# Patient Record
Sex: FEMALE | Race: WHITE | Employment: OTHER | URBAN - METROPOLITAN AREA
[De-identification: names, ages, dates, MRNs, and addresses within clinical notes are randomized per-mention and may not be internally consistent; named-entity substitution may affect disease eponyms.]

---

## 2017-01-03 ENCOUNTER — TELEPHONE (OUTPATIENT)
Dept: INTERNAL MEDICINE CLINIC | Age: 82
End: 2017-01-03

## 2017-01-03 NOTE — TELEPHONE ENCOUNTER
Contacted patient and informed her blood counts have dropped since previous labs and repeat labs will be done with next office visit per Adam Celeste NP. Patient expressed understanding.

## 2017-01-05 ENCOUNTER — TELEPHONE (OUTPATIENT)
Dept: INTERNAL MEDICINE CLINIC | Age: 82
End: 2017-01-05

## 2017-01-05 NOTE — TELEPHONE ENCOUNTER
I have attempted to contact this patient by phone with the following results: left message to return my call on answering machine.

## 2017-01-05 NOTE — TELEPHONE ENCOUNTER
----- Message from Ial Keyes NP sent at 1/5/2017 12:04 PM EST -----  Please notify patient that lab results were reviewed and the results are normal. She has plenty of B12 on board

## 2017-01-10 NOTE — TELEPHONE ENCOUNTER
Contacted Esperanza Locke and informed Her of lab results per Luis Alfredo Pringle NP's request. Maia Davis expressed understanding.  She stated she is continuing with her visit to see Dr Delta Maldonado

## 2017-02-06 ENCOUNTER — HOSPITAL ENCOUNTER (OUTPATIENT)
Dept: LAB | Age: 82
Discharge: HOME | End: 2017-02-06
Payer: MEDICARE

## 2017-02-06 DIAGNOSIS — R19.5 ABNORMAL FECES: ICD-10-CM

## 2017-02-06 LAB
BASOPHILS # BLD AUTO: 0.1 K/UL (ref 0–0.1)
BASOPHILS # BLD: 1 % (ref 0–2)
DIFFERENTIAL METHOD BLD: ABNORMAL
EOSINOPHIL # BLD: 0.3 K/UL (ref 0–0.4)
EOSINOPHIL NFR BLD: 5 % (ref 0–5)
ERYTHROCYTE [DISTWIDTH] IN BLOOD BY AUTOMATED COUNT: 13.9 % (ref 11.6–14.5)
FERRITIN SERPL-MCNC: 30 NG/ML (ref 8–388)
HCT VFR BLD AUTO: 33.2 % (ref 35–45)
HGB BLD-MCNC: 10.4 G/DL (ref 12–16)
IRON SATN MFR SERPL: 18 %
IRON SERPL-MCNC: 61 UG/DL (ref 50–175)
LYMPHOCYTES # BLD AUTO: 35 % (ref 21–52)
LYMPHOCYTES # BLD: 2.3 K/UL (ref 0.9–3.6)
MCH RBC QN AUTO: 30.1 PG (ref 24–34)
MCHC RBC AUTO-ENTMCNC: 31.3 G/DL (ref 31–37)
MCV RBC AUTO: 96.2 FL (ref 74–97)
MONOCYTES # BLD: 0.6 K/UL (ref 0.05–1.2)
MONOCYTES NFR BLD AUTO: 9 % (ref 3–10)
NEUTS SEG # BLD: 3.4 K/UL (ref 1.8–8)
NEUTS SEG NFR BLD AUTO: 50 % (ref 40–73)
PLATELET # BLD AUTO: 176 K/UL (ref 135–420)
PMV BLD AUTO: 11.6 FL (ref 9.2–11.8)
RBC # BLD AUTO: 3.45 M/UL (ref 4.2–5.3)
TIBC SERPL-MCNC: 348 UG/DL (ref 250–450)
WBC # BLD AUTO: 6.6 K/UL (ref 4.6–13.2)

## 2017-02-06 PROCEDURE — 85025 COMPLETE CBC W/AUTO DIFF WBC: CPT | Performed by: INTERNAL MEDICINE

## 2017-02-06 PROCEDURE — 36415 COLL VENOUS BLD VENIPUNCTURE: CPT | Performed by: INTERNAL MEDICINE

## 2017-02-06 PROCEDURE — 82728 ASSAY OF FERRITIN: CPT | Performed by: INTERNAL MEDICINE

## 2017-02-06 PROCEDURE — 83540 ASSAY OF IRON: CPT | Performed by: INTERNAL MEDICINE

## 2017-02-07 ENCOUNTER — HOSPITAL ENCOUNTER (OUTPATIENT)
Dept: LAB | Age: 82
Discharge: HOME | End: 2017-02-07
Payer: MEDICARE

## 2017-02-07 DIAGNOSIS — R19.5 ABNORMAL FECES: ICD-10-CM

## 2017-02-07 LAB — HEMOCCULT STL QL: NEGATIVE

## 2017-02-07 PROCEDURE — 82272 OCCULT BLD FECES 1-3 TESTS: CPT | Performed by: INTERNAL MEDICINE

## 2017-02-07 RX ORDER — LOSARTAN POTASSIUM 50 MG/1
TABLET ORAL
Qty: 180 TAB | Refills: 3 | Status: SHIPPED | OUTPATIENT
Start: 2017-02-07 | End: 2018-03-28 | Stop reason: SDUPTHER

## 2017-03-01 DIAGNOSIS — N32.81 OVERACTIVE BLADDER: ICD-10-CM

## 2017-03-01 RX ORDER — TOLTERODINE 4 MG/1
CAPSULE, EXTENDED RELEASE ORAL
Qty: 30 CAP | Refills: 1 | Status: SHIPPED | OUTPATIENT
Start: 2017-03-01 | End: 2017-03-29 | Stop reason: SDUPTHER

## 2017-03-01 NOTE — TELEPHONE ENCOUNTER
Requested Prescriptions     Pending Prescriptions Disp Refills    tolterodine ER (DETROL LA) 4 mg ER capsule 30 Cap 1     Si tablet daily

## 2017-03-29 DIAGNOSIS — N32.81 OVERACTIVE BLADDER: ICD-10-CM

## 2017-03-29 RX ORDER — TOLTERODINE 4 MG/1
CAPSULE, EXTENDED RELEASE ORAL
Qty: 30 CAP | Refills: 1 | Status: SHIPPED | OUTPATIENT
Start: 2017-03-29 | End: 2017-11-14 | Stop reason: SDUPTHER

## 2017-04-05 RX ORDER — METOPROLOL SUCCINATE 50 MG/1
TABLET, EXTENDED RELEASE ORAL
Qty: 90 TAB | Refills: 2 | Status: SHIPPED | OUTPATIENT
Start: 2017-04-05 | End: 2018-01-15 | Stop reason: SDUPTHER

## 2017-05-11 RX ORDER — ATORVASTATIN CALCIUM 40 MG/1
TABLET, FILM COATED ORAL
Qty: 90 TAB | Refills: 2 | Status: SHIPPED | OUTPATIENT
Start: 2017-05-11 | End: 2018-02-28 | Stop reason: SDUPTHER

## 2017-05-15 ENCOUNTER — OFFICE VISIT (OUTPATIENT)
Dept: CARDIOLOGY CLINIC | Age: 82
End: 2017-05-15

## 2017-05-15 VITALS
OXYGEN SATURATION: 94 % | HEART RATE: 55 BPM | HEIGHT: 60 IN | SYSTOLIC BLOOD PRESSURE: 170 MMHG | DIASTOLIC BLOOD PRESSURE: 82 MMHG | WEIGHT: 119 LBS | BODY MASS INDEX: 23.36 KG/M2

## 2017-05-15 DIAGNOSIS — I10 ESSENTIAL HYPERTENSION, BENIGN: ICD-10-CM

## 2017-05-15 DIAGNOSIS — R09.89 LEFT CAROTID BRUIT: ICD-10-CM

## 2017-05-15 DIAGNOSIS — R42 DIZZINESS: ICD-10-CM

## 2017-05-15 DIAGNOSIS — I25.10 ATHEROSCLEROSIS OF NATIVE CORONARY ARTERY OF NATIVE HEART WITHOUT ANGINA PECTORIS: Primary | ICD-10-CM

## 2017-05-15 DIAGNOSIS — H34.9 RETINAL EMBOLI, RIGHT: ICD-10-CM

## 2017-05-15 DIAGNOSIS — R53.83 FATIGUE, UNSPECIFIED TYPE: ICD-10-CM

## 2017-05-15 NOTE — MR AVS SNAPSHOT
Visit Information Date & Time Provider Department Dept. Phone Encounter #  
 5/15/2017  2:00 PM Sergio Ortiz MD [facility] [id number] Upcoming Health Maintenance Date Due DTaP/Tdap/Td series (1 - Tdap) 12/22/1944 ZOSTER VACCINE AGE 60> 12/22/1983 GLAUCOMA SCREENING Q2Y 12/22/1988 OSTEOPOROSIS SCREENING (DEXA) 12/22/1988 MEDICARE YEARLY EXAM 3/19/2017 INFLUENZA AGE 9 TO ADULT 8/1/2017 Allergies as of 5/15/2017  Review Complete On: 5/15/2017 By: Sergio Ortiz MD  
  
 Severity Noted Reaction Type Reactions Viibryd [Vilazodone] High   Anaphylaxis, Vertigo Coreg [Carvedilol]  10/12/2011    Other (comments) Pt states coreg makes her feel like a \"zombie\" Erythromycin  02/17/2011    Nausea Only Severe. Penicillins  02/17/2011    Hives Current Immunizations  Reviewed on 10/18/2016 Name Date Influenza High Dose Vaccine PF 10/18/2016 Influenza Vaccine (Quad) PF 10/22/2015 Pneumococcal Polysaccharide (PPSV-23) 10/5/2010 Pneumococcal Vaccine (Unspecified Type) 10/17/1996 Not reviewed this visit You Were Diagnosed With   
  
 Codes Comments Atherosclerosis of native coronary artery of native heart without angina pectoris    -  Primary ICD-10-CM: I25.10 ICD-9-CM: 414.01 Essential hypertension, benign     ICD-10-CM: I10 
ICD-9-CM: 401.1 Dizziness     ICD-10-CM: V91 ICD-9-CM: 780.4 Left carotid bruit     ICD-10-CM: R09.89 ICD-9-CM: 041. 9 Retinal emboli, right     ICD-10-CM: H34.9 ICD-9-CM: 362.30 Vitals BP Pulse Height(growth percentile) Weight(growth percentile) SpO2 BMI  
 170/82 (!) 55 5' (1.524 m) 119 lb (54 kg) 94% 23.24 kg/m2 OB Status Smoking Status Postmenopausal Never Smoker Vitals History BMI and BSA Data Body Mass Index Body Surface Area  
 23.24 kg/m 2 1.51 m 2 Preferred Pharmacy Pharmacy Name Phone [facility] [address], [address] [address] [phone] Your Updated Medication List  
  
   
This list is accurate as of: 5/15/17  3:08 PM.  Always use your most recent med list.  
  
  
  
  
 aspirin 81 mg tablet Take 81 mg by mouth daily. atorvastatin 40 mg tablet Commonly known as:  LIPITOR  
TAKE ONE TABLET BY MOUTH DAILY  
  
 bromfenac 0.09 % ophthalmic solution Commonly known as:  BROMDAY  
  
 calcium carbonate-vitamin d2 1,200-400 mg-unit Cap Take 1 Cap by mouth daily. clopidogrel 75 mg Tab Commonly known as:  PLAVIX TAKE ONE TABLET BY MOUTH DAILY  
  
 COQ10  100-100 mg-unit Cap Generic drug:  coenzyme q10-vitamin e Take  by mouth.  
  
 escitalopram oxalate 5 mg tablet Commonly known as:  Laci Maria Eugenia TAKE ONE-HALF TO ONE TABLET BY MOUTH DAILY losartan 50 mg tablet Commonly known as:  COZAAR  
TAKE ONE TABLET BY MOUTH TWICE A DAY  
  
 meclizine 12.5 mg tablet Commonly known as:  ANTIVERT Take 1 Tab by mouth two (2) times daily as needed. metoprolol succinate 50 mg XL tablet Commonly known as:  TOPROL-XL  
TAKE ONE TABLET BY MOUTH DAILY  
  
 omeprazole 40 mg capsule Commonly known as:  PRILOSEC  
1 capsule each AM  
  
 * tolterodine ER 4 mg ER capsule Commonly known as:  DETROL LA  
TAKE ONE CAPSULE BY MOUTH DAILY * tolterodine ER 4 mg ER capsule Commonly known as:  DETROL LA  
1 tablet daily VITAMIN B-12 1,000 mcg tablet Generic drug:  cyanocobalamin Take 1,000 mcg by mouth daily. VITAMIN D3 1,000 unit tablet Generic drug:  cholecalciferol Take 1,000 Units by mouth daily. * Notice: This list has 2 medication(s) that are the same as other medications prescribed for you. Read the directions carefully, and ask your doctor or other care provider to review them with you. We Performed the Following AMB POC EKG ROUTINE W/ 12 LEADS, INTER & REP [76133 CPT(R)] Introducing [facility] [place] [facility]! Jamilah Meyer introduces [employer] patient portal. Now you can access parts of your medical record, email your doctor's office, and request medication refills online. 1. In your internet browser, go to [email or web address] 2. Click on the First Time User? Click Here link in the Sign In box. You will see the New Member Sign Up page. 3. Enter your [employer] Access Code exactly as it appears below. You will not need to use this code after youve completed the sign-up process. If you do not sign up before the expiration date, you must request a new code. · [employer] Access Code: [email or web address] Expires: 8/13/2017  3:08 PM 
 
4. Enter the last four digits of your Social Security Number (xxxx) and Date of Birth (mm/dd/yyyy) as indicated and click Submit. You will be taken to the next sign-up page. 5. Create a [employer] ID. This will be your [employer] login ID and cannot be changed, so think of one that is secure and easy to remember. 6. Create a [employer] password. You can change your password at any time. 7. Enter your Password Reset Question and Answer. This can be used at a later time if you forget your password. 8. Enter your e-mail address. You will receive e-mail notification when new information is available in [address] 9. Click Sign Up. You can now view and download portions of your medical record. 10. Click the Download Summary menu link to download a portable copy of your medical information. If you have questions, please visit the Frequently Asked Questions section of the [employer] website. Remember, [employer] is NOT to be used for urgent needs. For medical emergencies, dial 911. Now available from your iPhone and Android! Please provide this summary of care documentation to your next provider. Your primary care clinician is listed as Gissell Alexander. If you have any questions after today's visit, please call [phone].

## 2017-05-15 NOTE — PROGRESS NOTES
1. Have you been to the ER, urgent care clinic since your last visit? Hospitalized since your last visit? no  2. Have you seen or consulted any other health care providers outside of the [facility] since your last visit? Include any pap smears or colon screening.   no

## 2017-05-15 NOTE — PROGRESS NOTES
HISTORY OF PRESENT ILLNESS  Darrall Sandhoff is a 80 y.o. female. HPI  She is complaining of fatigue and weakness. She states that she feels as if she has no energy. She denies chest pain, dyspnea, orthopnea or PND. She has had no palpitations, dizziness or syncope. She does have issues with poor balance. She denies any symptoms to indicate TIA or amaurosis fugax. She has moderately severe anemia with H & H at 10.4 and 33.2 on 02/06/2017. She has a history of mitral valve prolapse and a single isolated episode of loss of central vision of her right eye which was thought to be due to peripheral embolization from the mitral valve prolapse. She was placed on Inderal LA, which was not tolerated well. This was for migraines. Unexpectedly she had acute anterior ST-elevation myocardial infarction on February 22, 2010, and underwent primary intubation. Her cardiac catheterization demonstrated:    1. A 30% to 40% long stenosis of the dominant RCA in the mid segment. 2. Patent left main trunk. 3. A 20% to 30% stenosis of the circumflex coronary artery diffusely. 4. Total occlusion of the LAD in the proximal segment, with faint left-to-left collaterals. 5. Moderate anteroapical hypokinesis of the left ventricle, with EF in the 45% range. The LAD was subsequently intervened upon and stented with a 2.75-mm Cypher stent successfully. She had repeat echocardiogram on May 11, 2010 which demonstrated improved LV function with EF now up to 60%.    She had ambulatory blood pressure recording on 10/25/2011, which demonstrated mild hypertension with mean ambulatory blood pressure recordings of 148/64 on the current medical regimen. Review of Systems   Constitutional: Positive for malaise/fatigue. Negative for weight loss. HENT: Negative for hearing loss. Eyes: Negative for blurred vision and double vision. Respiratory: Negative for shortness of breath.     Cardiovascular: Negative for chest pain, palpitations, orthopnea, claudication, leg swelling and PND. Gastrointestinal: Negative for blood in stool, heartburn and melena. Genitourinary: Negative for dysuria, frequency, hematuria and urgency. Musculoskeletal: Positive for falls. Negative for back pain and joint pain. Skin: Negative for itching and rash. Neurological: Negative for dizziness, loss of consciousness, weakness and headaches. Psychiatric/Behavioral: Negative for depression and memory loss. Physical Exam   Constitutional: She is oriented to person, place, and time. She appears well-developed and well-nourished. HENT:   Head: Normocephalic and atraumatic. Eyes: Conjunctivae are normal. Pupils are equal, round, and reactive to light. Neck: Normal range of motion. Neck supple. No JVD present. Cardiovascular: Normal rate, regular rhythm, S1 normal and S2 normal.   No extrasystoles are present. PMI is not displaced. Exam reveals no gallop and no friction rub. No murmur heard. Pulses:       Carotid pulses are 3+ on the right side with bruit, and 3+ on the left side with bruit. Pulmonary/Chest: Effort normal. She has no rales. Abdominal: Soft. There is no tenderness. Musculoskeletal: She exhibits no edema. Neurological: She is alert and oriented to person, place, and time. No cranial nerve deficit. Skin: Skin is warm and dry. Psychiatric: She has a normal mood and affect. Her behavior is normal.     Visit Vitals    /82    Pulse (!) 55    Ht 5' (1.524 m)    Wt 54 kg (119 lb)    SpO2 94%    BMI 23.24 kg/m2       Past Medical History:   Diagnosis Date    Cardiac ambulatory blood pressure monitoring 10/25/2011    JNC-7 classification:  Stage 1 hypertension.  Cardiac cath 02/22/2010    mRCA 35%. LM patent. Cx 25%. pLAD 100% (2.75 x 23 mm Cypher stent) LVEDP  20.  EF 45%. Anteroapical mod HK    Cardiac echocardiogram 05/11/2010    EF 60%. Gr 2 DDfx. LAE. Mild MR.  Mild-mod PI; PASP 30 mmHg.     Carotid duplex 08/26/2013    Mild 1-49% bilateral ICA stenosis. Biphasic signals in both subclavian arteries.  Coronary artery disease     acute anterior ST-elevation myocardial infarction/primary stenting of the LAD on 02/22/10/EF 45%.  Coronary atherosclerosis of unspecified type of bypass graft     Depressive disorder     Fatigue     Headache     Heart disease, unspecified     Iron deficiency anemia, unspecified     Migraine     Migraine headache     Myocardial infarction ([address] 02/22/2010    Acute anterior wall w/primary intubation, stenting of prox LAD w/ 2.75-mm Cypher stent. 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  Senile cataract, unspecified     Valvular heart disease     Mitral valve prolapse. Social History     Social History    Marital status:      Spouse name: N/A    Number of children: N/A    Years of education: N/A     Occupational History    Not on file. Social History Main Topics    Smoking status: Never Smoker    Smokeless tobacco: Never Used    Alcohol use No    Drug use: No    Sexual activity: Not on file     Other Topics Concern    Not on file     Social History Narrative       Family History   Problem Relation Age of Onset    Heart Disease Mother     Heart Disease Father        Past Surgical History:   Procedure Laterality Date    HX APPENDECTOMY      HX BREAST LUMPECTOMY      Left.  HX CATARACT REMOVAL  5/1/2013, 5/8/2013    left and right    HX CORONARY STENT PLACEMENT  2/22/10    prox LAD w/ 2.75-mm Cypher stent.     HX HEART CATHETERIZATION      HX TONSILLECTOMY         Current Outpatient Prescriptions   Medication Sig Dispense Refill    atorvastatin (LIPITOR) 40 mg tablet TAKE ONE TABLET BY MOUTH DAILY 90 Tab 2    metoprolol succinate (TOPROL-XL) 50 mg XL tablet TAKE ONE TABLET BY MOUTH DAILY 90 Tab 2    losartan (COZAAR) 50 mg tablet TAKE ONE TABLET BY MOUTH TWICE A  Tab 3    tolterodine ER (DETROL LA) 4 mg ER capsule TAKE ONE CAPSULE BY MOUTH DAILY 30 Cap 0    escitalopram oxalate (LEXAPRO) 5 mg tablet TAKE ONE-HALF TO ONE TABLET BY MOUTH DAILY 30 Tab 4    clopidogrel (PLAVIX) 75 mg tablet TAKE ONE TABLET BY MOUTH DAILY 90 Tab 2    bromfenac (BROMDAY) 0.09 % ophthalmic solution       meclizine (ANTIVERT) 12.5 mg tablet Take 1 Tab by mouth two (2) times daily as needed. 15 Tab 0    omeprazole (PRILOSEC) 40 mg capsule 1 capsule each AM 30 Cap 2    coenzyme q10-vitamin e (COQ10 ) 100-100 mg-unit cap Take  by mouth.  cyanocobalamin (VITAMIN B-12) 1,000 mcg tablet Take 1,000 mcg by mouth daily.  cholecalciferol, vitamin d3, (VITAMIN D) 1,000 unit tablet Take 1,000 Units by mouth daily.  calcium carbonate-vitamin d2 1,200-400 mg-unit Cap Take 1 Cap by mouth daily.  aspirin 81 mg tablet Take 81 mg by mouth daily.  tolterodine ER (DETROL LA) 4 mg ER capsule 1 tablet daily 30 Cap 1       EKG: unchanged from previous tracings, nonspecific ST and T waves changes, sinus bradycardia. ASSESSMENT and PLAN  Encounter Diagnoses   Name Primary?  CAD/acute anterior ST-elevation myocardial infarction/primary stenting of the LAD on 02/22/10/EF 45%. Yes    Essential hypertension, benign     Dizziness     Left carotid bruit     Retinal emboli, right     Fatigue, unspecified type    She has been doing well from a cardiac standpoint. She shows no signs of cardiac decompensation. Her fatigue cannot be explained by her cardiac status and it could be possible that her fatigue is related to anemia. She was advised to discuss with Dr. Jerrell Saint for further evaluation of her anemia. It could be an age factor as well.

## 2017-06-23 ENCOUNTER — HOSPITAL ENCOUNTER (OUTPATIENT)
Dept: LAB | Age: 82
Discharge: HOME | End: 2017-06-23
Payer: MEDICARE

## 2017-06-23 ENCOUNTER — LAB ONLY (OUTPATIENT)
Dept: INTERNAL MEDICINE CLINIC | Age: 82
End: 2017-06-23

## 2017-06-23 ENCOUNTER — CLINICAL SUPPORT (OUTPATIENT)
Dept: INTERNAL MEDICINE CLINIC | Age: 82
End: 2017-06-23

## 2017-06-23 VITALS — SYSTOLIC BLOOD PRESSURE: 128 MMHG | DIASTOLIC BLOOD PRESSURE: 58 MMHG

## 2017-06-23 DIAGNOSIS — D50.9 IRON DEFICIENCY ANEMIA, UNSPECIFIED: Primary | ICD-10-CM

## 2017-06-23 DIAGNOSIS — I10 ESSENTIAL HYPERTENSION, BENIGN: Primary | ICD-10-CM

## 2017-06-23 DIAGNOSIS — I10 ESSENTIAL HYPERTENSION, BENIGN: ICD-10-CM

## 2017-06-23 DIAGNOSIS — D50.9 IRON DEFICIENCY ANEMIA, UNSPECIFIED: ICD-10-CM

## 2017-06-23 LAB
BASOPHILS # BLD AUTO: 0 K/UL (ref 0–0.06)
BASOPHILS # BLD: 0 % (ref 0–2)
DIFFERENTIAL METHOD BLD: ABNORMAL
EOSINOPHIL # BLD: 0.3 K/UL (ref 0–0.4)
EOSINOPHIL NFR BLD: 4 % (ref 0–5)
ERYTHROCYTE [DISTWIDTH] IN BLOOD BY AUTOMATED COUNT: 14.8 % (ref 11.6–14.5)
HCT VFR BLD AUTO: 36.5 % (ref 35–45)
HGB BLD-MCNC: 11.7 G/DL (ref 12–16)
LYMPHOCYTES # BLD AUTO: 25 % (ref 21–52)
LYMPHOCYTES # BLD: 1.8 K/UL (ref 0.9–3.6)
MCH RBC QN AUTO: 30.3 PG (ref 24–34)
MCHC RBC AUTO-ENTMCNC: 32.1 G/DL (ref 31–37)
MCV RBC AUTO: 94.6 FL (ref 74–97)
MONOCYTES # BLD: 0.7 K/UL (ref 0.05–1.2)
MONOCYTES NFR BLD AUTO: 9 % (ref 3–10)
NEUTS SEG # BLD: 4.5 K/UL (ref 1.8–8)
NEUTS SEG NFR BLD AUTO: 62 % (ref 40–73)
PLATELET # BLD AUTO: 176 K/UL (ref 135–420)
PMV BLD AUTO: 11.2 FL (ref 9.2–11.8)
RBC # BLD AUTO: 3.86 M/UL (ref 4.2–5.3)
WBC # BLD AUTO: 7.3 K/UL (ref 4.6–13.2)

## 2017-06-23 PROCEDURE — 85025 COMPLETE CBC W/AUTO DIFF WBC: CPT | Performed by: INTERNAL MEDICINE

## 2017-06-26 ENCOUNTER — TELEPHONE (OUTPATIENT)
Dept: INTERNAL MEDICINE CLINIC | Age: 82
End: 2017-06-26

## 2017-06-27 ENCOUNTER — OFFICE VISIT (OUTPATIENT)
Dept: INTERNAL MEDICINE CLINIC | Age: 82
End: 2017-06-27

## 2017-06-27 VITALS
DIASTOLIC BLOOD PRESSURE: 66 MMHG | TEMPERATURE: 98.2 F | HEART RATE: 54 BPM | RESPIRATION RATE: 16 BRPM | HEIGHT: 60 IN | WEIGHT: 119 LBS | OXYGEN SATURATION: 99 % | BODY MASS INDEX: 23.36 KG/M2 | SYSTOLIC BLOOD PRESSURE: 142 MMHG

## 2017-06-27 DIAGNOSIS — R53.83 FATIGUE, UNSPECIFIED TYPE: Primary | ICD-10-CM

## 2017-06-27 DIAGNOSIS — R79.0 LOW FERRITIN LEVEL: ICD-10-CM

## 2017-06-27 NOTE — PROGRESS NOTES
Carlos Harris is a 80 y.o. female presenting today for Fatigue  . HPI:  Carlos Harris presents to the office today for complaints of having very little energy. Patient noted she gets up out of the bed around 830 a.m and goes to bed around 1130 p.m. She notes she take briefs naps through out the day. Patient was seen by Cardiology recently and was evaluated for her \"lack of energy\". Patient Ferritin level I was 30 ng/ml in February. She noted she started taking iron tabs x 4 weeks ago. Review of Systems   Constitutional: Positive for malaise/fatigue. Negative for chills and fever. Respiratory: Negative for cough. Cardiovascular: Negative for chest pain and palpitations. Allergies   Allergen Reactions    Viibryd [Vilazodone] Anaphylaxis and Vertigo    Coreg [Carvedilol] Other (comments)     Pt states coreg makes her feel like a \"zombie\"    Erythromycin Nausea Only     Severe.  Penicillins Hives       Current Outpatient Prescriptions   Medication Sig Dispense Refill    atorvastatin (LIPITOR) 40 mg tablet TAKE ONE TABLET BY MOUTH DAILY 90 Tab 2    metoprolol succinate (TOPROL-XL) 50 mg XL tablet TAKE ONE TABLET BY MOUTH DAILY 90 Tab 2    tolterodine ER (DETROL LA) 4 mg ER capsule 1 tablet daily 30 Cap 1    losartan (COZAAR) 50 mg tablet TAKE ONE TABLET BY MOUTH TWICE A  Tab 3    escitalopram oxalate (LEXAPRO) 5 mg tablet TAKE ONE-HALF TO ONE TABLET BY MOUTH DAILY 30 Tab 4    clopidogrel (PLAVIX) 75 mg tablet TAKE ONE TABLET BY MOUTH DAILY 90 Tab 2    meclizine (ANTIVERT) 12.5 mg tablet Take 1 Tab by mouth two (2) times daily as needed. 15 Tab 0    omeprazole (PRILOSEC) 40 mg capsule 1 capsule each AM 30 Cap 2    coenzyme q10-vitamin e (COQ10 ) 100-100 mg-unit cap Take  by mouth.  cyanocobalamin (VITAMIN B-12) 1,000 mcg tablet Take 1,000 mcg by mouth daily.  cholecalciferol, vitamin d3, (VITAMIN D) 1,000 unit tablet Take 1,000 Units by mouth daily.  calcium carbonate-vitamin d2 1,200-400 mg-unit Cap Take 1 Cap by mouth daily.  aspirin 81 mg tablet Take 81 mg by mouth daily.  tolterodine ER (DETROL LA) 4 mg ER capsule TAKE ONE CAPSULE BY MOUTH DAILY 30 Cap 0    bromfenac (BROMDAY) 0.09 % ophthalmic solution          Past Medical History:   Diagnosis Date    Cardiac ambulatory blood pressure monitoring 10/25/2011    JNC-7 classification:  Stage 1 hypertension.  Cardiac cath 02/22/2010    mRCA 35%. LM patent. Cx 25%. pLAD 100% (2.75 x 23 mm Cypher stent) LVEDP  20.  EF 45%. Anteroapical mod HK    Cardiac echocardiogram 05/11/2010    EF 60%. Gr 2 DDfx. LAE. Mild MR.  Mild-mod PI; PASP 30 mmHg.  Carotid duplex 08/26/2013    Mild 1-49% bilateral ICA stenosis. Biphasic signals in both subclavian arteries.  Coronary artery disease     acute anterior ST-elevation myocardial infarction/primary stenting of the LAD on 02/22/10/EF 45%.  Coronary atherosclerosis of unspecified type of bypass graft     Depressive disorder     Fatigue     Headache     Heart disease, unspecified     Iron deficiency anemia, unspecified     Migraine     Migraine headache     Myocardial infarction [address] [address] 02/22/2010    Acute anterior wall w/primary intubation, stenting of prox LAD w/ 2.75-mm Cypher stent. 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  Senile cataract, unspecified     Valvular heart disease     Mitral valve prolapse. Past Surgical History:   Procedure Laterality Date    HX APPENDECTOMY      HX BREAST LUMPECTOMY      Left.  HX CATARACT REMOVAL  5/1/2013, 5/8/2013    left and right    HX CORONARY STENT PLACEMENT  2/22/10    prox LAD w/ 2.75-mm Cypher stent.     HX HEART CATHETERIZATION      HX TONSILLECTOMY         Social History     Social History    Marital status:      Spouse name: N/A    Number of children: N/A    Years of education: N/A     Occupational History    Not on file. Social History Main Topics    Smoking status: Never Smoker    Smokeless tobacco: Never Used    Alcohol use No    Drug use: No    Sexual activity: Not on file     Other Topics Concern    Not on file     Social History Narrative       Patient does not have an advanced directive on file    Vitals:    06/27/17 1621   BP: 142/66   Pulse: (!) 54   Resp: 16   Temp: 98.2 °F (36.8 °C)   TempSrc: Tympanic   SpO2: 99%   Weight: 119 lb (54 kg)   Height: 5' (1.524 m)   PainSc:   0 - No pain       Physical Exam   Constitutional: No distress. Cardiovascular: Normal rate and normal heart sounds. No murmur heard. Pulmonary/Chest: Effort normal and breath sounds normal.   Neurological: She is alert. Gait normal.   Psychiatric: Affect normal.   Nursing note and vitals reviewed. Hospital Outpatient Visit on 06/23/2017   Component Date Value Ref Range Status    WBC 06/23/2017 7.3  4.6 - 13.2 K/uL Final    RBC 06/23/2017 3.86* 4.20 - 5.30 M/uL Final    HGB 06/23/2017 11.7* 12.0 - 16.0 g/dL Final    HCT 06/23/2017 36.5  35.0 - 45.0 % Final    MCV 06/23/2017 94.6  74.0 - 97.0 FL Final    MCH 06/23/2017 30.3  24.0 - 34.0 PG Final    MCHC 06/23/2017 32.1  31.0 - 37.0 g/dL Final    RDW 06/23/2017 14.8* 11.6 - 14.5 % Final    PLATELET 44/44/8632 402  135 - 420 K/uL Final    MPV 06/23/2017 11.2  9.2 - 11.8 FL Final    NEUTROPHILS 06/23/2017 62  40 - 73 % Final    LYMPHOCYTES 06/23/2017 25  21 - 52 % Final    MONOCYTES 06/23/2017 9  3 - 10 % Final    EOSINOPHILS 06/23/2017 4  0 - 5 % Final    BASOPHILS 06/23/2017 0  0 - 2 % Final    ABS. NEUTROPHILS 06/23/2017 4.5  1.8 - 8.0 K/UL Final    ABS. LYMPHOCYTES 06/23/2017 1.8  0.9 - 3.6 K/UL Final    ABS. MONOCYTES 06/23/2017 0.7  0.05 - 1.2 K/UL Final    ABS. EOSINOPHILS 06/23/2017 0.3  0.0 - 0.4 K/UL Final    ABS.  BASOPHILS 06/23/2017 0.0  0.0 - 0.06 K/UL Final    DF 06/23/2017 AUTOMATED    Final       .No results found for any visits on 06/27/17. Assessment / Plan:      ICD-10-CM ICD-9-CM    1. Fatigue, unspecified type R53.83 780.79    2. Low ferritin level R79.0 790.6        H& H -11.7/36.5  Will repeat the Ferritin level in 2 months  F/u prn    Follow-up Disposition:  Return in about 2 months (around 8/27/2017). I asked the patient if she  had any questions and answered her  questions.   The patient stated that she understands the treatment plan and agrees with the treatment plan

## 2017-07-05 DIAGNOSIS — N32.81 OVERACTIVE BLADDER: ICD-10-CM

## 2017-07-05 RX ORDER — TOLTERODINE 4 MG/1
CAPSULE, EXTENDED RELEASE ORAL
Qty: 30 CAP | Refills: 2 | Status: SHIPPED | OUTPATIENT
Start: 2017-07-05 | End: 2017-11-13 | Stop reason: SDUPTHER

## 2017-07-05 NOTE — TELEPHONE ENCOUNTER
Requested Prescriptions     Pending Prescriptions Disp Refills    tolterodine ER (DETROL LA) 4 mg ER capsule 30 Cap 0

## 2017-08-15 ENCOUNTER — TELEPHONE (OUTPATIENT)
Dept: INTERNAL MEDICINE CLINIC | Age: 82
End: 2017-08-15

## 2017-08-16 ENCOUNTER — HOSPITAL ENCOUNTER (OUTPATIENT)
Dept: LAB | Age: 82
Discharge: HOME | End: 2017-08-16
Payer: MEDICARE

## 2017-08-16 ENCOUNTER — OFFICE VISIT (OUTPATIENT)
Dept: INTERNAL MEDICINE CLINIC | Age: 82
End: 2017-08-16

## 2017-08-16 VITALS
BODY MASS INDEX: 23.16 KG/M2 | HEIGHT: 60 IN | WEIGHT: 118 LBS | RESPIRATION RATE: 16 BRPM | OXYGEN SATURATION: 98 % | SYSTOLIC BLOOD PRESSURE: 132 MMHG | HEART RATE: 86 BPM | TEMPERATURE: 97.3 F | DIASTOLIC BLOOD PRESSURE: 58 MMHG

## 2017-08-16 DIAGNOSIS — D50.9 IRON DEFICIENCY ANEMIA, UNSPECIFIED: ICD-10-CM

## 2017-08-16 DIAGNOSIS — R53.83 FATIGUE, UNSPECIFIED TYPE: ICD-10-CM

## 2017-08-16 DIAGNOSIS — I10 ESSENTIAL HYPERTENSION, BENIGN: ICD-10-CM

## 2017-08-16 DIAGNOSIS — R53.83 FATIGUE, UNSPECIFIED TYPE: Primary | ICD-10-CM

## 2017-08-16 LAB
BASOPHILS # BLD: 0.1 K/UL (ref 0–0.06)
BASOPHILS NFR BLD: 1 % (ref 0–2)
DIFFERENTIAL METHOD BLD: ABNORMAL
EOSINOPHIL # BLD: 0.3 K/UL (ref 0–0.4)
EOSINOPHIL NFR BLD: 4 % (ref 0–5)
ERYTHROCYTE [DISTWIDTH] IN BLOOD BY AUTOMATED COUNT: 14.8 % (ref 11.6–14.5)
FERRITIN SERPL-MCNC: 68 NG/ML (ref 8–388)
HCT VFR BLD AUTO: 36.3 % (ref 35–45)
HGB BLD-MCNC: 11.6 G/DL (ref 12–16)
IRON SATN MFR SERPL: 28 %
IRON SERPL-MCNC: 87 UG/DL (ref 50–175)
LYMPHOCYTES # BLD: 1.9 K/UL (ref 0.9–3.6)
LYMPHOCYTES NFR BLD: 28 % (ref 21–52)
MCH RBC QN AUTO: 30.5 PG (ref 24–34)
MCHC RBC AUTO-ENTMCNC: 32 G/DL (ref 31–37)
MCV RBC AUTO: 95.5 FL (ref 74–97)
MONOCYTES # BLD: 0.7 K/UL (ref 0.05–1.2)
MONOCYTES NFR BLD: 10 % (ref 3–10)
NEUTS SEG # BLD: 4 K/UL (ref 1.8–8)
NEUTS SEG NFR BLD: 57 % (ref 40–73)
PLATELET # BLD AUTO: 158 K/UL (ref 135–420)
PMV BLD AUTO: 11.1 FL (ref 9.2–11.8)
RBC # BLD AUTO: 3.8 M/UL (ref 4.2–5.3)
TIBC SERPL-MCNC: 308 UG/DL (ref 250–450)
WBC # BLD AUTO: 7 K/UL (ref 4.6–13.2)

## 2017-08-16 PROCEDURE — 85025 COMPLETE CBC W/AUTO DIFF WBC: CPT | Performed by: NURSE PRACTITIONER

## 2017-08-16 PROCEDURE — 83540 ASSAY OF IRON: CPT | Performed by: NURSE PRACTITIONER

## 2017-08-16 PROCEDURE — 82728 ASSAY OF FERRITIN: CPT | Performed by: NURSE PRACTITIONER

## 2017-08-16 RX ORDER — LANOLIN ALCOHOL/MO/W.PET/CERES
325 CREAM (GRAM) TOPICAL
Qty: 30 TAB | Refills: 2 | Status: SHIPPED | OUTPATIENT
Start: 2017-08-16

## 2017-08-17 ENCOUNTER — TELEPHONE (OUTPATIENT)
Dept: INTERNAL MEDICINE CLINIC | Age: 82
End: 2017-08-17

## 2017-08-17 NOTE — TELEPHONE ENCOUNTER
----- Message from Miguel Santana NP sent at 8/17/2017  3:41 PM EDT -----  Please notify patient that lab results were reviewed and the results are normal but her Ferritin is low. It has improved some over the last month but remains low.   Take iron daily

## 2017-08-17 NOTE — PROGRESS NOTES
Please notify patient that lab results were reviewed and the results are normal but her Ferritin is low. It has improved some over the last month but remains low.   Take iron daily

## 2017-08-17 NOTE — PROGRESS NOTES
Tangela Harrison is a 80 y.o. female presenting today for Fatigue  . HPI:  Tangela Harrison presents to the office today for fatigue and cold sensation. Patient denies any chest pain or palpitations today. Patient has a history of iron deficiency anemia. Review of Systems   Constitutional: Positive for malaise/fatigue. Negative for chills and fever. Respiratory: Negative for cough. Cardiovascular: Negative for chest pain and palpitations. Allergies   Allergen Reactions    Viibryd [Vilazodone] Anaphylaxis and Vertigo    Coreg [Carvedilol] Other (comments)     Pt states coreg makes her feel like a \"zombie\"    Erythromycin Nausea Only     Severe.  Penicillins Hives       Current Outpatient Prescriptions   Medication Sig Dispense Refill    ferrous sulfate (IRON) 325 mg (65 mg iron) tablet Take 1 Tab by mouth Daily (before breakfast). 30 Tab 2    tolterodine ER (DETROL LA) 4 mg ER capsule TAKE ONE CAPSULE BY MOUTH DAILY 30 Cap 2    atorvastatin (LIPITOR) 40 mg tablet TAKE ONE TABLET BY MOUTH DAILY 90 Tab 2    metoprolol succinate (TOPROL-XL) 50 mg XL tablet TAKE ONE TABLET BY MOUTH DAILY 90 Tab 2    tolterodine ER (DETROL LA) 4 mg ER capsule 1 tablet daily 30 Cap 1    losartan (COZAAR) 50 mg tablet TAKE ONE TABLET BY MOUTH TWICE A  Tab 3    escitalopram oxalate (LEXAPRO) 5 mg tablet TAKE ONE-HALF TO ONE TABLET BY MOUTH DAILY 30 Tab 4    clopidogrel (PLAVIX) 75 mg tablet TAKE ONE TABLET BY MOUTH DAILY 90 Tab 2    bromfenac (BROMDAY) 0.09 % ophthalmic solution       meclizine (ANTIVERT) 12.5 mg tablet Take 1 Tab by mouth two (2) times daily as needed. 15 Tab 0    omeprazole (PRILOSEC) 40 mg capsule 1 capsule each AM 30 Cap 2    coenzyme q10-vitamin e (COQ10 ) 100-100 mg-unit cap Take  by mouth.  cyanocobalamin (VITAMIN B-12) 1,000 mcg tablet Take 1,000 mcg by mouth daily.       cholecalciferol, vitamin d3, (VITAMIN D) 1,000 unit tablet Take 1,000 Units by mouth daily.      calcium carbonate-vitamin d2 1,200-400 mg-unit Cap Take 1 Cap by mouth daily.  aspirin 81 mg tablet Take 81 mg by mouth daily. Past Medical History:   Diagnosis Date    Cardiac ambulatory blood pressure monitoring 10/25/2011    JNC-7 classification:  Stage 1 hypertension.  Cardiac cath 02/22/2010    mRCA 35%. LM patent. Cx 25%. pLAD 100% (2.75 x 23 mm Cypher stent) LVEDP  20.  EF 45%. Anteroapical mod HK    Cardiac echocardiogram 05/11/2010    EF 60%. Gr 2 DDfx. LAE. Mild MR.  Mild-mod PI; PASP 30 mmHg.  Carotid duplex 08/26/2013    Mild 1-49% bilateral ICA stenosis. Biphasic signals in both subclavian arteries.  Coronary artery disease     acute anterior ST-elevation myocardial infarction/primary stenting of the LAD on 02/22/10/EF 45%.  Coronary atherosclerosis of unspecified type of bypass graft     Depressive disorder     Fatigue     Headache     Heart disease, unspecified     Iron deficiency anemia, unspecified     Migraine     Migraine headache     Myocardial infarction ([address] 02/22/2010    Acute anterior wall w/primary intubation, stenting of prox LAD w/ 2.75-mm Cypher stent. 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  Senile cataract, unspecified     Valvular heart disease     Mitral valve prolapse. Past Surgical History:   Procedure Laterality Date    HX APPENDECTOMY      HX BREAST LUMPECTOMY      Left.  HX CATARACT REMOVAL  5/1/2013, 5/8/2013    left and right    HX CORONARY STENT PLACEMENT  2/22/10    prox LAD w/ 2.75-mm Cypher stent.  HX HEART CATHETERIZATION      HX TONSILLECTOMY         Social History     Social History    Marital status:      Spouse name: N/A    Number of children: N/A    Years of education: N/A     Occupational History    Not on file.      Social History Main Topics  Smoking status: Never Smoker    Smokeless tobacco: Never Used    Alcohol use No    Drug use: No    Sexual activity: No     Other Topics Concern    Not on file     Social History Narrative       Patient does not have an advanced directive on file    Vitals:    08/16/17 1152   BP: 132/58   Pulse: 86   Resp: 16   Temp: 97.3 °F (36.3 °C)   TempSrc: Tympanic   SpO2: 98%   Weight: 118 lb (53.5 kg)   Height: 5' (1.524 m)   PainSc:   0 - No pain       Physical Exam   Constitutional: No distress. Cardiovascular: Normal rate, regular rhythm and normal heart sounds. Pulmonary/Chest: Effort normal and breath sounds normal.   Neurological: She is alert. Skin: Skin is warm. Nursing note and vitals reviewed. Hospital Outpatient Visit on 08/16/2017   Component Date Value Ref Range Status    Iron 08/16/2017 87  50 - 175 ug/dL Final    Patients receiving metal-binding drugs (e.g. deferoxamine) may show spuriously depressed iron values, as chelated iron may not properly react in the iron assay.  TIBC 08/16/2017 308  250 - 450 ug/dL Final    Iron % saturation 08/16/2017 28  % Final    Ferritin 08/16/2017 68  8 - 388 NG/ML Final    WBC 08/16/2017 7.0  4.6 - 13.2 K/uL Final    RBC 08/16/2017 3.80* 4.20 - 5.30 M/uL Final    HGB 08/16/2017 11.6* 12.0 - 16.0 g/dL Final    HCT 08/16/2017 36.3  35.0 - 45.0 % Final    MCV 08/16/2017 95.5  74.0 - 97.0 FL Final    MCH 08/16/2017 30.5  24.0 - 34.0 PG Final    MCHC 08/16/2017 32.0  31.0 - 37.0 g/dL Final    RDW 08/16/2017 14.8* 11.6 - 14.5 % Final    PLATELET 95/81/2449 582  135 - 420 K/uL Final    MPV 08/16/2017 11.1  9.2 - 11.8 FL Final    NEUTROPHILS 08/16/2017 57  40 - 73 % Final    LYMPHOCYTES 08/16/2017 28  21 - 52 % Final    MONOCYTES 08/16/2017 10  3 - 10 % Final    EOSINOPHILS 08/16/2017 4  0 - 5 % Final    BASOPHILS 08/16/2017 1  0 - 2 % Final    ABS. NEUTROPHILS 08/16/2017 4.0  1.8 - 8.0 K/UL Final    ABS.  LYMPHOCYTES 08/16/2017 1.9  0.9 - 3.6 K/UL Final    ABS. MONOCYTES 08/16/2017 0.7  0.05 - 1.2 K/UL Final    ABS. EOSINOPHILS 08/16/2017 0.3  0.0 - 0.4 K/UL Final    ABS. BASOPHILS 08/16/2017 0.1* 0.0 - 0.06 K/UL Final    DF 08/16/2017 AUTOMATED    Final   Hospital Outpatient Visit on 06/23/2017   Component Date Value Ref Range Status    WBC 06/23/2017 7.3  4.6 - 13.2 K/uL Final    RBC 06/23/2017 3.86* 4.20 - 5.30 M/uL Final    HGB 06/23/2017 11.7* 12.0 - 16.0 g/dL Final    HCT 06/23/2017 36.5  35.0 - 45.0 % Final    MCV 06/23/2017 94.6  74.0 - 97.0 FL Final    MCH 06/23/2017 30.3  24.0 - 34.0 PG Final    MCHC 06/23/2017 32.1  31.0 - 37.0 g/dL Final    RDW 06/23/2017 14.8* 11.6 - 14.5 % Final    PLATELET 54/92/3092 720  135 - 420 K/uL Final    MPV 06/23/2017 11.2  9.2 - 11.8 FL Final    NEUTROPHILS 06/23/2017 62  40 - 73 % Final    LYMPHOCYTES 06/23/2017 25  21 - 52 % Final    MONOCYTES 06/23/2017 9  3 - 10 % Final    EOSINOPHILS 06/23/2017 4  0 - 5 % Final    BASOPHILS 06/23/2017 0  0 - 2 % Final    ABS. NEUTROPHILS 06/23/2017 4.5  1.8 - 8.0 K/UL Final    ABS. LYMPHOCYTES 06/23/2017 1.8  0.9 - 3.6 K/UL Final    ABS. MONOCYTES 06/23/2017 0.7  0.05 - 1.2 K/UL Final    ABS. EOSINOPHILS 06/23/2017 0.3  0.0 - 0.4 K/UL Final    ABS.  BASOPHILS 06/23/2017 0.0  0.0 - 0.06 K/UL Final    DF 06/23/2017 AUTOMATED    Final       .  Results for orders placed or performed during the hospital encounter of 08/16/17   IRON PROFILE   Result Value Ref Range    Iron 87 50 - 175 ug/dL    TIBC 308 250 - 450 ug/dL    Iron % saturation 28 %   FERRITIN   Result Value Ref Range    Ferritin 68 8 - 388 NG/ML   CBC WITH AUTOMATED DIFF   Result Value Ref Range    WBC 7.0 4.6 - 13.2 K/uL    RBC 3.80 (L) 4.20 - 5.30 M/uL    HGB 11.6 (L) 12.0 - 16.0 g/dL    HCT 36.3 35.0 - 45.0 %    MCV 95.5 74.0 - 97.0 FL    MCH 30.5 24.0 - 34.0 PG    MCHC 32.0 31.0 - 37.0 g/dL    RDW 14.8 (H) 11.6 - 14.5 %    PLATELET 142 661 - 937 K/uL    MPV 11.1 9.2 - 11.8 FL NEUTROPHILS 57 40 - 73 %    LYMPHOCYTES 28 21 - 52 %    MONOCYTES 10 3 - 10 %    EOSINOPHILS 4 0 - 5 %    BASOPHILS 1 0 - 2 %    ABS. NEUTROPHILS 4.0 1.8 - 8.0 K/UL    ABS. LYMPHOCYTES 1.9 0.9 - 3.6 K/UL    ABS. MONOCYTES 0.7 0.05 - 1.2 K/UL    ABS. EOSINOPHILS 0.3 0.0 - 0.4 K/UL    ABS. BASOPHILS 0.1 (H) 0.0 - 0.06 K/UL    DF AUTOMATED         Assessment / Plan:      ICD-10-CM ICD-9-CM    1. Fatigue, unspecified type R53.83 780.79 IRON PROFILE      FERRITIN      ferrous sulfate (IRON) 325 mg (65 mg iron) tablet      CBC WITH AUTOMATED DIFF      DC COLLECTION VENOUS BLOOD,VENIPUNCTURE   2. Iron deficiency anemia, unspecified D50.9 280.9 IRON PROFILE      FERRITIN      ferrous sulfate (IRON) 325 mg (65 mg iron) tablet      CBC WITH AUTOMATED DIFF      DC COLLECTION VENOUS BLOOD,VENIPUNCTURE   3. Essential hypertension, benign I10 401.1      Iron Profile and Ferritin Levl today  CBC today  Iron rx given  HTN- controlled  F/u in 1 month      Follow-up Disposition:  Return in about 1 month (around 9/16/2017), or if symptoms worsen or fail to improve. I asked the patient if she  had any questions and answered her  questions.   The patient stated that she understands the treatment plan and agrees with the treatment plan

## 2017-08-17 NOTE — TELEPHONE ENCOUNTER
Contacted Esperanza Reyes and informed Her of lab results and recommendation to take iron supplements daily per Carolyn Graham NP's request. Daisy Monroy expressed understanding.

## 2017-10-04 RX ORDER — ESCITALOPRAM OXALATE 5 MG/1
TABLET ORAL
Qty: 30 TAB | Refills: 3 | Status: SHIPPED | OUTPATIENT
Start: 2017-10-04 | End: 2018-02-12 | Stop reason: SDUPTHER

## 2017-10-17 ENCOUNTER — CLINICAL SUPPORT (OUTPATIENT)
Dept: INTERNAL MEDICINE CLINIC | Age: 82
End: 2017-10-17

## 2017-10-17 DIAGNOSIS — Z23 ENCOUNTER FOR IMMUNIZATION: ICD-10-CM

## 2017-11-13 DIAGNOSIS — N32.81 OVERACTIVE BLADDER: ICD-10-CM

## 2017-11-13 RX ORDER — TOLTERODINE 4 MG/1
CAPSULE, EXTENDED RELEASE ORAL
Qty: 30 CAP | Refills: 1 | Status: SHIPPED | OUTPATIENT
Start: 2017-11-13 | End: 2017-11-30

## 2017-11-14 DIAGNOSIS — N32.81 OVERACTIVE BLADDER: ICD-10-CM

## 2017-11-14 RX ORDER — TOLTERODINE 4 MG/1
CAPSULE, EXTENDED RELEASE ORAL
Qty: 30 CAP | Refills: 5 | Status: SHIPPED | OUTPATIENT
Start: 2017-11-14 | End: 2018-07-20 | Stop reason: SDUPTHER

## 2017-11-25 RX ORDER — CLOPIDOGREL BISULFATE 75 MG/1
TABLET ORAL
Qty: 90 TAB | Refills: 1 | Status: SHIPPED | OUTPATIENT
Start: 2017-11-25 | End: 2018-06-20 | Stop reason: SDUPTHER

## 2017-11-30 ENCOUNTER — OFFICE VISIT (OUTPATIENT)
Dept: INTERNAL MEDICINE CLINIC | Age: 82
End: 2017-11-30

## 2017-11-30 VITALS
DIASTOLIC BLOOD PRESSURE: 60 MMHG | SYSTOLIC BLOOD PRESSURE: 108 MMHG | OXYGEN SATURATION: 99 % | HEIGHT: 60 IN | RESPIRATION RATE: 16 BRPM | TEMPERATURE: 98.3 F | BODY MASS INDEX: 23.36 KG/M2 | WEIGHT: 119 LBS | HEART RATE: 59 BPM

## 2017-11-30 DIAGNOSIS — I10 ESSENTIAL HYPERTENSION, BENIGN: ICD-10-CM

## 2017-11-30 DIAGNOSIS — D50.9 IRON DEFICIENCY ANEMIA, UNSPECIFIED IRON DEFICIENCY ANEMIA TYPE: ICD-10-CM

## 2017-11-30 DIAGNOSIS — R26.89 BALANCE DISORDER: ICD-10-CM

## 2017-11-30 DIAGNOSIS — N64.4 BREAST PAIN, LEFT: ICD-10-CM

## 2017-11-30 DIAGNOSIS — R53.83 FATIGUE, UNSPECIFIED TYPE: Primary | ICD-10-CM

## 2017-11-30 NOTE — PROGRESS NOTES
Jose Angel Haas is a 80 y.o. female presenting today for Fatigue  . HPI:  Jose Angel Haas presents to the office today for fatigue and left breast pain. Patient noted she has very little energy over the last month. She noted she is continuing to take her iron daily. She is also complaining of intermittent left breast pain x 2 weeks. Review of Systems   Constitutional: Positive for malaise/fatigue. Respiratory: Negative for cough. Cardiovascular: Negative for chest pain and palpitations. Gastrointestinal: Negative for nausea and vomiting. Neurological:        Feels off balance- but denies any dizziness         Allergies   Allergen Reactions    Viibryd [Vilazodone] Anaphylaxis and Vertigo    Coreg [Carvedilol] Other (comments)     Pt states coreg makes her feel like a \"zombie\"    Erythromycin Nausea Only     Severe.  Penicillins Hives       Current Outpatient Prescriptions   Medication Sig Dispense Refill    clopidogrel (PLAVIX) 75 mg tab TAKE ONE TABLET BY MOUTH DAILY 90 Tab 1    tolterodine ER (DETROL LA) 4 mg ER capsule 1 tablet daily 30 Cap 5    escitalopram oxalate (LEXAPRO) 5 mg tablet TAKE 1/2 TO 1 TABLET DAILY 30 Tab 3    ferrous sulfate (IRON) 325 mg (65 mg iron) tablet Take 1 Tab by mouth Daily (before breakfast). 30 Tab 2    atorvastatin (LIPITOR) 40 mg tablet TAKE ONE TABLET BY MOUTH DAILY 90 Tab 2    metoprolol succinate (TOPROL-XL) 50 mg XL tablet TAKE ONE TABLET BY MOUTH DAILY 90 Tab 2    losartan (COZAAR) 50 mg tablet TAKE ONE TABLET BY MOUTH TWICE A  Tab 3    bromfenac (BROMDAY) 0.09 % ophthalmic solution       omeprazole (PRILOSEC) 40 mg capsule 1 capsule each AM 30 Cap 2    coenzyme q10-vitamin e (COQ10 ) 100-100 mg-unit cap Take  by mouth.  cyanocobalamin (VITAMIN B-12) 1,000 mcg tablet Take 1,000 mcg by mouth daily.  cholecalciferol, vitamin d3, (VITAMIN D) 1,000 unit tablet Take 1,000 Units by mouth daily.       calcium carbonate-vitamin d2 1,200-400 mg-unit Cap Take 1 Cap by mouth daily.  aspirin 81 mg tablet Take 81 mg by mouth daily.  meclizine (ANTIVERT) 12.5 mg tablet Take 1 Tab by mouth two (2) times daily as needed. 15 Tab 0       Past Medical History:   Diagnosis Date    Cardiac ambulatory blood pressure monitoring 10/25/2011    JNC-7 classification:  Stage 1 hypertension.  Cardiac cath 02/22/2010    mRCA 35%. LM patent. Cx 25%. pLAD 100% (2.75 x 23 mm Cypher stent) LVEDP  20.  EF 45%. Anteroapical mod HK    Cardiac echocardiogram 05/11/2010    EF 60%. Gr 2 DDfx. LAE. Mild MR.  Mild-mod PI; PASP 30 mmHg.  Carotid duplex 08/26/2013    Mild 1-49% bilateral ICA stenosis. Biphasic signals in both subclavian arteries.  Coronary artery disease     acute anterior ST-elevation myocardial infarction/primary stenting of the LAD on 02/22/10/EF 45%.  Coronary atherosclerosis of unspecified type of bypass graft(414.05)     Depressive disorder     Fatigue     Headache(784.0)     Heart disease, unspecified     Iron deficiency anemia, unspecified     Migraine     Migraine headache     Myocardial infarction 02/22/2010    Acute anterior wall w/primary intubation, stenting of prox LAD w/ 2.75-mm Cypher stent. 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  Senile cataract, unspecified     Valvular heart disease     Mitral valve prolapse. Past Surgical History:   Procedure Laterality Date    HX APPENDECTOMY      HX BREAST LUMPECTOMY      Left.  HX CATARACT REMOVAL  5/1/2013, 5/8/2013    left and right    HX CORONARY STENT PLACEMENT  2/22/10    prox LAD w/ 2.75-mm Cypher stent.     HX HEART CATHETERIZATION      HX TONSILLECTOMY         Social History     Social History    Marital status:      Spouse name: N/A    Number of children: N/A    Years of education: N/A     Occupational History    Not on file. Social History Main Topics    Smoking status: Never Smoker    Smokeless tobacco: Never Used    Alcohol use No    Drug use: No    Sexual activity: No     Other Topics Concern    Not on file     Social History Narrative       Patient does not have an advanced directive on file    Vitals:    11/30/17 1416   BP: 108/60   Pulse: (!) 59   Resp: 16   Temp: 98.3 °F (36.8 °C)   TempSrc: Tympanic   SpO2: 99%   Weight: 119 lb (54 kg)   Height: 5' (1.524 m)   PainSc:   0 - No pain       Physical Exam   Constitutional: No distress. HENT:   Right Ear: External ear normal.   Left Ear: External ear normal.   Cardiovascular: Normal rate and regular rhythm. Pulmonary/Chest: Effort normal and breath sounds normal. Left breast exhibits tenderness. Left breast exhibits no inverted nipple. Lymphadenopathy:     She has no cervical adenopathy. Neurological: She is alert. No cranial nerve deficit. Ambulating with a stick     Nursing note and vitals reviewed. No visits with results within 3 Month(s) from this visit. Latest known visit with results is:    Hospital Outpatient Visit on 08/16/2017   Component Date Value Ref Range Status    Iron 08/16/2017 87  50 - 175 ug/dL Final    Patients receiving metal-binding drugs (e.g. deferoxamine) may show spuriously depressed iron values, as chelated iron may not properly react in the iron assay.     TIBC 08/16/2017 308  250 - 450 ug/dL Final    Iron % saturation 08/16/2017 28  % Final    Ferritin 08/16/2017 68  8 - 388 NG/ML Final    WBC 08/16/2017 7.0  4.6 - 13.2 K/uL Final    RBC 08/16/2017 3.80* 4.20 - 5.30 M/uL Final    HGB 08/16/2017 11.6* 12.0 - 16.0 g/dL Final    HCT 08/16/2017 36.3  35.0 - 45.0 % Final    MCV 08/16/2017 95.5  74.0 - 97.0 FL Final    MCH 08/16/2017 30.5  24.0 - 34.0 PG Final    MCHC 08/16/2017 32.0  31.0 - 37.0 g/dL Final    RDW 08/16/2017 14.8* 11.6 - 14.5 % Final    PLATELET 08/16/2017 158  135 - 420 K/uL Final    MPV 08/16/2017 11.1  9.2 - 11.8 FL Final    NEUTROPHILS 08/16/2017 57  40 - 73 % Final    LYMPHOCYTES 08/16/2017 28  21 - 52 % Final    MONOCYTES 08/16/2017 10  3 - 10 % Final    EOSINOPHILS 08/16/2017 4  0 - 5 % Final    BASOPHILS 08/16/2017 1  0 - 2 % Final    ABS. NEUTROPHILS 08/16/2017 4.0  1.8 - 8.0 K/UL Final    ABS. LYMPHOCYTES 08/16/2017 1.9  0.9 - 3.6 K/UL Final    ABS. MONOCYTES 08/16/2017 0.7  0.05 - 1.2 K/UL Final    ABS. EOSINOPHILS 08/16/2017 0.3  0.0 - 0.4 K/UL Final    ABS. BASOPHILS 08/16/2017 0.1* 0.0 - 0.06 K/UL Final    DF 08/16/2017 AUTOMATED    Final       .No results found for any visits on 11/30/17. Assessment / Plan:      ICD-10-CM ICD-9-CM    1. Fatigue, unspecified type R53.83 780.79    2. Essential hypertension, benign I10 401.1    3. Iron deficiency anemia, unspecified iron deficiency anemia type D50.9 280.9 CBC WITH AUTOMATED DIFF      FERRITIN   4. Breast pain, left N64.4 611.71 MENDY MAMMO LT DX INCL CAD   5. Balance disorder R26.89 781.99 MRI BRAIN W WO CONT     CBC and Ferritin ordered  Dx Mammo  Will do MRI  Suggest patient with a walker to steady her gait  F/u in 1 week    Follow-up Disposition:  Return in about 1 week (around 12/7/2017). I asked the patient if she  had any questions and answered her  questions.   The patient stated that she understands the treatment plan and agrees with the treatment plan

## 2017-12-04 ENCOUNTER — HOSPITAL ENCOUNTER (OUTPATIENT)
Dept: LAB | Age: 82
Discharge: HOME | End: 2017-12-04
Payer: MEDICARE

## 2017-12-04 DIAGNOSIS — D50.9 IRON DEFICIENCY ANEMIA, UNSPECIFIED IRON DEFICIENCY ANEMIA TYPE: ICD-10-CM

## 2017-12-04 LAB
BASOPHILS # BLD: 0.1 K/UL (ref 0–0.1)
BASOPHILS NFR BLD: 1 % (ref 0–2)
DIFFERENTIAL METHOD BLD: ABNORMAL
EOSINOPHIL # BLD: 0.3 K/UL (ref 0–0.4)
EOSINOPHIL NFR BLD: 4 % (ref 0–5)
ERYTHROCYTE [DISTWIDTH] IN BLOOD BY AUTOMATED COUNT: 13.9 % (ref 11.6–14.5)
FERRITIN SERPL-MCNC: 115 NG/ML (ref 8–388)
HCT VFR BLD AUTO: 35.6 % (ref 35–45)
HGB BLD-MCNC: 11.2 G/DL (ref 12–16)
LYMPHOCYTES # BLD: 2.3 K/UL (ref 0.9–3.6)
LYMPHOCYTES NFR BLD: 32 % (ref 21–52)
MCH RBC QN AUTO: 30.3 PG (ref 24–34)
MCHC RBC AUTO-ENTMCNC: 31.5 G/DL (ref 31–37)
MCV RBC AUTO: 96.2 FL (ref 74–97)
MONOCYTES # BLD: 0.9 K/UL (ref 0.05–1.2)
MONOCYTES NFR BLD: 13 % (ref 3–10)
NEUTS SEG # BLD: 3.5 K/UL (ref 1.8–8)
NEUTS SEG NFR BLD: 50 % (ref 40–73)
PLATELET # BLD AUTO: 169 K/UL (ref 135–420)
PMV BLD AUTO: 10.9 FL (ref 9.2–11.8)
RBC # BLD AUTO: 3.7 M/UL (ref 4.2–5.3)
WBC # BLD AUTO: 7 K/UL (ref 4.6–13.2)

## 2017-12-04 PROCEDURE — 85025 COMPLETE CBC W/AUTO DIFF WBC: CPT | Performed by: NURSE PRACTITIONER

## 2017-12-04 PROCEDURE — 36415 COLL VENOUS BLD VENIPUNCTURE: CPT | Performed by: NURSE PRACTITIONER

## 2017-12-04 PROCEDURE — 82728 ASSAY OF FERRITIN: CPT | Performed by: NURSE PRACTITIONER

## 2017-12-08 NOTE — PROGRESS NOTES
Please notify patient that lab results were reviewed and the results are ok. Ferritin level is much better.

## 2017-12-11 ENCOUNTER — HOSPITAL ENCOUNTER (OUTPATIENT)
Age: 82
Discharge: HOME | End: 2017-12-11
Attending: NURSE PRACTITIONER
Payer: MEDICARE

## 2017-12-11 ENCOUNTER — TELEPHONE (OUTPATIENT)
Dept: INTERNAL MEDICINE CLINIC | Age: 82
End: 2017-12-11

## 2017-12-11 ENCOUNTER — HOSPITAL ENCOUNTER (OUTPATIENT)
Dept: ULTRASOUND IMAGING | Age: 82
Discharge: HOME | End: 2017-12-11
Attending: NURSE PRACTITIONER
Payer: MEDICARE

## 2017-12-11 ENCOUNTER — HOSPITAL ENCOUNTER (OUTPATIENT)
Dept: MAMMOGRAPHY | Age: 82
Discharge: HOME | End: 2017-12-11
Attending: NURSE PRACTITIONER
Payer: MEDICARE

## 2017-12-11 DIAGNOSIS — R26.89 BALANCE DISORDER: ICD-10-CM

## 2017-12-11 DIAGNOSIS — N64.4 BREAST PAIN: Primary | ICD-10-CM

## 2017-12-11 DIAGNOSIS — N64.4 BREAST PAIN, LEFT: ICD-10-CM

## 2017-12-11 DIAGNOSIS — N64.4 BREAST PAIN: ICD-10-CM

## 2017-12-11 LAB — CREAT UR-MCNC: 1.1 MG/DL (ref 0.6–1.3)

## 2017-12-11 PROCEDURE — 82565 ASSAY OF CREATININE: CPT

## 2017-12-11 PROCEDURE — 74011250636 HC RX REV CODE- 250/636: Performed by: NURSE PRACTITIONER

## 2017-12-11 PROCEDURE — 77062 BREAST TOMOSYNTHESIS BI: CPT

## 2017-12-11 PROCEDURE — 76642 ULTRASOUND BREAST LIMITED: CPT

## 2017-12-11 PROCEDURE — A9585 GADOBUTROL INJECTION: HCPCS | Performed by: NURSE PRACTITIONER

## 2017-12-11 PROCEDURE — 70553 MRI BRAIN STEM W/O & W/DYE: CPT

## 2017-12-11 RX ADMIN — GADOBUTROL 5 ML: 604.72 INJECTION INTRAVENOUS at 17:00

## 2017-12-11 NOTE — PROGRESS NOTES
Virgil Castanon contacted office and requested Mammo order change and US added per hima gonzalez np verbal order.  Order read back and confirmed

## 2017-12-14 ENCOUNTER — TELEPHONE (OUTPATIENT)
Dept: INTERNAL MEDICINE CLINIC | Age: 82
End: 2017-12-14

## 2017-12-14 NOTE — TELEPHONE ENCOUNTER
I have attempted to contact this patient by phone with the following results: left message to return my call on answering machine.  In reference to mammogram performed on 12/ 13/2017

## 2017-12-14 NOTE — TELEPHONE ENCOUNTER
----- Message from Torsten Hatfield NP sent at 12/12/2017  4:14 PM EST -----  Please let the patient know the Mammogram results were normal.  Thanks!

## 2018-01-02 ENCOUNTER — TELEPHONE (OUTPATIENT)
Dept: INTERNAL MEDICINE CLINIC | Age: 83
End: 2018-01-02

## 2018-01-02 NOTE — TELEPHONE ENCOUNTER
I have attempted to contact this patient by phone with the following results: left message to return my call on answering machine.  In reference to MRI of Brain performed on 12/11/2017

## 2018-01-02 NOTE — TELEPHONE ENCOUNTER
Contacted Esperanza Duenas and informed Her of lab results per Viva Ormond, NP's request. Marguerite Reyes expressed understanding.

## 2018-01-02 NOTE — TELEPHONE ENCOUNTER
Contacted Esperanza Reyes and informed Her of mammogram results per Sammy Argueta NP's request. Jaswinder Villa expressed understanding.

## 2018-01-02 NOTE — TELEPHONE ENCOUNTER
----- Message from Dagmar Feldman NP sent at 12/22/2017  9:09 AM EST -----  Please let patient know the xray results are ok

## 2018-01-15 RX ORDER — METOPROLOL SUCCINATE 50 MG/1
TABLET, EXTENDED RELEASE ORAL
Qty: 90 TAB | Refills: 1 | Status: SHIPPED | OUTPATIENT
Start: 2018-01-15 | End: 2018-07-20 | Stop reason: SDUPTHER

## 2018-02-13 RX ORDER — ESCITALOPRAM OXALATE 5 MG/1
TABLET ORAL
Qty: 30 TAB | Refills: 2 | Status: SHIPPED | OUTPATIENT
Start: 2018-02-13 | End: 2018-05-22 | Stop reason: SDUPTHER

## 2018-02-28 RX ORDER — ATORVASTATIN CALCIUM 40 MG/1
TABLET, FILM COATED ORAL
Qty: 90 TAB | Refills: 1 | Status: SHIPPED | OUTPATIENT
Start: 2018-02-28 | End: 2018-03-02 | Stop reason: SDUPTHER

## 2018-03-02 NOTE — TELEPHONE ENCOUNTER
Requested Prescriptions     Pending Prescriptions Disp Refills    atorvastatin (LIPITOR) 40 mg tablet 90 Tab 1

## 2018-03-06 RX ORDER — ATORVASTATIN CALCIUM 40 MG/1
TABLET, FILM COATED ORAL
Qty: 90 TAB | Refills: 1 | Status: CANCELLED | OUTPATIENT
Start: 2018-03-06

## 2018-03-07 RX ORDER — ATORVASTATIN CALCIUM 40 MG/1
TABLET, FILM COATED ORAL
Qty: 90 TAB | Refills: 1 | Status: SHIPPED | OUTPATIENT
Start: 2018-03-07 | End: 2019-03-01 | Stop reason: SDUPTHER

## 2018-03-29 RX ORDER — LOSARTAN POTASSIUM 50 MG/1
TABLET ORAL
Qty: 180 TAB | Refills: 3 | Status: SHIPPED | OUTPATIENT
Start: 2018-03-29 | End: 2019-04-09 | Stop reason: SDUPTHER

## 2018-05-22 RX ORDER — ESCITALOPRAM OXALATE 5 MG/1
TABLET ORAL
Qty: 30 TAB | Refills: 1 | Status: SHIPPED | OUTPATIENT
Start: 2018-05-22 | End: 2018-07-20 | Stop reason: SDUPTHER

## 2018-06-21 RX ORDER — CLOPIDOGREL BISULFATE 75 MG/1
TABLET ORAL
Qty: 90 TAB | Refills: 1 | Status: SHIPPED | OUTPATIENT
Start: 2018-06-21 | End: 2018-06-29 | Stop reason: ALTCHOICE

## 2018-06-21 RX ORDER — CLOPIDOGREL BISULFATE 75 MG/1
TABLET ORAL
Qty: 90 TAB | Refills: 0 | Status: SHIPPED | OUTPATIENT
Start: 2018-06-21 | End: 2019-07-29

## 2018-06-29 ENCOUNTER — OFFICE VISIT (OUTPATIENT)
Dept: INTERNAL MEDICINE CLINIC | Age: 83
End: 2018-06-29

## 2018-06-29 VITALS
SYSTOLIC BLOOD PRESSURE: 132 MMHG | HEART RATE: 58 BPM | OXYGEN SATURATION: 98 % | RESPIRATION RATE: 16 BRPM | TEMPERATURE: 98.2 F | DIASTOLIC BLOOD PRESSURE: 60 MMHG | HEIGHT: 60 IN

## 2018-06-29 DIAGNOSIS — R26.9 GAIT DISTURBANCE: ICD-10-CM

## 2018-06-29 DIAGNOSIS — I25.10 CORONARY ARTERY DISEASE INVOLVING NATIVE CORONARY ARTERY OF NATIVE HEART WITHOUT ANGINA PECTORIS: Primary | ICD-10-CM

## 2018-06-29 DIAGNOSIS — E78.00 PURE HYPERCHOLESTEROLEMIA: ICD-10-CM

## 2018-06-29 DIAGNOSIS — R53.82 CHRONIC FATIGUE: ICD-10-CM

## 2018-06-29 DIAGNOSIS — F32.A DEPRESSIVE DISORDER: ICD-10-CM

## 2018-06-29 NOTE — PROGRESS NOTES
The patient presents to the office today with the chief complaint of fatigue    HPI    The patient remains on medications for coronary artery disease and hyperlpidemia. she is tolerating the medications well. The patient denies chest pain or dyspnea   The patient has  complaints of bothersome fatigue. The patient has been tired for the past several months. Se has been anemic in the past.  The patient is stressed over the physical state of her partner. The patient remains depressed. She is doing ok on an SSRI. The patient finds that her gait is unsteady. She \"goes off course at times\". Occasionally she bumps the wall. Review of Systems   Constitutional: Positive for malaise/fatigue. Respiratory: Negative for shortness of breath. Cardiovascular: Negative for chest pain and leg swelling. Allergies   Allergen Reactions    Viibryd [Vilazodone] Anaphylaxis and Vertigo    Coreg [Carvedilol] Other (comments)     Pt states coreg makes her feel like a \"zombie\"    Erythromycin Nausea Only     Severe.  Penicillins Hives       Current Outpatient Prescriptions   Medication Sig Dispense Refill    clopidogrel (PLAVIX) 75 mg tab TAKE ONE TABLET BY MOUTH DAILY 90 Tab 0    escitalopram oxalate (LEXAPRO) 5 mg tablet TAKE 1/2 TO 1 TABLET DAILY 30 Tab 1    losartan (COZAAR) 50 mg tablet TAKE ONE TABLET BY MOUTH TWICE A  Tab 3    atorvastatin (LIPITOR) 40 mg tablet TAKE ONE TABLET BY MOUTH DAILY 90 Tab 1    metoprolol succinate (TOPROL-XL) 50 mg XL tablet TAKE ONE TABLET BY MOUTH DAILY 90 Tab 1    tolterodine ER (DETROL LA) 4 mg ER capsule 1 tablet daily 30 Cap 5    ferrous sulfate (IRON) 325 mg (65 mg iron) tablet Take 1 Tab by mouth Daily (before breakfast). 30 Tab 2    bromfenac (BROMDAY) 0.09 % ophthalmic solution       meclizine (ANTIVERT) 12.5 mg tablet Take 1 Tab by mouth two (2) times daily as needed.  15 Tab 0    omeprazole (PRILOSEC) 40 mg capsule 1 capsule each AM 30 Cap 2    coenzyme q10-vitamin e (COQ10 ) 100-100 mg-unit cap Take  by mouth.  cyanocobalamin (VITAMIN B-12) 1,000 mcg tablet Take 1,000 mcg by mouth daily.  cholecalciferol, vitamin d3, (VITAMIN D) 1,000 unit tablet Take 1,000 Units by mouth daily.  calcium carbonate-vitamin d2 1,200-400 mg-unit Cap Take 1 Cap by mouth daily.  aspirin 81 mg tablet Take 81 mg by mouth daily. Past Medical History:   Diagnosis Date    Cardiac ambulatory blood pressure monitoring 10/25/2011    JNC-7 classification:  Stage 1 hypertension.  Cardiac cath 02/22/2010    mRCA 35%. LM patent. Cx 25%. pLAD 100% (2.75 x 23 mm Cypher stent) LVEDP  20.  EF 45%. Anteroapical mod HK    Cardiac echocardiogram 05/11/2010    EF 60%. Gr 2 DDfx. LAE. Mild MR.  Mild-mod PI; PASP 30 mmHg.  Carotid duplex 08/26/2013    Mild 1-49% bilateral ICA stenosis. Biphasic signals in both subclavian arteries.  Coronary artery disease     acute anterior ST-elevation myocardial infarction/primary stenting of the LAD on 02/22/10/EF 45%.  Coronary atherosclerosis of unspecified type of bypass graft(414.05)     Depressive disorder     Fatigue     Headache(784.0)     Heart disease, unspecified     Iron deficiency anemia, unspecified     Migraine     Migraine headache     Myocardial infarction ([address] 02/22/2010    Acute anterior wall w/primary intubation, stenting of prox LAD w/ 2.75-mm Cypher stent. 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  Senile cataract, unspecified     Valvular heart disease     Mitral valve prolapse. Past Surgical History:   Procedure Laterality Date    HX APPENDECTOMY      HX BREAST LUMPECTOMY      Left.  HX CATARACT REMOVAL  5/1/2013, 5/8/2013    left and right    HX CORONARY STENT PLACEMENT  2/22/10    prox LAD w/ 2.75-mm Cypher stent.     HX HEART CATHETERIZATION      HX TONSILLECTOMY         Social History     Social History    Marital status:      Spouse name: N/A    Number of children: N/A    Years of education: N/A     Occupational History    Not on file. Social History Main Topics    Smoking status: Never Smoker    Smokeless tobacco: Never Used    Alcohol use No    Drug use: No    Sexual activity: No     Other Topics Concern    Not on file     Social History Narrative       Patient does not have an advanced directive on file    Visit Vitals    /60 (BP 1 Location: Left arm, BP Patient Position: Sitting)    Pulse (!) 58    Temp 98.2 °F (36.8 °C) (Tympanic)    Resp 16    Ht 5' (1.524 m)    SpO2 98%       Physical Exam   No Cervical Lymphadenopathy  No Supraclavicular Lymphadenopathy  Thyroid is Normal  Lungs are normal to percussion. Clear to auscultation   Heart:  S1 S2 are normal, No gallops, No mummers  No Carotid Bruits  Abdomen:  Normal Bowel Sounds. No tenderness. No masses. No Hepatomegaly or Splenomegly  LE:  Strong Pedal Pulses. No Edema      BMI:  OK    No visits with results within 3 Month(s) from this visit. Latest known visit with results is:    Hospital Outpatient Visit on 12/11/2017   Component Date Value Ref Range Status    Creatinine, POC 12/11/2017 1.1  0.6 - 1.3 MG/DL Final    GFRAA, POC 12/11/2017 56* >60 ml/min/1.73m2 Final    GFRNA, POC 12/11/2017 46* >60 ml/min/1.73m2 Final    Comment: Estimated GFR is calculated using the IDMS-traceable Modification of Diet in Renal Disease (MDRD) Study equation, reported for both  Americans (GFRAA) and non- Americans (GFRNA), and normalized to 1.73m2 body surface area. The physician must decide which value applies to the patient. The MDRD study equation should only be used in individuals age 25 or older.  It has not been validated for the following: pregnant women, patients with serious comorbid conditions, or on certain medications, or persons with extremes of body size, muscle mass, or nutritional status. .No results found for any visits on 06/29/18. Assessment / Plan      ICD-10-CM ICD-9-CM    1. Coronary artery disease involving native coronary artery of native heart without angina pectoris I25.10 414.01 CBC WITH AUTOMATED DIFF      METABOLIC PANEL, COMPREHENSIVE      CANCELED: LIPID PANEL   2. Pure hypercholesterolemia E78.00 272.0 CBC WITH AUTOMATED DIFF      METABOLIC PANEL, COMPREHENSIVE      CANCELED: LIPID PANEL   3. Depressive disorder F32.9 311    4. Chronic fatigue R53.82 780.79    5. Gait disturbance R26.9 781.2        I asked Esperanza Jc if she has any questions and I answered the questions. Esperanza Jc states that she understands the treatment plan and agrees with the treatment plan  Orders placed for lab work  Physical Therapy      Follow-up Disposition:  Return in about 5 months (around 11/29/2018). I asked Esperanza Jc if she has any questions and I answered the questions. Esperanza Jc states that she understands the treatment plan and agrees with the treatment plan

## 2018-07-11 ENCOUNTER — HOSPITAL ENCOUNTER (OUTPATIENT)
Dept: PHYSICAL THERAPY | Age: 83
Discharge: HOME | End: 2018-07-11
Payer: MEDICARE

## 2018-07-11 PROCEDURE — G8979 MOBILITY GOAL STATUS: HCPCS

## 2018-07-11 PROCEDURE — 97162 PT EVAL MOD COMPLEX 30 MIN: CPT

## 2018-07-11 PROCEDURE — 97110 THERAPEUTIC EXERCISES: CPT

## 2018-07-11 PROCEDURE — G8978 MOBILITY CURRENT STATUS: HCPCS

## 2018-07-11 NOTE — PROGRESS NOTES
[facility], [address] [place] [address]  [phone] fax  Plan of Care/ Statement of Necessity for Physical Therapy Services    Patient name: Karishma Valdivia Start of Care: 2018   Referral source: Patricia Streeter MD : [id number]    Medical Diagnosis: Unsteadiness on feet [R26.81]   Onset Date:18    Treatment Diagnosis: Gait Disturbance   Prior Hospitalization: see medical history Provider#: [id number]   Medications: Verified on Patient summary List    Comorbidities: hx of MI; Bladder problems   Prior Level of Function: Lives in 1-story home with friend; functionally independent      The Plan of Care and following information is based on the information from the initial evaluation. Assessment/ key information: Pt is a 80 y.o. female who presents with c/o gradual onset of standing imbalance in the past 2-3 yrs when amb in the yard or unleveled surfaces that has resulted in an apprehension of falling and the intermittent use of a walking stick. Pt has a hx of 2 falls in the past yr but none in the past 3 months. Pt avoids curbs also due to unsteadiness. Upon exam, Pt exhibited full ROM and LE strength; veering right to left with gait without A.D; and DGI score of 20/24, having to slow down around obstacles and with head turns, and decreased stability on stairs. Pt would benefit from skilled PT for high level static and dynamic balance training to improve Pt's stability and function for decreased fall risk and increased confidence with gait.     Evaluation Complexity History MEDIUM  Complexity : 1-2 comorbidities / personal factors will impact the outcome/ POC ; Examination MEDIUM Complexity : 3 Standardized tests and measures addressing body structure, function, activity limitation and / or participation in recreation  ;Presentation LOW Complexity : Stable, uncomplicated  ;Clinical Decision Making MEDIUM Complexity : FOTO score of 26-74  Overall Complexity Rating: MEDIUM  Problem List: decrease strength, impaired gait/ balance, decrease ADL/ functional abilitiies, decrease activity tolerance and decrease transfer abilities   Treatment Plan may include any combination of the following: Therapeutic exercise, Therapeutic activities, Neuromuscular re-education, Physical agent/modality, Gait/balance training, Manual therapy, Patient education and Stair training  Patient / Family readiness to learn indicated by: asking questions, trying to perform skills and interest  Persons(s) to be included in education: patient (P)  Barriers to Learning/Limitations: None  Patient Goal (s): Walk better; less apprehension of falling.   Patient Self Reported Health Status: good  Rehabilitation Potential: good    Short Term Goals: To be accomplished in 1 weeks:  Goal: Pt to be compliant with initial HEP to increase stability with standing/amb. Status at last note/certification: Established and reviewed with Pt  Long Term Goals: To be accomplished in 5 weeks:  Goal: Pt to increase DGI score to 24/24 to show improved dynamic standing balance and increase safety with community level amb. Status at last note/certification: DGI 36/93  Goal: Pt to amb community distances without gait deviations or need for A.D for increased independence. Status at last note/certification: amb using walking stick intermittently; veers right to left  Goal: Pt to report at least 50% increase in confidence with gait for improved activity tolerance. Status at last note/certification: N/A  Goal: Pt to report FOTO score of 68 pts to show improved function. Status at last note/certification: FOTO 63 pts    Frequency / Duration: Patient to be seen 2 times per week for 5 weeks.     Patient/ Caregiver education and instruction: Diagnosis, prognosis, exercises   [x]  Plan of care has been reviewed with PTA    G-Codes (GP)  Mobility   Current  CJ= 20-39%   Goal  CJ= 20-39%    The severity rating is based on clinical judgment and the FOTO score. Certification Period: 7/11/18 - 8/9/18    Anila Gallo, PT 7/11/2018 1:32 PM  _____________________________________________________________________  I certify that the above Therapy Services are being furnished while the patient is under my care. I agree with the treatment plan and certify that this therapy is necessary.     Physician's Signature:____________________  Date:__________Time:______    Please sign and return to [facility], [address] [place] [address]  [phone] fax

## 2018-07-11 NOTE — MR AVS SNAPSHOT
[address] [facility] 42 
[phone] Patient: Judge Brittle MRN: [id number] GL Visit Information Date & Time Provider Department Dept. Phone Encounter #  
 2018 12:00 PM Laci Medina [address], PT [facility] PT [place]  [phone] [id number] Your Appointments 2018 11:20 AM  
Follow Up with Donald Walters MD  
[facility] ([facility] Appt Note: yearly f/up with Dr. Ajith Thompson [facility] [id number] [address] [id number] [phone] [address] [place] [address] [address] Upcoming Health Maintenance Date Due DTaP/Tdap/Td series (1 - Tdap) 1944 ZOSTER VACCINE AGE 60> 10/22/1983 GLAUCOMA SCREENING Q2Y 1988 Bone Densitometry (Dexa) Screening 1988 MEDICARE YEARLY EXAM 3/14/2018 Influenza Age 5 to Adult 2018 Allergies as of 2018  Review Complete On: 2018 By: Klever Garcia MD  
  
 Severity Noted Reaction Type Reactions Viibryd [Vilazodone] High   Anaphylaxis, Vertigo Coreg [Carvedilol]  10/12/2011    Other (comments) Pt states coreg makes her feel like a \"zombie\" Erythromycin  2011    Nausea Only Severe. Penicillins  2011    Hives Current Immunizations  Reviewed on 10/18/2016 Name Date Influenza High Dose Vaccine PF 10/17/2017, 10/18/2016 Influenza Vaccine (Quad) PF 10/22/2015 Pneumococcal Polysaccharide (PPSV-23) 10/5/2010 Pneumococcal Vaccine (Unspecified Type) 10/17/1996 Not reviewed this visit Vitals OB Status Smoking Status Postmenopausal Never Smoker Your Updated Medication List  
  
ASK your doctor about these medications   
 aspirin 81 mg tablet Take 81 mg by mouth daily. atorvastatin 40 mg tablet Commonly known as:  LIPITOR  
TAKE ONE TABLET BY MOUTH DAILY bromfenac 0.09 % ophthalmic solution Commonly known as:  BROMDAY  
  
 calcium carbonate-vitamin d2 1,200-400 mg-unit Cap Take 1 Cap by mouth daily. clopidogrel 75 mg Tab Commonly known as:  PLAVIX TAKE ONE TABLET BY MOUTH DAILY  
  
 COQ10  100-100 mg-unit Cap Generic drug:  coenzyme q10-vitamin e Take  by mouth.  
  
 escitalopram oxalate 5 mg tablet Commonly known as:  Celesta Murders TAKE 1/2 TO 1 TABLET DAILY ferrous sulfate 325 mg (65 mg iron) tablet Commonly known as:  Iron Take 1 Tab by mouth Daily (before breakfast). losartan 50 mg tablet Commonly known as:  COZAAR  
TAKE ONE TABLET BY MOUTH TWICE A DAY  
  
 meclizine 12.5 mg tablet Commonly known as:  ANTIVERT Take 1 Tab by mouth two (2) times daily as needed. metoprolol succinate 50 mg XL tablet Commonly known as:  TOPROL-XL  
TAKE ONE TABLET BY MOUTH DAILY  
  
 omeprazole 40 mg capsule Commonly known as:  PRILOSEC  
1 capsule each AM  
  
 tolterodine ER 4 mg ER capsule Commonly known as:  DETROL LA  
1 tablet daily VITAMIN B-12 1,000 mcg tablet Generic drug:  cyanocobalamin Take 1,000 mcg by mouth daily. VITAMIN D3 1,000 unit tablet Generic drug:  cholecalciferol Take 1,000 Units by mouth daily. To-Do List   
 07/11/2018 12:00 PM  
  Appointment with Rema Encinas PT at [address] [place] [address] ([phone]) Please provide this summary of care documentation to your next provider. Your primary care clinician is listed as FREDDY DIAZ. If you have any questions after today's visit, please call [phone].

## 2018-07-11 NOTE — PROGRESS NOTES
PT DAILY TREATMENT NOTE - [facility]    Patient Name: oRbin Jeffers  ATWZ:[birth year]  : 1923  [x]  Patient  Verified  Payor: [place] MEDICARE / Plan: [place] MEDICARE PART A & B / Product Type: Medicare /    In time:12:05  Out time:12:40  Total Treatment Time (min): 35  Total Timed Codes (min): 8  1:1 Treatment Time ( only): 35   Visit #: 1 of 10    Treatment Area: Unsteadiness on feet [R26.81]    SUBJECTIVE  Pain Level (0-10 scale): 0/10  Any medication changes, allergies to medications, adverse drug reactions, diagnosis change, or new procedure performed?: [x] No    [] Yes (see summary sheet for update)  Subjective functional status/changes:   [x] See paper initial evaluation form in patient chart      OBJECTIVE    27 min [x]Eval                  []Re-Eval     8 min Therapeutic Exercise:  [] See flow sheet : HEP given and reviewed with Pt   Rationale: increase strength and improve balance to improve the patients ability to amb confidently on various surfaces          With   [x] TE   [] TA   [] neuro   [] other: Patient Education: [x] Review HEP    [] Progressed/Changed HEP based on:   [] positioning   [] body mechanics   [] transfers   [] heat/ice application    [] other:      Other Objective/Functional Measures: FOTO 63 pts     Pain Level (0-10 scale) post treatment: 0/10    ASSESSMENT/Changes in Function:     Patient will continue to benefit from skilled PT services to address functional mobility deficits, address strength deficits, analyze and cue movement patterns, analyze and modify body mechanics/ergonomics, assess and modify postural abnormalities, address imbalance/dizziness and instruct in home and community integration to attain remaining goals.      [x]  See Plan of Care         PLAN  []  Upgrade activities as tolerated     [x]  Continue plan of care  []  Update interventions per flow sheet       []  Discharge due to:_  []  Other:_      Jade Gallo, PT 2018  1:32 PM

## 2018-07-13 ENCOUNTER — HOSPITAL ENCOUNTER (OUTPATIENT)
Dept: PHYSICAL THERAPY | Age: 83
Discharge: HOME | End: 2018-07-13
Payer: MEDICARE

## 2018-07-13 PROCEDURE — 97112 NEUROMUSCULAR REEDUCATION: CPT

## 2018-07-13 NOTE — PROGRESS NOTES
PT DAILY TREATMENT NOTE - [facility]     Patient Name: Boaz Miller  AKY  : 1923  [x]  Patient  Verified  Payor: Rama Paul / Plan: [place] MEDICARE PART A & B / Product Type: Medicare /    In time:330  Out time:358  Total Treatment Time (min): 28  Total Timed Codes (min): 28  1:1 Treatment Time ( [address] only): 28   Visit #: 2 of 10    Treatment Area: Unsteadiness on feet [R26.81]    SUBJECTIVE  Pain Level (0-10 scale): 0  Any medication changes, allergies to medications, adverse drug reactions, diagnosis change, or new procedure performed?: [x] No    [] Yes (see summary sheet for update)  Subjective functional status/changes:   [] No changes reported  I did the exercises.      OBJECTIVE    Modality rationale:    Min Type Additional Details    [] Estim:  []Unatt       []IFC  []Premod                        []Other:  []w/ice   []w/heat  Position:  Location:    [] Estim: []Att    []TENS instruct  []NMES                    []Other:  []w/US   []w/ice   []w/heat  Position:  Location:    []  Traction: [] Cervical       []Lumbar                       [] Prone          []Supine                       []Intermittent   []Continuous Lbs:  [] before manual  [] after manual    []  Ultrasound: []Continuous   [] Pulsed                           []1MHz   []3MHz W/cm2:  Location:    []  Iontophoresis with dexamethasone         Location: [] Take home patch   [] In clinic    []  Ice     []  heat  []  Ice massage  []  Laser   []  Anodyne Position:  Location:    []  Laser with stim  []  Other:  Position:  Location:    []  Vasopneumatic Device Pressure:       [] lo [] med [] hi   Temperature: [] lo [] med [] hi   [] Skin assessment post-treatment:  []intact -redness- no adverse reaction    -redness  adverse reaction:     28 min Neuromuscular Re-education:  [x]  See flow sheet :   Rationale: increase strength, improve coordination, improve balance and increase proprioception  to improve the patients ability to improve stability with mobility       With   [] TE   [] TA   [] neuro   [] other: Patient Education: [x] Review HEP    [] Progressed/Changed HEP based on:   [] positioning   [] body mechanics   [] transfers   [] heat/ice application    [] other:      Other Objective/Functional Measures: initiated exercises per flow sheet     Pain Level (0-10 scale) post treatment: 0    ASSESSMENT/Changes in Function: CGA with Rhomberg on foam with eyes closed, with SLS. Demonstrated decreased speed but good stability with amb with head turns and around/over obstacles. Patient will continue to benefit from skilled PT services to modify and progress therapeutic interventions, address functional mobility deficits, address ROM deficits, address strength deficits, analyze and address soft tissue restrictions, analyze and cue movement patterns, analyze and modify body mechanics/ergonomics, assess and modify postural abnormalities, address imbalance/dizziness and instruct in home and community integration to attain remaining goals. []  See Plan of Care  []  See progress note/recertification  []  See Discharge Summary         Progress towards goals / Updated goals:  Short Term Goals: To be accomplished in 1 weeks:  Goal: Pt to be compliant with initial HEP to increase stability with standing/amb. Status at last note/certification: Established and reviewed with Pt  Long Term Goals: To be accomplished in 5 weeks:  Goal: Pt to increase DGI score to 24/24 to show improved dynamic standing balance and increase safety with community level amb. Status at last note/certification: DGI 69/74  Goal: Pt to amb community distances without gait deviations or need for A.D for increased independence. Status at last note/certification: amb using walking stick intermittently; veers right to left  Goal: Pt to report at least 50% increase in confidence with gait for improved activity tolerance.   Status at last note/certification: N/A  Goal: Pt to report FOTO score of 68 pts to show improved function.   Status at last note/certification: FOTO 63 pts    PLAN  []  Upgrade activities as tolerated     [x]  Continue plan of care  []  Update interventions per flow sheet       []  Discharge due to:_  []  Other:_      Vargas Cordero, SPT  Arsh Mitchell, PT 7/13/2018  3:41 PM    Future Appointments  Date Time Provider Augusta Coffman   7/17/2018 11:00 AM Arsh Mitchell, PT [facility]   7/20/2018 3:30 PM Duarte Escalona, PT [facility]   7/23/2018 11:00 AM Arsh Mitchell, PT [facility]   7/25/2018 11:30 AM Geovanni Gallo, PT [facility]   7/30/2018 2:30 PM [facility]   7/31/2018 11:20 AM Syed Garces MD [address] [place] [address]

## 2018-07-17 ENCOUNTER — HOSPITAL ENCOUNTER (OUTPATIENT)
Dept: PHYSICAL THERAPY | Age: 83
Discharge: HOME | End: 2018-07-17
Payer: MEDICARE

## 2018-07-17 PROCEDURE — 97112 NEUROMUSCULAR REEDUCATION: CPT

## 2018-07-17 NOTE — PROGRESS NOTES
PT DAILY TREATMENT NOTE - [facility]     Patient Name: Jakub Jones  IILW:  : 1923  [x]  Patient  Verified  Payor: Jami Delgadillo / Plan: [place] MEDICARE PART A & B / Product Type: Medicare /    In time:1104  Out time:1133  Total Treatment Time (min): 29  Total Timed Codes (min): 29  1:1 Treatment Time ( only): 29   Visit #: 3 of 10    Treatment Area: Unsteadiness on feet [R26.81]    SUBJECTIVE  Pain Level (0-10 scale): 0  Any medication changes, allergies to medications, adverse drug reactions, diagnosis change, or new procedure performed?: [x] No    [] Yes (see summary sheet for update)  Subjective functional status/changes:   [] No changes reported  I was here for the same thing years ago.      OBJECTIVE    Modality rationale:    Min Type Additional Details    [] Estim:  []Unatt       []IFC  []Premod                        []Other:  []w/ice   []w/heat  Position:  Location:    [] Estim: []Att    []TENS instruct  []NMES                    []Other:  []w/US   []w/ice   []w/heat  Position:  Location:    []  Traction: [] Cervical       []Lumbar                       [] Prone          []Supine                       []Intermittent   []Continuous Lbs:  [] before manual  [] after manual    []  Ultrasound: []Continuous   [] Pulsed                           []1MHz   []3MHz W/cm2:  Location:    []  Iontophoresis with dexamethasone         Location: [] Take home patch   [] In clinic    []  Ice     []  heat  []  Ice massage  []  Laser   []  Anodyne Position:  Location:    []  Laser with stim  []  Other:  Position:  Location:    []  Vasopneumatic Device Pressure:       [] lo [] med [] hi   Temperature: [] lo [] med [] hi   [] Skin assessment post-treatment:  []intact []redness- no adverse reaction    []redness  adverse reaction:     29 min Neuromuscular Re-education:  [x]  See flow sheet :   Rationale: improve coordination, improve balance and increase proprioception  to improve the patients ability to improve stability with mobility    With   [] TE   [] TA   [] neuro   [] other: Patient Education: [x] Review HEP    [] Progressed/Changed HEP based on:   [] positioning   [] body mechanics   [] transfers   [] heat/ice application    [] other:      Other Objective/Functional Measures: completed exercises per flow sheet     Pain Level (0-10 scale) post treatment: 0    ASSESSMENT/Changes in Function: Required 1 UE support for SLS. Decreased speed, one instance of LOB with dynamic balance, stepping over bolster. Patient will continue to benefit from skilled PT services to modify and progress therapeutic interventions, address functional mobility deficits, address ROM deficits, address strength deficits, analyze and address soft tissue restrictions, analyze and cue movement patterns, analyze and modify body mechanics/ergonomics, assess and modify postural abnormalities, address imbalance/dizziness and instruct in home and community integration to attain remaining goals. []  See Plan of Care  []  See progress note/recertification  []  See Discharge Summary         Progress towards goals / Updated goals:  Short Term Goals: To be accomplished in 1 weeks:  Goal: Pt to be compliant with initial HEP to increase stability with standing/amb. Status at last note/certification: Established and reviewed with Pt  Current status:   Long Term Goals: To be accomplished in 5 weeks:  Goal: Pt to increase DGI score to 24/24 to show improved dynamic standing balance and increase safety with community level amb. Status at last note/certification: DGI 02/57  Current status:   Goal: Pt to amb community distances without gait deviations or need for A.D for increased independence. Status at last note/certification: amb using walking stick intermittently; veers right to left  Current status:   Goal: Pt to report at least 50% increase in confidence with gait for improved activity tolerance.   Status at last note/certification: N/A  Current status:   Goal: Pt to report FOTO score of 68 pts to show improved function.   Status at last note/certification: FOTO 63 pts  Current status:     PLAN  [x]  Upgrade activities as tolerated     [x]  Continue plan of care  []  Update interventions per flow sheet       []  Discharge due to:_  []  Other:_      Samuel Prajapatijabier, SPT  Radha Hernandez, PT 7/17/2018  11:23 AM    Future Appointments  Date Time Provider Augusta Coffman   7/20/2018 3:30 PM Gianni Iniguez, PT [facility]   7/23/2018 11:00 AM Radha Hernandez, PT [facility]   7/25/2018 11:30 AM Floresita Gallo, PT [facility]   7/30/2018 2:30 PM Radha Hernandez, PT [facility]   7/31/2018 11:20 AM Brent Pond MD [address] [place] [address]

## 2018-07-20 ENCOUNTER — HOSPITAL ENCOUNTER (OUTPATIENT)
Dept: PHYSICAL THERAPY | Age: 83
Discharge: HOME | End: 2018-07-20
Payer: MEDICARE

## 2018-07-20 DIAGNOSIS — N32.81 OVERACTIVE BLADDER: ICD-10-CM

## 2018-07-20 PROCEDURE — 97112 NEUROMUSCULAR REEDUCATION: CPT | Performed by: PHYSICAL THERAPIST

## 2018-07-20 RX ORDER — TOLTERODINE 4 MG/1
CAPSULE, EXTENDED RELEASE ORAL
Qty: 90 CAP | Refills: 1 | Status: SHIPPED | OUTPATIENT
Start: 2018-07-20 | End: 2019-06-07 | Stop reason: SDUPTHER

## 2018-07-20 RX ORDER — TOLTERODINE 4 MG/1
CAPSULE, EXTENDED RELEASE ORAL
Qty: 30 CAP | Refills: 4 | Status: SHIPPED | OUTPATIENT
Start: 2018-07-20 | End: 2018-07-31 | Stop reason: SDUPTHER

## 2018-07-20 RX ORDER — ESCITALOPRAM OXALATE 5 MG/1
TABLET ORAL
Qty: 90 TAB | Refills: 1 | Status: SHIPPED | OUTPATIENT
Start: 2018-07-20 | End: 2019-01-07 | Stop reason: SDUPTHER

## 2018-07-20 RX ORDER — METOPROLOL SUCCINATE 50 MG/1
TABLET, EXTENDED RELEASE ORAL
Qty: 30 TAB | Refills: 0 | Status: SHIPPED | OUTPATIENT
Start: 2018-07-20 | End: 2018-07-31 | Stop reason: SDUPTHER

## 2018-07-20 RX ORDER — METOPROLOL SUCCINATE 50 MG/1
TABLET, EXTENDED RELEASE ORAL
Qty: 90 TAB | Refills: 1 | Status: SHIPPED | OUTPATIENT
Start: 2018-07-20 | End: 2019-02-06 | Stop reason: SDUPTHER

## 2018-07-20 NOTE — TELEPHONE ENCOUNTER
Requested Prescriptions     Pending Prescriptions Disp Refills    metoprolol succinate (TOPROL-XL) 50 mg XL tablet 90 Tab 1    tolterodine ER (DETROL LA) 4 mg ER capsule 30 Cap 5     Si tablet daily    escitalopram oxalate (LEXAPRO) 5 mg tablet 30 Tab 1     Patient is asking if she could get them soon

## 2018-07-20 NOTE — PROGRESS NOTES
PT DAILY TREATMENT NOTE - [facility]     Patient Name: Damaso Dempsey  LAJS:  : 1923  [x]  Patient  Verified  Payor: [place] MEDICARE / Plan: VA MEDICARE PART A & B / Product Type: Medicare /    In time:3:30  Out time:4:10  Total Treatment Time (min): 40  Total Timed Codes (min): 40  1:1 Treatment Time ( [address] only): 12   Visit #: 4 of 10    Treatment Area: Unsteadiness on feet [R26.81]    SUBJECTIVE  Pain Level (0-10 scale): 0  Any medication changes, allergies to medications, adverse drug reactions, diagnosis change, or new procedure performed?: [x] No    [] Yes (see summary sheet for update)  Subjective functional status/changes:   [] No changes reported  Doing well but still feels unstable - no recent falls. Wishes she had asked for therapy sooner. Continues to drive (I). OBJECTIVE    40 min Neuromuscular Re-education:  []  See flow sheet :   Rationale: increase strength, improve coordination, improve balance and increase proprioception  to improve the patients ability to walk with decreased fall risk          With   [] TE   [] TA   [] neuro   [] other: Patient Education: [x] Review HEP    [] Progressed/Changed HEP based on:   [] positioning   [] body mechanics   [] transfers   [] heat/ice application    [] other:      Other Objective/Functional Measures:   Worked on SLS in parallel bars with hip x 3 motions without UE support - able to take some protective steps    Curbs outside - must use rail to be safe    Worked on gait with longer strides and increased arm swing     Pain Level (0-10 scale) post treatment: 0    ASSESSMENT/Changes in Function:  Increased ease with obstacle course but struggles with SLS on either side with movement of opposite leg; great improvement noted in gait with longer steps and increased arm swing; must use hand rail for curbs to be safe.     Patient will continue to benefit from skilled PT services to modify and progress therapeutic interventions, address functional mobility deficits, address strength deficits, analyze and cue movement patterns, analyze and modify body mechanics/ergonomics, assess and modify postural abnormalities, address imbalance/dizziness and instruct in home and community integration to attain remaining goals. []  See Plan of Care  []  See progress note/recertification  []  See Discharge Summary    Progress towards goals / Updated goals:  Short Term Goals: To be accomplished in 1 weeks:  Goal: Pt to be compliant with initial HEP to increase stability with standing/amb. Status at last note/certification: Established and reviewed with Pt  Current status:  Met - reports compliance  Long Term Goals: To be accomplished in 5 weeks:  Goal: Pt to increase DGI score to 24/24 to show improved dynamic standing balance and increase safety with community level amb. Status at last note/certification: DGI 64/54  Current status:   Goal: Pt to amb community distances without gait deviations or need for A.D for increased independence. Status at last note/certification: amb using walking stick intermittently; veers right to left  Current status:   Goal: Pt to report at least 50% increase in confidence with gait for improved activity tolerance. Status at last note/certification: N/A  Current status:   Goal: Pt to report FOTO score of 68 pts to show improved function.   Status at last note/certification: FOTO 63 pts  Current status:     PLAN  [x]  Upgrade activities as tolerated     [x]  Continue plan of care  []  Update interventions per flow sheet       []  Discharge due to:_  []  Other:_      Jcarlos Singh, PT 7/20/2018  4:33 PM    Future Appointments  Date Time Provider Augusta Coffman   7/23/2018 11:00 AM Connor Sellers, PT [facility]   7/25/2018 11:30 AM Theresa Gallo, PT Katie Hernandez   7/30/2018 2:30 PM Connor Sellers, PT Katie Hernandez   7/31/2018 11:20 AM Elizabeth Brady MD [address] [place] [address]

## 2018-07-23 ENCOUNTER — HOSPITAL ENCOUNTER (OUTPATIENT)
Dept: PHYSICAL THERAPY | Age: 83
Discharge: HOME | End: 2018-07-23
Payer: MEDICARE

## 2018-07-23 PROCEDURE — 97112 NEUROMUSCULAR REEDUCATION: CPT

## 2018-07-23 NOTE — PROGRESS NOTES
PT DAILY TREATMENT NOTE - [facility]     Patient Name: Ansley Wise  IEWI:  : 1923  [x]  Patient  Verified  Payor: Kelly Rubio / Plan: [place] MEDICARE PART A & B / Product Type: Medicare /    In time:1100  Out time:1136  Total Treatment Time (min): 36  Total Timed Codes (min): 36  1:1 Treatment Time ( [address] only): 25   Visit #: 5 of 10    Treatment Area: Unsteadiness on feet [R26.81]    SUBJECTIVE  Pain Level (0-10 scale): 0  Any medication changes, allergies to medications, adverse drug reactions, diagnosis change, or new procedure performed?: [x] No    [] Yes (see summary sheet for update)  Subjective functional status/changes:   [] No changes reported  I had a lot of hip pain the past two days, I never have pain. Tylenol didn't do anything. I'm fine now.      OBJECTIVE    Modality rationale:    Min Type Additional Details    [] Estim:  []Unatt       []IFC  []Premod                        []Other:  []w/ice   []w/heat  Position:  Location:    [] Estim: []Att    []TENS instruct  []NMES                    []Other:  []w/US   []w/ice   []w/heat  Position:  Location:    []  Traction: [] Cervical       []Lumbar                       [] Prone          []Supine                       []Intermittent   []Continuous Lbs:  [] before manual  [] after manual    []  Ultrasound: []Continuous   [] Pulsed                           []1MHz   []3MHz W/cm2:  Location:    []  Iontophoresis with dexamethasone         Location: [] Take home patch   [] In clinic    []  Ice     []  heat  []  Ice massage  []  Laser   []  Anodyne Position:  Location:    []  Laser with stim  []  Other:  Position:  Location:    []  Vasopneumatic Device Pressure:       [] lo [] med [] hi   Temperature: [] lo [] med [] hi   [] Skin assessment post-treatment:  []intact []redness- no adverse reaction    []redness  adverse reaction:     36 min Neuromuscular Re-education:  [x]  See flow sheet :   Rationale: increase strength, improve coordination, improve balance and increase proprioception  to improve the patients ability to improve stability with mobility       With   [] TE   [] TA   [] neuro   [] other: Patient Education: [x] Review HEP    [] Progressed/Changed HEP based on:   [] positioning   [] body mechanics   [] transfers   [] heat/ice application    [] other:      Other Objective/Functional Measures: see goals     Pain Level (0-10 scale) post treatment: 0    ASSESSMENT/Changes in Function: Patient reports increased pain after last session with performing hip x 3 so held today. Continues to require 1 UE support for SLS. Improved DGI score by 1 point. Patient will continue to benefit from skilled PT services to modify and progress therapeutic interventions, address functional mobility deficits, address ROM deficits, address strength deficits, analyze and address soft tissue restrictions, analyze and cue movement patterns, analyze and modify body mechanics/ergonomics, assess and modify postural abnormalities, address imbalance/dizziness and instruct in home and community integration to attain remaining goals. []  See Plan of Care  []  See progress note/recertification  []  See Discharge Summary         Progress towards goals / Updated goals:  Short Term Goals: To be accomplished in 1 weeks:  Goal: Pt to be compliant with initial HEP to increase stability with standing/amb. Status at last note/certification: Established and reviewed with Pt  Current status:  Met - reports compliance  Long Term Goals: To be accomplished in 5 weeks:  Goal: Pt to increase DGI score to 24/24 to show improved dynamic standing balance and increase safety with community level amb. Status at last note/certification: DGI 68/54  Current status: Progressing, 21/24   Goal: Pt to amb community distances without gait deviations or need for A.D for increased independence.   Status at last note/certification: amb using walking stick intermittently; veers right to left  Current status:   Goal: Pt to report at least 50% increase in confidence with gait for improved activity tolerance. Status at last note/certification: N/A  Current status:   Goal: Pt to report FOTO score of 68 pts to show improved function.   Status at last note/certification: FOTO 63 pts  Current status: Not met, 61    PLAN  [x]  Upgrade activities as tolerated     [x]  Continue plan of care  []  Update interventions per flow sheet       []  Discharge due to:_  []  Other:_      Kareem Eric PT 7/23/2018  9:54 AM    Future Appointments  Date Time Provider Augusta Coffman   7/23/2018 11:00 AM Kareem Eric, PT [facility]   7/25/2018 11:30 AM Malika Gallo, PT [facility]   7/30/2018 2:30 PM Kareem Eric PT [facility]   7/31/2018 11:20 AM Suzan Resendiz MD [address] [place] [address]

## 2018-07-25 ENCOUNTER — HOSPITAL ENCOUNTER (OUTPATIENT)
Dept: PHYSICAL THERAPY | Age: 83
Discharge: HOME | End: 2018-07-25
Payer: MEDICARE

## 2018-07-25 PROCEDURE — 97112 NEUROMUSCULAR REEDUCATION: CPT

## 2018-07-25 NOTE — PROGRESS NOTES
PT DAILY TREATMENT NOTE - [facility]     Patient Name: Lidia Maier  FKXP:  : 1923  [x]  Patient  Verified  Payor: Brigitte Henderson / Plan: [place] MEDICARE PART A & B / Product Type: Medicare /    In time:11:30  Out time:12:00  Total Treatment Time (min): 30  Total Timed Codes (min): 30  1:1 Treatment Time ( only): 30  Visit #: 6 of 10    Treatment Area: Unsteadiness on feet [R26.81]    SUBJECTIVE  Pain Level (0-10 scale): 0/10  Any medication changes, allergies to medications, adverse drug reactions, diagnosis change, or new procedure performed?: [x] No    [] Yes (see summary sheet for update)  Subjective functional status/changes:   [] No changes reported  \"I have no pain. \"     OBJECTIVE    30 min Neuromuscular Re-education:  [x]  See flow sheet :   Rationale: increase strength, improve coordination, improve balance and increase proprioception  to improve the patients ability to improve stability with mobility       With   [] TE   [] TA   [] neuro   [] other: Patient Education: [x] Review HEP    [] Progressed/Changed HEP based on:   [] positioning   [] body mechanics   [] transfers   [] heat/ice application    [] other:      Other Objective/Functional Measures: Progressed hold times for all balance activities. Performed step ups outside at staircase and negotiated curbs in front of building with light HHA x 1 from PT. Pain Level (0-10 scale) post treatment: 0/10    ASSESSMENT/Changes in Function: Pt challenged with static balance with eyes closed and SLS, requiring 1 UE support. Pt challenged with stepping up onto curb leading with left LE due to weakness.     Patient will continue to benefit from skilled PT services to modify and progress therapeutic interventions, address functional mobility deficits, address ROM deficits, address strength deficits, analyze and address soft tissue restrictions, analyze and cue movement patterns, analyze and modify body mechanics/ergonomics, assess and modify postural abnormalities, address imbalance/dizziness and instruct in home and community integration to attain remaining goals. []  See Plan of Care  []  See progress note/recertification  []  See Discharge Summary         Progress towards goals / Updated goals:  Short Term Goals: To be accomplished in 1 weeks:  Goal: Pt to be compliant with initial HEP to increase stability with standing/amb. Status at last note/certification: Established and reviewed with Pt  Current status:  Met - reports compliance  Long Term Goals: To be accomplished in 5 weeks:  Goal: Pt to increase DGI score to 24/24 to show improved dynamic standing balance and increase safety with community level amb. Status at last note/certification: DGI 08/89  Current status: Progressing, DGI 21/24   Goal: Pt to amb community distances without gait deviations or need for A.D for increased independence. Status at last note/certification: amb using walking stick intermittently; veers right to left  Current status: progressing - slowed yadira, slight veering from right to left without A.D. Goal: Pt to report at least 50% increase in confidence with gait for improved activity tolerance. Status at last note/certification: N/A  Current status:   Goal: Pt to report FOTO score of 68 pts to show improved function.   Status at last note/certification: FOTO 63 pts  Current status: Not met, FOTO 59 pts    PLAN  [x]  Upgrade activities as tolerated     [x]  Continue plan of care  []  Update interventions per flow sheet       []  Discharge due to:_  []  Other:_      Twyla Gallo, PT 7/25/2018  9:54 AM    Future Appointments  Date Time Provider Augusta Coffman   7/30/2018 2:30 PM Leland Jessica, PT [facility]   7/31/2018 11:20 AM Raul Lyle MD [address] [place] [address]

## 2018-07-30 ENCOUNTER — HOSPITAL ENCOUNTER (OUTPATIENT)
Dept: PHYSICAL THERAPY | Age: 83
Discharge: HOME | End: 2018-07-30
Payer: MEDICARE

## 2018-07-30 PROCEDURE — 97112 NEUROMUSCULAR REEDUCATION: CPT

## 2018-07-30 NOTE — PROGRESS NOTES
PT DAILY TREATMENT NOTE - [facility]  Patient Name: Martin Haynes Date:2018 : 1923 [x]  Patient  Verified Payor: [place] MEDICARE / Plan: Elizabeth Hameed y / Product Type: Medicare / In time:230  Out time:255 Total Treatment Time (min): 25 Total Timed Codes (min): 25 
1:1 Treatment Time ( only): 20 Visit #: 7 of 10 Treatment Area: Unsteadiness on feet [R26.81] SUBJECTIVE Pain Level (0-10 scale): 0 Any medication changes, allergies to medications, adverse drug reactions, diagnosis change, or new procedure performed?: [x] No    [] Yes (see summary sheet for update) Subjective functional status/changes:   [] No changes reported I can walk around [place]. I'm not very sure of myself yet. OBJECTIVE Modality rationale:   
Min Type Additional Details  
 [] Estim:  []Unatt       []IFC  []Premod []Other:  []w/ice   []w/heat Position: Location:  
 [] Estim: []Att    []TENS instruct  []NMES []Other:  []w/US   []w/ice   []w/heat Position: Location:  
 []  Traction: [] Cervical       []Lumbar 
                     [] Prone          []Supine []Intermittent   []Continuous Lbs: 
[] before manual 
[] after manual  
 []  Ultrasound: []Continuous   [] Pulsed []1MHz   []3MHz W/cm2: 
Location:  
 []  Iontophoresis with dexamethasone Location: [] Take home patch  
[] In clinic  
 []  Ice     []  heat 
[]  Ice massage 
[]  Laser  
[]  Anodyne Position: Location:  
 []  Laser with stim 
[]  Other:  Position: Location:  
 []  Vasopneumatic Device Pressure:       [] lo [] med [] hi  
Temperature: [] lo [] med [] hi  
[] Skin assessment post-treatment:  []intact []redness- no adverse reaction 
  []redness  adverse reaction:  
 
25 min Neuromuscular Re-education:  [x]  See flow sheet :  
Rationale: increase strength, improve coordination, improve balance and increase proprioception  to improve the patients ability to improve stability with mobility With 
 [] TE 
 [] TA 
 [] neuro 
 [] other: Patient Education: [x] Review HEP [] Progressed/Changed HEP based on:  
[] positioning   [] body mechanics   [] transfers   [] heat/ice application   
[] other:   
 
Other Objective/Functional Measures: Completed exercises as able Pain Level (0-10 scale) post treatment: 0 
 
ASSESSMENT/Changes in Function: Patient demonstrated significant difference in LE strength with the curb step ups. She was able to perform 10 steps on the right with UE support, but was noticeable unsteady after 3-4 on the Left. Overall patient reports ability to amb[facility] but still lacks confidence with ambulating in the community. Patient will continue to benefit from skilled PT services to modify and progress therapeutic interventions, address functional mobility deficits, address ROM deficits, address strength deficits, analyze and address soft tissue restrictions, analyze and cue movement patterns, analyze and modify body mechanics/ergonomics, assess and modify postural abnormalities, address imbalance/dizziness and instruct in home and community integration to attain remaining goals. []  See Plan of Care 
[]  See progress note/recertification 
[]  See Discharge Summary Progress towards goals / Updated goals: 
Short Term Goals: To be accomplished in 1 weeks: 
Goal: Pt to be compliant with initial HEP to increase stability with standing/amb. Status at last note/certification: Established and reviewed with Pt Current status:  Met - reports compliance Long Term Goals: To be accomplished in 5 weeks: 
Goal: Pt to increase DGI score to 24/24 to show improved dynamic standing balance and increase safety with community level amb. Status at last note/certification: DGI 99/90 Current status: Progressing, DGI 21/24 Goal: Pt to amb community distances without gait deviations or need for A.D for increased independence. Status at last note/certification: amb using walking stick intermittently; veers right to left Current status: progressing - slowed yadira, slight veering from right to left without A.D. Goal: Pt to report at least 50% increase in confidence with gait for improved activity tolerance. Status at last note/certification: N/A Current status: Progressing, continues to be unsure of herself Goal: Pt to report FOTO score of 68 pts to show improved function. Status at last note/certification: FOTO 63 pts Current status: Not met, FOTO 59 pts PLAN [x]  Upgrade activities as tolerated     [x]  Continue plan of care 
[]  Update interventions per flow sheet      
[]  Discharge due to:_ 
[]  Other:_   
 
Barron Durbin, KP Pickens, PT 7/30/2018  1:53 PM 
 
Future Appointments Date Time Provider Augusta Coffman 7/30/2018 2:30 PM Chyna Pickens, PT [facility]  
7/31/2018 11:20 AM Cindy Avila MD [address] [place] [address]

## 2018-07-31 ENCOUNTER — OFFICE VISIT (OUTPATIENT)
Dept: CARDIOLOGY CLINIC | Age: 83
End: 2018-07-31

## 2018-07-31 VITALS
SYSTOLIC BLOOD PRESSURE: 160 MMHG | HEIGHT: 60 IN | BODY MASS INDEX: 22.19 KG/M2 | WEIGHT: 113 LBS | OXYGEN SATURATION: 98 % | DIASTOLIC BLOOD PRESSURE: 66 MMHG | HEART RATE: 59 BPM

## 2018-07-31 DIAGNOSIS — R42 DIZZINESS: ICD-10-CM

## 2018-07-31 DIAGNOSIS — R09.89 LEFT CAROTID BRUIT: ICD-10-CM

## 2018-07-31 DIAGNOSIS — I25.10 CORONARY ARTERY DISEASE INVOLVING NATIVE CORONARY ARTERY OF NATIVE HEART WITHOUT ANGINA PECTORIS: Primary | ICD-10-CM

## 2018-07-31 DIAGNOSIS — I10 ESSENTIAL HYPERTENSION, BENIGN: ICD-10-CM

## 2018-07-31 DIAGNOSIS — H34.9 RETINAL EMBOLI, RIGHT: ICD-10-CM

## 2018-07-31 NOTE — PROGRESS NOTES
1. Have you been to the ER, urgent care clinic since your last visit? Hospitalized since your last visit? No    2. Have you seen or consulted any other health care providers outside of the [address] [place] [address] since your last visit? Include any pap smears or colon screening.  No

## 2018-07-31 NOTE — PROGRESS NOTES
HISTORY OF PRESENT ILLNESS  Brittany Charles is a 80 y.o. female. HPI  She looks amazing for her age of 80. She does not even have a significant hearing loss. Other than occasional dizziness, she has no complaints. She denies chest pain, dyspnea, orthopnea, PND. She denies palpitation, dizziness or syncope. She denies any symptoms of TIA or amaurosis fugax. She has a history of mitral valve prolapse and a single isolated episode of loss of central vision of her right eye which was thought to be due to peripheral embolization from the mitral valve prolapse. She was placed on Inderal LA, which was not tolerated well. This was for migraines. Unexpectedly she had acute anterior ST-elevation myocardial infarction on February 22, 2010, and underwent primary intubation. Her cardiac catheterization demonstrated:    1. A 30% to 40% long stenosis of the dominant RCA in the mid segment. 2. Patent left main trunk. 3. A 20% to 30% stenosis of the circumflex coronary artery diffusely. 4. Total occlusion of the LAD in the proximal segment, with faint left-to-left collaterals. 5. Moderate anteroapical hypokinesis of the left ventricle, with EF in the 45% range. The LAD was subsequently intervened upon and stented with a 2.75-mm Cypher stent successfully. She had repeat echocardiogram on May 11, 2010 which demonstrated improved LV function with EF now up to 60%.    She had ambulatory blood pressure recording on 10/25/2011, which demonstrated mild hypertension with mean ambulatory blood pressure recordings of 148/64 on the current medical regimen. Review of Systems   Constitutional: Positive for malaise/fatigue. Negative for weight loss. HENT: Negative for hearing loss. Eyes: Negative for blurred vision and double vision. Respiratory: Negative for shortness of breath. Cardiovascular: Negative for chest pain, palpitations, orthopnea, claudication, leg swelling and PND.    Gastrointestinal: Negative for blood in stool, heartburn and melena. Genitourinary: Negative for dysuria, frequency, hematuria and urgency. Musculoskeletal: Positive for falls. Negative for back pain and joint pain. Skin: Negative for itching and rash. Neurological: Negative for dizziness, loss of consciousness and weakness. Psychiatric/Behavioral: Negative for depression and memory loss. Physical Exam   Constitutional: She is oriented to person, place, and time. She appears well-developed and well-nourished. HENT:   Head: Normocephalic and atraumatic. Eyes: Conjunctivae are normal. Pupils are equal, round, and reactive to light. Neck: Normal range of motion. Neck supple. No JVD present. Cardiovascular: Normal rate, regular rhythm, S1 normal and S2 normal.   No extrasystoles are present. PMI is not displaced. Exam reveals no gallop and no friction rub. No murmur heard. Pulses:       Carotid pulses are 3+ on the right side with bruit, and 3+ on the left side with bruit. Pulmonary/Chest: Effort normal. She has no rales. Abdominal: Soft. There is no tenderness. Musculoskeletal: She exhibits no edema. Neurological: She is alert and oriented to person, place, and time. No cranial nerve deficit. Skin: Skin is warm and dry. Psychiatric: She has a normal mood and affect. Her behavior is normal.     Visit Vitals    /66    Pulse (!) 59    Ht 5' (1.524 m)    Wt 51.3 kg (113 lb)    SpO2 98%    BMI 22.07 kg/m2       Past Medical History:   Diagnosis Date    Cardiac ambulatory blood pressure monitoring 10/25/2011    JNC-7 classification:  Stage 1 hypertension.  Cardiac cath 02/22/2010    mRCA 35%. LM patent. Cx 25%. pLAD 100% (2.75 x 23 mm Cypher stent) LVEDP  20.  EF 45%. Anteroapical mod HK    Cardiac echocardiogram 05/11/2010    EF 60%. Gr 2 DDfx. LAE. Mild MR.  Mild-mod PI; PASP 30 mmHg.  Carotid duplex 08/26/2013    Mild 1-49% bilateral ICA stenosis. Biphasic signals in both subclavian arteries.  Coronary artery disease     acute anterior ST-elevation myocardial infarction/primary stenting of the LAD on 02/22/10/EF 45%.  Coronary atherosclerosis of unspecified type of bypass graft(414.05)     Depressive disorder     Fatigue     Headache(784.0)     Heart disease, unspecified     Iron deficiency anemia, unspecified     Migraine     Migraine headache     Myocardial infarction ([facility] [address] 02/22/2010    Acute anterior wall w/primary intubation, stenting of prox LAD w/ 2.75-mm Cypher stent. 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  Senile cataract, unspecified     Valvular heart disease     Mitral valve prolapse. Social History     Social History    Marital status:      Spouse name: N/A    Number of children: N/A    Years of education: N/A     Occupational History    Not on file. Social History Main Topics    Smoking status: Never Smoker    Smokeless tobacco: Never Used    Alcohol use No    Drug use: No    Sexual activity: No     Other Topics Concern    Not on file     Social History Narrative       Family History   Problem Relation Age of Onset    Heart Disease Mother     Heart Disease Father        Past Surgical History:   Procedure Laterality Date    HX APPENDECTOMY      HX BREAST LUMPECTOMY      Left.  HX CATARACT REMOVAL  5/1/2013, 5/8/2013    left and right    HX CORONARY STENT PLACEMENT  2/22/10    prox LAD w/ 2.75-mm Cypher stent.     HX HEART CATHETERIZATION      HX TONSILLECTOMY         Current Outpatient Prescriptions   Medication Sig Dispense Refill    metoprolol succinate (TOPROL-XL) 50 mg XL tablet TAKE ONE TABLET BY MOUTH DAILY 90 Tab 1    tolterodine ER (DETROL LA) 4 mg ER capsule 1 tablet daily 90 Cap 1    escitalopram oxalate (LEXAPRO) 5 mg tablet TAKE 1/2 TO 1 TABLET DAILY 90 Tab 1    clopidogrel (PLAVIX) 75 mg tab TAKE ONE TABLET BY MOUTH DAILY 90 Tab 0    losartan (COZAAR) 50 mg tablet TAKE ONE TABLET BY MOUTH TWICE A  Tab 3    atorvastatin (LIPITOR) 40 mg tablet TAKE ONE TABLET BY MOUTH DAILY 90 Tab 1    ferrous sulfate (IRON) 325 mg (65 mg iron) tablet Take 1 Tab by mouth Daily (before breakfast). 30 Tab 2    bromfenac (BROMDAY) 0.09 % ophthalmic solution       meclizine (ANTIVERT) 12.5 mg tablet Take 1 Tab by mouth two (2) times daily as needed. 15 Tab 0    omeprazole (PRILOSEC) 40 mg capsule 1 capsule each AM 30 Cap 2    coenzyme q10-vitamin e (COQ10 ) 100-100 mg-unit cap Take  by mouth.  cyanocobalamin (VITAMIN B-12) 1,000 mcg tablet Take 1,000 mcg by mouth daily.  cholecalciferol, vitamin d3, (VITAMIN D) 1,000 unit tablet Take 1,000 Units by mouth daily.  calcium carbonate-vitamin d2 1,200-400 mg-unit Cap Take 1 Cap by mouth daily.  aspirin 81 mg tablet Take 81 mg by mouth daily. EKG: normal EKG, normal sinus rhythm, unchanged from previous tracings, nonspecific ST and T waves changes, incomplete RBBB  . ASSESSMENT and PLAN  Encounter Diagnoses   Name Primary?  Coronary artery disease involving native coronary artery of native heart without angina pectoris,acute anterior ST-elevation myocardial infarction/primary stenting of the LAD on 02/22/10/EF 45%. Yes    Essential hypertension, benign     Dizziness     Left carotid bruit     Retinal emboli, right    She has been doing extremely well. She looks good for her age of 80. She has had no symptoms to indicate angina or cardiac decompensation. She has been on dual antiplatelet agent that includes Plavix which will be continued particularly in light of her left carotid bruit and also history of retinal emboli.

## 2018-07-31 NOTE — MR AVS SNAPSHOT
[address] [place] [address] Jacob Norris [address] 
[phone] Patient: Maia Davis MRN: [id number] TFS:30/81/3825 Visit Information Date & Time Provider Department Dept. Phone Encounter #  
 7/31/2018 11:20 AM Jovita Whitfield MD [facility] [address] [id number] Upcoming Health Maintenance Date Due DTaP/Tdap/Td series (1 - Tdap) 12/22/1944 ZOSTER VACCINE AGE 60> 10/22/1983 GLAUCOMA SCREENING Q2Y 12/22/1988 Bone Densitometry (Dexa) Screening 12/22/1988 MEDICARE YEARLY EXAM 3/14/2018 Influenza Age 5 to Adult 8/1/2018 Allergies as of 7/31/2018  Review Complete On: 7/11/2018 By: Kamala Randolph, PT Severity Noted Reaction Type Reactions Viibryd [Vilazodone] High   Anaphylaxis, Vertigo Coreg [Carvedilol]  10/12/2011    Other (comments) Pt states coreg makes her feel like a \"zombie\" Erythromycin  02/17/2011    Nausea Only Severe. Penicillins  02/17/2011    Hives Current Immunizations  Reviewed on 10/18/2016 Name Date Influenza High Dose Vaccine PF 10/17/2017, 10/18/2016 Influenza Vaccine (Quad) PF 10/22/2015 Pneumococcal Polysaccharide (PPSV-23) 10/5/2010 Pneumococcal Vaccine (Unspecified Type) 10/17/1996 Not reviewed this visit You Were Diagnosed With   
  
 Codes Comments Coronary artery disease involving native coronary artery of native heart without angina pectoris    -  Primary ICD-10-CM: I25.10 ICD-9-CM: 414.01 Essential hypertension, benign     ICD-10-CM: I10 
ICD-9-CM: 401.1 Dizziness     ICD-10-CM: W04 ICD-9-CM: 780.4 Left carotid bruit     ICD-10-CM: R09.89 ICD-9-CM: 844. 9 Retinal emboli, right     ICD-10-CM: H34.9 ICD-9-CM: 362.30 Vitals BP Pulse Height(growth percentile) Weight(growth percentile) SpO2 BMI  
 160/66 (!) 59 5' (1.524 m) 113 lb (51.3 kg) 98% 22.07 kg/m2 OB Status Smoking Status Postmenopausal Never Smoker Vitals History BMI and BSA Data Body Mass Index Body Surface Area 22.07 kg/m 2 1.47 m 2 Preferred Pharmacy Pharmacy Name Phone [facility] [address] [facility] [phone] Your Updated Medication List  
  
   
This list is accurate as of 7/31/18 12:13 PM.  Always use your most recent med list.  
  
  
  
  
 aspirin 81 mg tablet Take 81 mg by mouth daily. atorvastatin 40 mg tablet Commonly known as:  LIPITOR  
TAKE ONE TABLET BY MOUTH DAILY  
  
 bromfenac 0.09 % ophthalmic solution Commonly known as:  BROMDAY  
  
 calcium carbonate-vitamin d2 1,200-400 mg-unit Cap Take 1 Cap by mouth daily. clopidogrel 75 mg Tab Commonly known as:  PLAVIX TAKE ONE TABLET BY MOUTH DAILY  
  
 COQ10  100-100 mg-unit Cap Generic drug:  coenzyme q10-vitamin e Take  by mouth.  
  
 escitalopram oxalate 5 mg tablet Commonly known as:  Wilene Heads TAKE 1/2 TO 1 TABLET DAILY ferrous sulfate 325 mg (65 mg iron) tablet Commonly known as:  Iron Take 1 Tab by mouth Daily (before breakfast). losartan 50 mg tablet Commonly known as:  COZAAR  
TAKE ONE TABLET BY MOUTH TWICE A DAY  
  
 meclizine 12.5 mg tablet Commonly known as:  ANTIVERT Take 1 Tab by mouth two (2) times daily as needed. metoprolol succinate 50 mg XL tablet Commonly known as:  TOPROL-XL  
TAKE ONE TABLET BY MOUTH DAILY  
  
 omeprazole 40 mg capsule Commonly known as:  PRILOSEC  
1 capsule each AM  
  
 tolterodine ER 4 mg ER capsule Commonly known as:  DETROL LA  
1 tablet daily VITAMIN B-12 1,000 mcg tablet Generic drug:  cyanocobalamin Take 1,000 mcg by mouth daily. VITAMIN D3 1,000 unit tablet Generic drug:  cholecalciferol Take 1,000 Units by mouth daily. We Performed the Following AMB POC EKG ROUTINE W/ 12 LEADS, INTER & REP [81112 CPT(R)] To-Do List   
 08/01/2018 4:30 PM  
  Appointment with Leah Perkins, PT at [address] [place] [address] ([phone]) 08/06/2018 3:30 PM  
  Appointment with lAex Maki, PT at [address] [place] [address] ([phone]) 08/08/2018 12:00 PM  
  Appointment with Leah Perkins, PT at [address] [place] [address] ([phone]) Please provide this summary of care documentation to your next provider. Your primary care clinician is listed as FREDDY DIAZ. If you have any questions after today's visit, please call [phone].

## 2018-08-01 ENCOUNTER — HOSPITAL ENCOUNTER (OUTPATIENT)
Dept: PHYSICAL THERAPY | Age: 83
Discharge: HOME | End: 2018-08-01
Payer: MEDICARE

## 2018-08-01 PROCEDURE — 97110 THERAPEUTIC EXERCISES: CPT

## 2018-08-01 PROCEDURE — 97112 NEUROMUSCULAR REEDUCATION: CPT

## 2018-08-01 NOTE — PROGRESS NOTES
PT DAILY TREATMENT NOTE - [facility]  Patient Name: Maia Davis Date:2018 : 1923 [x]  Patient  Verified Payor: [place] MED[place] / Plan: [facility] / Product Type: Medicare / In time:4:30  Out time:5:00 Total Treatment Time (min): 30 Total Timed Codes (min): 30 
1:1 Treatment Time (MC only): 30 Visit #: 8 of 10 Treatment Area: Unsteadiness on feet [R26.81] SUBJECTIVE Pain Level (0-10 scale): 0/10 Any medication changes, allergies to medications, adverse drug reactions, diagnosis change, or new procedure performed?: [x] No    [] Yes (see summary sheet for update) Subjective functional status/changes:   [] No changes reported \"I do okay around the house but I still avoid curbs and steps as much as possible. I use a walking stick sometimes for curbs. My legs just don't feel strong enough yet. \" OBJECTIVE 15 min Therapeutic Activity:  []  See flow sheet :  
Rationale: increase strength  to improve the patients ability to perform stairs, curbs with increased confidence 15 min Neuromuscular Re-education:  [x]  See flow sheet :  
Rationale: increase strength, improve coordination, improve balance and increase proprioception  to improve the patients ability to improve stability with amb on various surfaces With 
 [] TE 
 [] TA 
 [] neuro 
 [] other: Patient Education: [x] Review HEP [] Progressed/Changed HEP based on:  
[] positioning   [] body mechanics   [] transfers   [] heat/ice application   
[] other:   
 
Other Objective/Functional Measures: Updated Rx program to include more LE strengthening exercises as Pt reported decreased strength but improved balance. Pain Level (0-10 scale) post treatment: 0/10 ASSESSMENT/Changes in Function: Pt reported she felt better about exercises today and could tell it was challenging her strength more.   Will continue to progress and add wts to increase left LE strength especially to assist with ability to perform stairs and curbs. Patient will continue to benefit from skilled PT services to modify and progress therapeutic interventions, address functional mobility deficits, address ROM deficits, address strength deficits, analyze and address soft tissue restrictions, analyze and cue movement patterns, analyze and modify body mechanics/ergonomics, assess and modify postural abnormalities, address imbalance/dizziness and instruct in home and community integration to attain remaining goals. []  See Plan of Care 
[]  See progress note/recertification 
[]  See Discharge Summary Progress towards goals / Updated goals: 
Short Term Goals: To be accomplished in 1 weeks: 
Goal: Pt to be compliant with initial HEP to increase stability with standing/amb. Status at last note/certification: Established and reviewed with Pt Current status:  Met - reports compliance Long Term Goals: To be accomplished in 5 weeks: 
Goal: Pt to increase DGI score to 24/24 to show improved dynamic standing balance and increase safety with community level amb. Status at last note/certification: DGI 27/55 Current status: Progressing, DGI 21/24 Goal: Pt to amb community distances without gait deviations or need for A.D for increased independence. Status at last note/certification: amb using walking stick intermittently; veers right to left Current status: progressing - slowed yadira, slight veering from right to left without A.D. Goal: Pt to report at least 50% increase in confidence with gait for improved activity tolerance. Status at last note/certification: N/A Current status: Progressing, continues to be unsure of herself Goal: Pt to report FOTO score of 68 pts to show improved function. Status at last note/certification: FOTO 63 pts Current status: Not met, FOTO 59 pts PLAN [x]  Upgrade activities as tolerated     [x]  Continue plan of care 
[]  Update interventions per flow sheet      
[]  Discharge due to:_ 
[] Other:_   
 
Vargas Cordero, SPT Geovanni Gallo, PT 8/1/2018  1:53 PM 
 
Future Appointments Date Time Provider Augusta Coffman 8/6/2018 3:30 PM Arsh Mitchell, PT [facility]  
8/8/2018 12:00 PM Geovanni Gallo, PT [facility]  
1/29/2019 1:20 PM Syed Garces MD [address] [place] [address]

## 2018-08-06 ENCOUNTER — HOSPITAL ENCOUNTER (OUTPATIENT)
Dept: PHYSICAL THERAPY | Age: 83
Discharge: HOME | End: 2018-08-06
Payer: MEDICARE

## 2018-08-06 PROCEDURE — 97112 NEUROMUSCULAR REEDUCATION: CPT

## 2018-08-06 NOTE — PROGRESS NOTES
PT DAILY TREATMENT NOTE - [facility]     Patient Name: Shasta Giraldo  DXDC:3/6/[birth year]  : 1923  [x]  Patient  Verified  Payor: [place] MEDICARE / Plan: [place] MEDICARE PART A & B / Product Type: Medicare /    In time:330  Out time:402  Total Treatment Time (min): 32  Total Timed Codes (min): 32  1:1 Treatment Time ( only): 32   Visit #: 9 of 10    Treatment Area: Unsteadiness on feet [R26.81]    SUBJECTIVE  Pain Level (0-10 scale): 0  Any medication changes, allergies to medications, adverse drug reactions, diagnosis change, or new procedure performed?: [x] No    [] Yes (see summary sheet for update)  Subjective functional status/changes:   [] No changes reported  I'm alright.      OBJECTIVE    Modality rationale:    Min Type Additional Details    [] Estim:  []Unatt       []IFC  []Premod                        []Other:  []w/ice   []w/heat  Position:  Location:    [] Estim: []Att    []TENS instruct  []NMES                    []Other:  []w/US   []w/ice   []w/heat  Position:  Location:    []  Traction: [] Cervical       []Lumbar                       [] Prone          []Supine                       []Intermittent   []Continuous Lbs:  [] before manual  [] after manual    []  Ultrasound: []Continuous   [] Pulsed                           []1MHz   []3MHz W/cm2:  Location:    []  Iontophoresis with dexamethasone         Location: [] Take home patch   [] In clinic    []  Ice     []  heat  []  Ice massage  []  Laser   []  Anodyne Position:  Location:    []  Laser with stim  []  Other:  Position:  Location:    []  Vasopneumatic Device Pressure:       [] lo [] med [] hi   Temperature: [] lo [] med [] hi   [] Skin assessment post-treatment:  []intact []redness- no adverse reaction    []redness  adverse reaction:     32 min Neuromuscular Re-education:  [x]  See flow sheet :   Rationale: increase strength, improve coordination, improve balance and increase proprioception  to improve the patients ability to improve stability with ambulation, reduce falls risk    With   [] TE   [] TA   [] neuro   [] other: Patient Education: [x] Review HEP    [] Progressed/Changed HEP based on:   [] positioning   [] body mechanics   [] transfers   [] heat/ice application    [] other:      Other Objective/Functional Measures: completed exercises per flow sheet     Pain Level (0-10 scale) post treatment: 0    ASSESSMENT/Changes in Function: When asked about discharge or continuation after next visit, Patient states that she would like to continue in order to improve LE strength. Good static standing balance on foam without UE support. Patient reports improvement of at least half. Patient will continue to benefit from skilled PT services to modify and progress therapeutic interventions, address functional mobility deficits, address ROM deficits, address strength deficits, analyze and address soft tissue restrictions, analyze and cue movement patterns, analyze and modify body mechanics/ergonomics, assess and modify postural abnormalities, address imbalance/dizziness and instruct in home and community integration to attain remaining goals. []  See Plan of Care  []  See progress note/recertification  []  See Discharge Summary         Progress towards goals / Updated goals:  Short Term Goals: To be accomplished in 1 weeks:  Goal: Pt to be compliant with initial HEP to increase stability with standing/amb. Status at last note/certification: Established and reviewed with Pt  Current status:  Met - reports compliance  Long Term Goals: To be accomplished in 5 weeks:  Goal: Pt to increase DGI score to 24/24 to show improved dynamic standing balance and increase safety with community level amb. Status at last note/certification: DGI 70/39  Current status: Progressing, DGI 21/24   Goal: Pt to amb community distances without gait deviations or need for A.D for increased independence.   Status at last note/certification: amb using walking stick intermittently; veers right to left  Current status: progressing - slowed yadira, slight veering from right to left without A.D. Goal: Pt to report at least 50% increase in confidence with gait for improved activity tolerance. Status at last note/certification: N/A  Current status: Met, reports \"MCFP\"  Goal: Pt to report FOTO score of 68 pts to show improved function.   Status at last note/certification: FOTO 63 pts  Current status: Not met, FOTO 59 pts    PLAN  [x]  Upgrade activities as tolerated     [x]  Continue plan of care  []  Update interventions per flow sheet       []  Discharge due to:_  []  Other:_      Francine Sinha, PT 8/6/2018  12:09 PM    Future Appointments  Date Time Provider Augusta Coffman   8/6/2018 3:30 PM Francine Sinha [facility]   8/8/2018 12:00 PM Edwige Gallo, [facility]   1/29/2019 1:20 PM Latesha Ansari MD [address] [place] [address]

## 2018-08-08 ENCOUNTER — HOSPITAL ENCOUNTER (OUTPATIENT)
Dept: PHYSICAL THERAPY | Age: 83
Discharge: HOME | End: 2018-08-08
Payer: MEDICARE

## 2018-08-08 PROCEDURE — 97112 NEUROMUSCULAR REEDUCATION: CPT

## 2018-08-08 PROCEDURE — 97110 THERAPEUTIC EXERCISES: CPT

## 2018-08-08 NOTE — PROGRESS NOTES
[facility], [address] [place] [address]  [phone] fax    Continued Plan of Care/ Re-certification for Physical Therapy Services    Patient name: Case Capellan Start of Care: 2018   Referral source: Denny Brody MD : 1923                         Medical Diagnosis: Unsteadiness on feet [R26.81] Onset Date:18                         Treatment Diagnosis: Gait Disturbance   Prior Hospitalization: see medical history Provider#: [id number]   Medications: Verified on Patient summary List    Comorbidities: hx of MI; Bladder problems   Prior Level of Function: Lives in 1-story home with friend; functionally independent    Visits from Start of Care: 10    Missed Visits: 0    The Plan of Care and following information is based on the patient's current status:    Short Term Goals: To be accomplished in 1 weeks:  Goal: Pt to be compliant with initial HEP to increase stability with standing/amb. Status at last note/certification: Established and reviewed with Pt  Current status:  Met - Pt reports compliance  Long Term Goals: To be accomplished in 5 weeks:  Goal: Pt to increase DGI score to 24/24 to show improved dynamic standing balance and increase safety with community level amb. Status at last note/certification: DGI 73/06  Current status: Progressing - DGI 22/24   Goal: Pt to amb community distances without gait deviations or need for A.D for increased independence. Status at last note/certification: amb using walking stick intermittently; veers right to left  Current status: progressing - slowed yadira, slight veering from right to left without A.D. Goal: Pt to report at least 50% increase in confidence with gait for improved activity tolerance. Status at last note/certification: N/A  Current status: Met - reports \"[facility]\"  Goal: Pt to report FOTO score of 68 pts to show improved function.   Status at last note/certification: FOTO 63 pts  Current status: Not met - FOTO 63 pts    Key functional changes: Pt reports improved standing balance since start of therapy but continues to feel weak in B LEs, left > right. Pt continues to avoid stairs and curbs within the community due to apprehension of falling as she doesn't feel strong enough to perform without assistance. Pt occasionally uses a walking stick in the community for safety. Problems/ barriers to goal attainment: None     Problem List: decrease strength, impaired gait/ balance, decrease ADL/ functional abilitiies, decrease activity tolerance and decrease transfer abilities    Treatment Plan: Therapeutic exercise, Therapeutic activities, Neuromuscular re-education, Physical agent/modality, Gait/balance training, Manual therapy, Patient education and Stair training     Patient Goal (s) has been updated and includes: \"Improve my leg strength and be able to take stairs and curbs better. \"     Goals for this certification period to be accomplished in 4 weeks:  Goal: Pt to increase DGI score to 24/24 to show improved dynamic standing balance and increase safety with community level amb. Status at last note/certification: DGI 36/06  Current status:   Goal: Pt to amb community distances without gait deviations or need for A.D for increased independence. Status at last note/certification: slowed yadira, slight veering from right to left without A.D. Current status:   Goal: Pt to report at least 75% increase in confidence with gait to take stairs and curbs with ease in community. Status at last note/certification: \"skilled nursing\"  Current status:   Goal: Pt to report FOTO score of 68 pts to show improved function.   Status at last note/certification: FOTO 63 pts  Current status:     Frequency / Duration: Patient to be seen 2 times per week for 4 weeks:    Assessment / Recommendations:Pt would benefit from continued skilled PT to address continued B LE strength and improve ability to step onto and off of stairs and curbs with increased control and confidence for ease with navigating throughout the community. G-Codes (GP)  Mobility  [id number] Current  CJ= 20-39%  [id number] Goal  CJ= 20-39%    The severity rating is based on clinical judgment and the FOTO score. Certification Period: 8/9/18 - 9/7/18    Anila Gallo, PT 8/8/2018 9:19 AM    ________________________________________________________________________  I certify that the above Therapy Services are being furnished while the patient is under my care. I agree with the treatment plan and certify that this therapy is necessary. [] I have read the above and request that my patient continue as recommended.   [] I have read the above report and request that my patient continue therapy with the following changes/special instructions: ______________________________________  [] I have read the above report and request that my patient be discharged from therapy    Physician's Signature:____________Date:_________TIME:________    ** Signature, Date and Time must be completed for valid certification **    Please sign and return to [facility] [address]  [phone] fax

## 2018-08-08 NOTE — PROGRESS NOTES
PT DAILY TREATMENT NOTE - [facility]     Patient Name: Cosmo Ramirez  XRYE:[id number]  : 1923  [x]  Patient  Verified  Payor: [place] MEDICARE / Plan: [place] MEDICARE PART A & B / Product Type: Medicare /    In time:12:05  Out time:12:45  Total Treatment Time (min): 40  Total Timed Codes (min): 40  1:1 Treatment Time (MC only): 40   Visit #: 10 of 10    Treatment Area: Unsteadiness on feet [R26.81]    SUBJECTIVE  Pain Level (0-10 scale): 0/10  Any medication changes, allergies to medications, adverse drug reactions, diagnosis change, or new procedure performed?: [x] No    [] Yes (see summary sheet for update)  Subjective functional status/changes:   [] No changes reported  \"I feel okay. \"    OBJECTIVE    25 min Therapeutic Exercise:  [] See flow sheet :   Rationale: increase strength to improve the patients ability to negotiate stairs, curbs with ease    15 min Neuromuscular Re-education:  []  See flow sheet :   Rationale: increase strength, improve balance and increase proprioception  to improve the patients ability to improve standing dynamic balance for improved gait, stair negotiation          With   [] TE   [] TA   [] neuro   [] other: Patient Education: [x] Review HEP    [] Progressed/Changed HEP based on:   [] positioning   [] body mechanics   [] transfers   [] heat/ice application    [] other:      Other Objective/Functional Measures: Continued with progressive strengthening exercises per flow sheet. Pain Level (0-10 scale) post treatment: 0/10    ASSESSMENT/Changes in Function: Pt scored 22/24 on DGI, showing further improvement in standing balance. Pt will continue with skilled PT to improve LE strength to increase ease with stair negotiation and curbs within community.     Patient will continue to benefit from skilled PT services to address functional mobility deficits, address ROM deficits, address strength deficits, analyze and address soft tissue restrictions, analyze and cue movement patterns, analyze and modify body mechanics/ergonomics, assess and modify postural abnormalities, address imbalance/dizziness and instruct in home and community integration to attain remaining goals. []  See Plan of Care  [x]  See progress note/recertification  []  See Discharge Summary         Progress towards goals / Updated goals:  Short Term Goals: To be accomplished in 1 weeks:  Goal: Pt to be compliant with initial HEP to increase stability with standing/amb. Status at last note/certification: Established and reviewed with Pt  Current status:  Met - reports compliance  Long Term Goals: To be accomplished in 5 weeks:  Goal: Pt to increase DGI score to 24/24 to show improved dynamic standing balance and increase safety with community level amb. Status at last note/certification: DGI 84/22  Current status: Progressing, DGI 22/24   Goal: Pt to amb community distances without gait deviations or need for A.D for increased independence. Status at last note/certification: amb using walking stick intermittently; veers right to left  Current status: progressing - slowed yadira, slight veering from right to left without A.D. Goal: Pt to report at least 50% increase in confidence with gait for improved activity tolerance. Status at last note/certification: N/A  Current status: Met, reports \"senior care\"  Goal: Pt to report FOTO score of 68 pts to show improved function.   Status at last note/certification: FOTO 63 pts  Current status: Not met, FOTO 63 pts    PLAN  []  Upgrade activities as tolerated     []  Continue plan of care  []  Update interventions per flow sheet       []  Discharge due to:_  []  Other:_      Gladys Gallo, PT 8/8/2018  12:07 PM    Future Appointments  Date Time Provider Augusta Coffman   1/29/2019 1:20 PM Rossy Love MD [address] [place] [address]

## 2018-08-13 ENCOUNTER — HOSPITAL ENCOUNTER (OUTPATIENT)
Dept: PHYSICAL THERAPY | Age: 83
Discharge: HOME | End: 2018-08-13
Payer: MEDICARE

## 2018-08-13 PROCEDURE — 97112 NEUROMUSCULAR REEDUCATION: CPT

## 2018-08-13 PROCEDURE — 97110 THERAPEUTIC EXERCISES: CPT

## 2018-08-13 NOTE — PROGRESS NOTES
PT DAILY TREATMENT NOTE - [facility]     Patient Name: Robin Jeffers  MCDO:[id number]  : 1923  [x]  Patient  Verified  Payor: [place] MEDICARE / Plan: VA MEDICARE PART A & B / Product Type: Medicare /    In time:4:00  Out time:4:35  Total Treatment Time (min): 35  Total Timed Codes (min): 35  1:1 Treatment Time ( only): 35   Visit #: 1 of 8    Treatment Area: Unsteadiness on feet [R26.81]    SUBJECTIVE  Pain Level (0-10 scale): 0/10  Any medication changes, allergies to medications, adverse drug reactions, diagnosis change, or new procedure performed?: [x] No    [] Yes (see summary sheet for update)  Subjective functional status/changes:   [] No changes reported  \"I still find that I have to reach for a car in the parking lot to hold onto to get up the curbs. \"    OBJECTIVE    20 min Therapeutic Exercise:  [] See flow sheet :   Rationale: increase strength to improve the patients ability to negotiate stairs, curbs with ease    15 min Neuromuscular Re-education:  []  See flow sheet :   Rationale: increase strength, improve balance and increase proprioception  to improve the patients ability to improve standing dynamic balance for improved gait, stair negotiation          With   [] TE   [] TA   [] neuro   [] other: Patient Education: [x] Review HEP    [] Progressed/Changed HEP based on:   [] positioning   [] body mechanics   [] transfers   [] heat/ice application    [] other:      Other Objective/Functional Measures: Added 1# wt for further strengthening of LEs. Resumed damion tomas for stability challenge. Progressing away from UE support for all balance activities. Pain Level (0-10 scale) post treatment: 0/10    ASSESSMENT/Changes in Function: Pt still challenged with curbs in community. Progressive strengthening program geared towards increasing LE strength to be able to perform with less difficulty. Pt reported understanding.     Patient will continue to benefit from skilled PT services to address functional mobility deficits, address ROM deficits, address strength deficits, analyze and address soft tissue restrictions, analyze and cue movement patterns, analyze and modify body mechanics/ergonomics, assess and modify postural abnormalities, address imbalance/dizziness and instruct in home and community integration to attain remaining goals. []  See Plan of Care  [x]  See progress note/recertification  []  See Discharge Summary         Progress towards goals / Updated goals:  Short Term Goals: To be accomplished in 1 weeks:  Goal: Pt to be compliant with initial HEP to increase stability with standing/amb. Status at last note/certification: Established and reviewed with Pt  Current status:  Met - reports compliance  Long Term Goals: To be accomplished in 5 weeks:  Goal: Pt to increase DGI score to 24/24 to show improved dynamic standing balance and increase safety with community level amb. Status at last note/certification: DGI 88/63  Current status: Progressing, DGI 22/24   Goal: Pt to amb community distances without gait deviations or need for A.D for increased independence. Status at last note/certification: amb using walking stick intermittently; veers right to left  Current status: progressing - slowed yadira, slight veering from right to left without A.D. Goal: Pt to report at least 50% increase in confidence with gait for improved activity tolerance. Status at last note/certification: N/A  Current status: Met, reports \"snf\"  Goal: Pt to report FOTO score of 68 pts to show improved function.   Status at last note/certification: FOTO 63 pts  Current status: Not met, FOTO 63 pts    PLAN  []  Upgrade activities as tolerated     []  Continue plan of care  []  Update interventions per flow sheet       []  Discharge due to:_  []  Other:_      Elvis Gallo, PT 8/13/2018  12:07 PM    Future Appointments  Date Time Provider Augusta Coffman   8/15/2018 3:30 PM Radha Sosa, PT [facility]   8/20/2018 3:30 PM Tremaine Gallo, PT [facility]   8/22/2018 3:00 PM Tremaine Gallo, PT [facility]   8/27/2018 2:30 PM Anatoliy Valdivia, PT [facility]   8/29/2018 2:30 PM Tremaine Gallo, PT [facility]   1/29/2019 1:20 PM Gilberto Mckeon MD [address] [place] [address]

## 2018-08-15 ENCOUNTER — HOSPITAL ENCOUNTER (OUTPATIENT)
Dept: PHYSICAL THERAPY | Age: 83
Discharge: HOME | End: 2018-08-15
Payer: MEDICARE

## 2018-08-15 PROCEDURE — 97110 THERAPEUTIC EXERCISES: CPT

## 2018-08-15 PROCEDURE — 97112 NEUROMUSCULAR REEDUCATION: CPT

## 2018-08-15 NOTE — PROGRESS NOTES
PT DAILY TREATMENT NOTE - [facility]     Patient Name: Ike Richards  ZAAK:  : 1923  [x]  Patient  Verified  Payor: VA MEDICARE / Plan: VA MEDICARE PART A & B / Product Type: Medicare /    In time:333  Out time:402  Total Treatment Time (min): 29  Total Timed Codes (min): 29  1:1 Treatment Time ( only): 29   Visit #: 2 of 8    Treatment Area: Unsteadiness on feet [R26.81]    SUBJECTIVE  Pain Level (0-10 scale): 0  Any medication changes, allergies to medications, adverse drug reactions, diagnosis change, or new procedure performed?: [x] No    [] Yes (see summary sheet for update)  Subjective functional status/changes:   [] No changes reported  I don't know. I'm always tired.      OBJECTIVE    Modality rationale:    Min Type Additional Details    [] Estim:  []Unatt       []IFC  []Premod                        []Other:  []w/ice   []w/heat  Position:  Location:    [] Estim: []Att    []TENS instruct  []NMES                    []Other:  []w/US   []w/ice   []w/heat  Position:  Location:    []  Traction: [] Cervical       []Lumbar                       [] Prone          []Supine                       []Intermittent   []Continuous Lbs:  [] before manual  [] after manual    []  Ultrasound: []Continuous   [] Pulsed                           []1MHz   []3MHz W/cm2:  Location:    []  Iontophoresis with dexamethasone         Location: [] Take home patch   [] In clinic    []  Ice     []  heat  []  Ice massage  []  Laser   []  Anodyne Position:  Location:    []  Laser with stim  []  Other:  Position:  Location:    []  Vasopneumatic Device Pressure:       [] lo [] med [] hi   Temperature: [] lo [] med [] hi   [] Skin assessment post-treatment:  []intact []redness- no adverse reaction    []redness  adverse reaction:     15 min Therapeutic Exercise:  [x] See flow sheet :   Rationale: increase strength to improve the patients ability to improve ease of stair/curb negotiation    14 min Neuromuscular Re-education:  [x]  See flow sheet :   Rationale: increase strength, improve coordination, improve balance and increase proprioception  to improve the patients ability to improve steadiness of dynamic tasks     With   [] TE   [] TA   [] neuro   [] other: Patient Education: [x] Review HEP    [] Progressed/Changed HEP based on:   [] positioning   [] body mechanics   [] transfers   [] heat/ice application    [] other:      Other Objective/Functional Measures: completed exercises per flow sheet     Pain Level (0-10 scale) post treatment: 0    ASSESSMENT/Changes in Function: Patient challenged with knee strength/stability with eccentric step downs. Increased sway with static standing balance on foam with eyes closed. Patient will continue to benefit from skilled PT services to modify and progress therapeutic interventions, address functional mobility deficits, address ROM deficits, address strength deficits, analyze and address soft tissue restrictions, analyze and cue movement patterns, analyze and modify body mechanics/ergonomics, assess and modify postural abnormalities, address imbalance/dizziness and instruct in home and community integration to attain remaining goals. []  See Plan of Care  []  See progress note/recertification  []  See Discharge Summary         Progress towards goals / Updated goals:  Goal: Pt to increase DGI score to 24/24 to show improved dynamic standing balance and increase safety with community level amb. Status at last note/certification: DGI 28/95  Current status: Reassess at visit 4/8   Goal: Pt to amb community distances without gait deviations or need for A.D for increased independence. Status at last note/certification: slowed yadira, slight veering from right to left without A.D.   Current status: Progressing, remains slow, some deviations from straight path  Goal: Pt to report at least 75% increase in confidence with gait to take stairs and curbs with ease in community. Status at last note/certification: \"care home\"  Current status: Progressing, slow gains  Goal: Pt to report FOTO score of 68 pts to show improved function.   Status at last note/certification: FOTO 63 pts  Current status: Reassess at visit 4/8     PLAN  [x]  Upgrade activities as tolerated     [x]  Continue plan of care  []  Update interventions per flow sheet       []  Discharge due to:_  []  Other:_      Mariza Anderson, PT 8/15/2018  2:27 PM    Future Appointments  Date Time Provider Augusta Coffman   8/15/2018 3:30 PM Mariza Anderson, [facility]   8/20/2018 3:30 PM Veda Gallo, [facility]   8/22/2018 3:00 PM Veda Gallo, [facility]   8/27/2018 2:30 PM Mariza Anderson, [facility]   8/29/2018 2:30 PM Veda Gallo, [facility]   1/29/2019 1:20 PM Gen Harris MD [address] [place] [address]

## 2018-08-20 ENCOUNTER — HOSPITAL ENCOUNTER (OUTPATIENT)
Dept: PHYSICAL THERAPY | Age: 83
Discharge: HOME | End: 2018-08-20
Payer: MEDICARE

## 2018-08-20 PROCEDURE — 97110 THERAPEUTIC EXERCISES: CPT

## 2018-08-20 PROCEDURE — 97112 NEUROMUSCULAR REEDUCATION: CPT

## 2018-08-20 NOTE — PROGRESS NOTES
PT DAILY TREATMENT NOTE - [facility]     Patient Name: Judge Brittle  BNMQ:[id number]  : 1923  [x]  Patient  Verified  Payor: Desiree Sandoval / Plan: [place] MEDICARE PART A & B / Product Type: Medicare /    In time:3:28  Out time:4:00  Total Treatment Time (min): 32  Total Timed Codes (min): 32  1:1 Treatment Time ( only): 32   Visit #: 3 of 8    Treatment Area: Unsteadiness on feet [R26.81]    SUBJECTIVE  Pain Level (0-10 scale): 0/10  Any medication changes, allergies to medications, adverse drug reactions, diagnosis change, or new procedure performed?: [x] No    [] Yes (see summary sheet for update)  Subjective functional status/changes:   [] No changes reported  \"I feel as good as I want to feel, which is why I am here. \"    OBJECTIVE    17 min Therapeutic Exercise:  [x] See flow sheet :   Rationale: increase strength to improve the patients ability to improve ease of stair/curb negotiation    15 min Neuromuscular Re-education:  [x]  See flow sheet :   Rationale: increase strength, improve coordination, improve balance and increase proprioception  to improve the patients ability to improve steadiness of dynamic tasks     With   [] TE   [] TA   [] neuro   [] other: Patient Education: [x] Review HEP    [] Progressed/Changed HEP based on:   [] positioning   [] body mechanics   [] transfers   [] heat/ice application    [] other:      Other Objective/Functional Measures: Increased reps per flow sheet. Pt noted mild fatigue after session. Pain Level (0-10 scale) post treatment: 0/10    ASSESSMENT/Changes in Function: Pt challenged with step downs but able to complete. Pt able to perform eyes closed Romberg without LOB and only minor sway today.      Patient will continue to benefit from skilled PT services to modify and progress therapeutic interventions, address functional mobility deficits, address ROM deficits, address strength deficits, analyze and address soft tissue restrictions, analyze and cue movement patterns, analyze and modify body mechanics/ergonomics, assess and modify postural abnormalities, address imbalance/dizziness and instruct in home and community integration to attain remaining goals. []  See Plan of Care  []  See progress note/recertification  []  See Discharge Summary         Progress towards goals / Updated goals:  Goal: Pt to increase DGI score to 24/24 to show improved dynamic standing balance and increase safety with community level amb. Status at last note/certification: DGI 70/10  Current status: Reassess at visit 4/8   Goal: Pt to amb community distances without gait deviations or need for A.D for increased independence. Status at last note/certification: slowed yadira, slight veering from right to left without A.D. Current status: Progressing, remains slow, some deviations from straight path  Goal: Pt to report at least 75% increase in confidence with gait to take stairs and curbs with ease in community. Status at last note/certification: \"alf\"  Current status: Progressing, slow gains  Goal: Pt to report FOTO score of 68 pts to show improved function.   Status at last note/certification: FOTO 63 pts  Current status: Reassess at visit 4/8     PLAN  [x]  Upgrade activities as tolerated     [x]  Continue plan of care  []  Update interventions per flow sheet       []  Discharge due to:_  [x]  Other:_goals next visit      Tai Gallo, PT 8/20/2018  2:27 PM    Future Appointments  Date Time Provider Augusta Coffman   8/22/2018 3:00 PM Tai Gallo, PT [facility]   8/27/2018 2:30 PM Darya Kyle, PT [facility]   8/29/2018 2:30 PM Tai Gallo, [facility]   1/29/2019 1:20 PM Ester Martel MD [address] [place] [address]

## 2018-08-22 ENCOUNTER — HOSPITAL ENCOUNTER (OUTPATIENT)
Dept: PHYSICAL THERAPY | Age: 83
Discharge: HOME | End: 2018-08-22
Payer: MEDICARE

## 2018-08-22 PROCEDURE — 97112 NEUROMUSCULAR REEDUCATION: CPT

## 2018-08-22 NOTE — PROGRESS NOTES
PT DAILY TREATMENT NOTE - [facility]     Patient Name: Tana Ruiz  NGEM:[id number]  : 1923  [x]  Patient  Verified  Payor: Josiah Handy / Plan: [place] MED[place] & B / Product Type: Medicare /    In time:3:05  Out time:3:40  Total Treatment Time (min): 35  Total Timed Codes (min): 35  1:1 Treatment Time ( [address] only): 20   Visit #: 4 of 8    Treatment Area: Unsteadiness on feet [R26.81]    SUBJECTIVE  Pain Level (0-10 scale): 0/10  Any medication changes, allergies to medications, adverse drug reactions, diagnosis change, or new procedure performed?: [x] No    [] Yes (see summary sheet for update)  Subjective functional status/changes:   [] No changes reported  \"I feel good. \"    OBJECTIVE    20 min Therapeutic Exercise:  [x] See flow sheet :   Rationale: increase strength to improve the patients ability to improve ease of stair/curb negotiation    15 min Neuromuscular Re-education:  [x]  See flow sheet :   Rationale: increase strength, improve coordination, improve balance and increase proprioception  to improve the patients ability to improve steadiness of dynamic tasks     With   [] TE   [] TA   [] neuro   [] other: Patient Education: [x] Review HEP    [] Progressed/Changed HEP based on:   [] positioning   [] body mechanics   [] transfers   [] heat/ice application    [] other:      Other Objective/Functional Measures: Continued with Rx program per flow sheet. DGI  (2 pt decrease). FOTO 53 pts (10 pt decrease). Pain Level (0-10 scale) post treatment: 0/10    ASSESSMENT/Changes in Function: Pt exhibited increased difficulty with stepping over cones due to lack of confidence in balancing to step over. Will continue to progress strengthening exercises and balance activities to improve Pt's ability to navigate curbs. Also discussed Pt joining [facility] as a transition to continue strengthening LEs.      Patient will continue to benefit from skilled PT services to modify and progress therapeutic interventions, address functional mobility deficits, address ROM deficits, address strength deficits, analyze and address soft tissue restrictions, analyze and cue movement patterns, analyze and modify body mechanics/ergonomics, assess and modify postural abnormalities, address imbalance/dizziness and instruct in home and community integration to attain remaining goals. []  See Plan of Care  []  See progress note/recertification  []  See Discharge Summary         Progress towards goals / Updated goals:  Goal: Pt to increase DGI score to 24/24 to show improved dynamic standing balance and increase safety with community level amb. Status at last note/certification: DGI 08/88  Current status: not met - DGI 20/24   Goal: Pt to amb community distances without gait deviations or need for A.D for increased independence. Status at last note/certification: slowed yadira, slight veering from right to left without A.D. Current status: Progressing, remains slow, some deviations from straight path  Goal: Pt to report at least 75% increase in confidence with gait to take stairs and curbs with ease in community. Status at last note/certification: \"custodial\"  Current status: Progressing, slow gains  Goal: Pt to report FOTO score of 68 pts to show improved function.   Status at last note/certification: FOTO 63 pts  Current status: not met - FOTO 53 pts    PLAN  [x]  Upgrade activities as tolerated     [x]  Continue plan of care  []  Update interventions per flow sheet       []  Discharge due to:_  [x]  Other:_goals next visit      Cally Gallo, PT 8/22/2018  2:27 PM    Future Appointments  Date Time Provider Augusta Coffman   8/27/2018 2:30 PM Walter Cesar, PT [facility]   8/29/2018 2:30 PM Cally Gallo, PT [facility]   1/29/2019 1:20 PM Elmer Lim MD [address] [place] [address]

## 2018-08-27 ENCOUNTER — HOSPITAL ENCOUNTER (OUTPATIENT)
Dept: PHYSICAL THERAPY | Age: 83
Discharge: HOME | End: 2018-08-27
Payer: MEDICARE

## 2018-08-27 PROCEDURE — 97110 THERAPEUTIC EXERCISES: CPT

## 2018-08-27 PROCEDURE — 97112 NEUROMUSCULAR REEDUCATION: CPT

## 2018-08-27 NOTE — PROGRESS NOTES
PT DAILY TREATMENT NOTE - [facility]     Patient Name: Tana Ruiz  DSVL:  : 1923  [x]  Patient  Verified  Payor: Josiah English / Plan: [place] MEDICARE PART A & B / Product Type: Medicare /    In time:230  Out time:303  Total Treatment Time (min): 33  Total Timed Codes (min): 33  1:1 Treatment Time ( only): 23   Visit #: 5 of 8    Treatment Area: Unsteadiness on feet [R26.81]    SUBJECTIVE  Pain Level (0-10 scale): 0  Any medication changes, allergies to medications, adverse drug reactions, diagnosis change, or new procedure performed?: [x] No    [] Yes (see summary sheet for update)  Subjective functional status/changes:   [] No changes reported  I joined the [facility].      OBJECTIVE    Modality rationale:    Min Type Additional Details    [] Estim:  []Unatt       []IFC  []Premod                        []Other:  []w/ice   []w/heat  Position:  Location:    [] Estim: []Att    []TENS instruct  []NMES                    []Other:  []w/US   []w/ice   []w/heat  Position:  Location:    []  Traction: [] Cervical       []Lumbar                       [] Prone          []Supine                       []Intermittent   []Continuous Lbs:  [] before manual  [] after manual    []  Ultrasound: []Continuous   [] Pulsed                           []1MHz   []3MHz W/cm2:  Location:    []  Iontophoresis with dexamethasone         Location: [] Take home patch   [] In clinic    []  Ice     []  heat  []  Ice massage  []  Laser   []  Anodyne Position:  Location:    []  Laser with stim  []  Other:  Position:  Location:    []  Vasopneumatic Device Pressure:       [] lo [] med [] hi   Temperature: [] lo [] med [] hi   [] Skin assessment post-treatment:  []intact []redness- no adverse reaction    []redness  adverse reaction:     23 min Therapeutic Exercise:  [x] See flow sheet :   Rationale: increase strength to improve the patients ability to improve ease of stair and curb negotiation     10 min Neuromuscular Re-education:  [x] See flow sheet :   Rationale: increase strength, improve coordination, improve balance and increase proprioception  to improve the patients ability to improve steadiness and safety of daily, dynamic tasks    With   [] TE   [] TA   [] neuro   [] other: Patient Education: [x] Review HEP    [] Progressed/Changed HEP based on:   [] positioning   [] body mechanics   [] transfers   [] heat/ice application    [] other:      Other Objective/Functional Measures: increased repetitions per flow sheet     Pain Level (0-10 scale) post treatment: 0    ASSESSMENT/Changes in Function: Challenged with quad stability during Left eccentric step downs. Patient plans to transition to [facility] after next visit. Patient will continue to benefit from skilled PT services to modify and progress therapeutic interventions, address functional mobility deficits, address ROM deficits, address strength deficits, analyze and address soft tissue restrictions, analyze and cue movement patterns, analyze and modify body mechanics/ergonomics, assess and modify postural abnormalities, address imbalance/dizziness and instruct in home and community integration to attain remaining goals. []  See Plan of Care  []  See progress note/recertification  []  See Discharge Summary         Progress towards goals / Updated goals:  Goal: Pt to increase DGI score to 24/24 to show improved dynamic standing balance and increase safety with community level amb. Status at last note/certification: DGI 03/21  Current status: not met - DGI 20/24   Goal: Pt to amb community distances without gait deviations or need for A.D for increased independence. Status at last note/certification: slowed yadira, slight veering from right to left without A.D. Current status: Progressing, remains slow, some deviations from straight path  Goal: Pt to report at least 75% increase in confidence with gait to take stairs and curbs with ease in community.   Status at last note/certification: \"senior living\"  Current status: Progressing, slow gains  Goal: Pt to report FOTO score of 68 pts to show improved function.   Status at last note/certification: FOTO 63 pts  Current status: not met - FOTO 53 pts    PLAN  [x]  Upgrade activities as tolerated     [x]  Continue plan of care  []  Update interventions per flow sheet       []  Discharge due to:_  []  Other:_      Ciera Wong PT 8/27/2018  1:03 PM    Future Appointments  Date Time Provider Augusta Coffman   8/27/2018 2:30 PM Ciera Wong, [facility]   8/29/2018 2:30 PM Emmett Gallo, [facility]   1/29/2019 1:20 PM Crystal Steele MD [address] [place] [address]

## 2018-08-29 ENCOUNTER — HOSPITAL ENCOUNTER (OUTPATIENT)
Dept: PHYSICAL THERAPY | Age: 83
Discharge: HOME | End: 2018-08-29
Payer: MEDICARE

## 2018-08-29 PROCEDURE — 97112 NEUROMUSCULAR REEDUCATION: CPT

## 2018-08-29 PROCEDURE — G8979 MOBILITY GOAL STATUS: HCPCS

## 2018-08-29 PROCEDURE — G8980 MOBILITY D/C STATUS: HCPCS

## 2018-08-29 PROCEDURE — 97110 THERAPEUTIC EXERCISES: CPT

## 2018-08-29 NOTE — PROGRESS NOTES
PT DISCHARGE DAILY NOTE AND [id number] Date:2018 Patient name: Marita Restrepo Start of Care: 2018 Referral source: Kanu Estrada MD : 1923 Medical Diagnosis: Unsteadiness on feet [R26.81] Onset Date:18 Treatment Diagnosis: Gait Disturbance Prior Hospitalization: see medical history Provider#: [id number] Medications: Verified on Patient summary List  
 Comorbidities: hx of MI; Bladder problems Prior Level of Function: Lives in [place] home with friend; functionally independent Visits from Start of Care: 16    Missed Visits: 0 Reporting Period : 18 to 18 [x]  Patient  Verified Payor: [place] MEDICARE / Plan: [address] [place] [address] / Product Type: Medicare / In time:2:35  Out time:3:25 Total Treatment Time (min): 50 Total Timed Codes (min): 50 
1:1 Treatment Time ([facility] only): 50 Visit #: 6 of 8 SUBJECTIVE Pain Level (0-10 scale): 0/10 Any medication changes, allergies to medications, adverse drug reactions, diagnosis change, or new procedure performed?: [x] No    [] Yes (see summary sheet for update) Subjective functional status/changes:   [] No changes reported \"I feel okay. \" OBJECTIVE 35 min Therapeutic Exercise:  [] See flow sheet :  
Rationale: increase strength to improve the patients ability to assist with ease getting up/down stairs, curbs 15 min Neuromuscular Re-education:  []  See flow sheet :  
Rationale: improve coordination and improve balance  to improve the patients ability to amb without LOB or falls With 
 [] TE 
 [] TA 
 [] neuro 
 [] other: Patient Education: [x] Review HEP [] Progressed/Changed HEP based on:  
[] positioning   [] body mechanics   [] transfers   [] heat/ice application   
[] other:   
 
Other Objective/Functional Measures: Continued with Rx program per flow sheet.   Took Pt over to local [facility] and showed different exercises to work on quad and HS strength. Pt return demonstrated 100% understanding. Pain Level (0-10 scale) post treatment: 0/10 Summary of Care: 
Goal: Pt to increase DGI score to 24/24 to show improved dynamic standing balance and increase safety with community level amb. Status at last note/certification: DGI 37/40 Current status: not met - DGI 20/24  
Goal: Pt to amb community distances without gait deviations or need for A.D for increased independence. Status at last note/certification: slowed yadira, slight veering from right to left without A.D. Current status: Progressing, remains slow, some deviations from straight path Goal: Pt to report at least 75% increase in confidence with gait to take stairs and curbs with ease in community. Status at last note/certification: \"assisted\" Current status: Progressing, slow gains Goal: Pt to report FOTO score of 68 pts to show improved function. Status at last note/certification: FOTO 63 pts Current status: not met - FOTO 53 pts ASSESSMENT/Changes in Function: Pt made progress with therapy, exhibiting increased LE strength and standing dynamic balance. Pt continues to have difficulty negotiating stairs and curbs, requiring increased UE support on other cars or walking stick. Pt is compliant with HEP and has joined the local [facility] to continue to self-manage care. Pt is appropriate for D/C at this time. G-Codes (GP) Mobility  Goal  CJ= 20-39%  D/C  CK= 40-59% The severity rating is based on clinical judgment and the FOTO score. Thank you for this referral! 
  
PLAN [x]Discontinue therapy: [x]Patient has reached or is progressing toward set goals []Patient is non-compliant or has abdicated 
    []Due to lack of appreciable progress towards set goals Alfredo Gallo, PT 8/29/2018  2:54 PM

## 2018-10-03 ENCOUNTER — CLINICAL SUPPORT (OUTPATIENT)
Dept: INTERNAL MEDICINE CLINIC | Age: 83
End: 2018-10-03

## 2018-10-03 DIAGNOSIS — Z23 ENCOUNTER FOR IMMUNIZATION: Primary | ICD-10-CM

## 2019-01-08 RX ORDER — ESCITALOPRAM OXALATE 5 MG/1
TABLET ORAL
Qty: 90 TAB | Refills: 0 | Status: SHIPPED | OUTPATIENT
Start: 2019-01-08 | End: 2019-04-05 | Stop reason: SDUPTHER

## 2019-01-29 ENCOUNTER — OFFICE VISIT (OUTPATIENT)
Dept: CARDIOLOGY CLINIC | Age: 84
End: 2019-01-29

## 2019-01-29 VITALS
HEIGHT: 60 IN | DIASTOLIC BLOOD PRESSURE: 72 MMHG | HEART RATE: 61 BPM | SYSTOLIC BLOOD PRESSURE: 150 MMHG | BODY MASS INDEX: 21.01 KG/M2 | OXYGEN SATURATION: 99 % | WEIGHT: 107 LBS

## 2019-01-29 DIAGNOSIS — I25.10 ATHEROSCLEROSIS OF NATIVE CORONARY ARTERY OF NATIVE HEART WITHOUT ANGINA PECTORIS: Primary | ICD-10-CM

## 2019-01-29 DIAGNOSIS — R42 DIZZINESS: ICD-10-CM

## 2019-01-29 DIAGNOSIS — R09.89 LEFT CAROTID BRUIT: ICD-10-CM

## 2019-01-29 DIAGNOSIS — I10 ESSENTIAL HYPERTENSION, BENIGN: ICD-10-CM

## 2019-01-29 DIAGNOSIS — H34.9 RETINAL EMBOLI, RIGHT: ICD-10-CM

## 2019-01-29 NOTE — PROGRESS NOTES
HISTORY OF PRESENT ILLNESS  Quan Ma is a 80 y.o. female. HPI  She has been feeling quite well. Other than a mild balance issue, she has no complaints. She denies chest pain, dyspnea, orthopnea, PND. She denies palpitation, dizziness or syncope. She denies any symptoms of TIA or amaurosis fugax. She looks very good for her age of 80. She has a history of mitral valve prolapse and a single isolated episode of loss of central vision of her right eye which was thought to be due to peripheral embolization from the mitral valve prolapse. She was placed on Inderal LA, which was not tolerated well. This was for migraines. Unexpectedly she had acute anterior ST-elevation myocardial infarction on February 22, 2010, and underwent primary intubation. Her cardiac catheterization demonstrated:    1. A 30% to 40% long stenosis of the dominant RCA in the mid segment. 2. Patent left main trunk. 3. A 20% to 30% stenosis of the circumflex coronary artery diffusely. 4. Total occlusion of the LAD in the proximal segment, with faint left-to-left collaterals. 5. Moderate anteroapical hypokinesis of the left ventricle, with EF in the 45% range. The LAD was subsequently intervened upon and stented with a 2.75-mm Cypher stent successfully. She had repeat echocardiogram on May 11, 2010 which demonstrated improved LV function with EF now up to 60%.    She had ambulatory blood pressure recording on 10/25/2011, which demonstrated mild hypertension with mean ambulatory blood pressure recordings of 148/64 on the current medical regimen. Review of Systems   Constitutional: Negative for malaise/fatigue and weight loss. HENT: Negative for hearing loss. Eyes: Negative for blurred vision and double vision. Respiratory: Negative for shortness of breath. Cardiovascular: Positive for leg swelling. Negative for chest pain, palpitations, orthopnea, claudication and PND.    Gastrointestinal: Negative for blood in stool, heartburn and melena. Genitourinary: Negative for dysuria, frequency, hematuria and urgency. Musculoskeletal: Negative for back pain and joint pain. Skin: Negative for itching and rash. Neurological: Positive for dizziness. Negative for loss of consciousness and weakness. Psychiatric/Behavioral: Negative for depression and memory loss. Physical Exam   Constitutional: She is oriented to person, place, and time. She appears well-developed and well-nourished. HENT:   Head: Normocephalic and atraumatic. Eyes: Conjunctivae are normal. Pupils are equal, round, and reactive to light. Neck: Normal range of motion. Neck supple. No JVD present. Cardiovascular: Normal rate, regular rhythm, S1 normal and S2 normal.  No extrasystoles are present. PMI is not displaced. Exam reveals no gallop and no friction rub. No murmur heard. Pulses:       Carotid pulses are 3+ on the right side with bruit, and 3+ on the left side with bruit. Pulmonary/Chest: Effort normal. She has no rales. Abdominal: Soft. There is no tenderness. Musculoskeletal: She exhibits no edema. Neurological: She is alert and oriented to person, place, and time. No cranial nerve deficit. Skin: Skin is warm and dry. Psychiatric: She has a normal mood and affect. Her behavior is normal.     Visit Vitals  /72   Pulse 61   Ht 5' (1.524 m)   Wt 48.5 kg (107 lb)   SpO2 99%   BMI 20.90 kg/m²       Past Medical History:   Diagnosis Date    Cardiac ambulatory blood pressure monitoring 10/25/2011    JNC-7 classification:  Stage 1 hypertension.  Cardiac cath 02/22/2010    mRCA 35%. LM patent. Cx 25%. pLAD 100% (2.75 x 23 mm Cypher stent) LVEDP  20.  EF 45%. Anteroapical mod HK    Cardiac echocardiogram 05/11/2010    EF 60%. Gr 2 DDfx. LAE. Mild MR.  Mild-mod PI; PASP 30 mmHg.  Carotid duplex 08/26/2013    Mild 1-49% bilateral ICA stenosis. Biphasic signals in both subclavian arteries.     Coronary artery disease     acute anterior ST-elevation myocardial infarction/primary stenting of the LAD on 02/22/10/EF 45%.  Coronary atherosclerosis of unspecified type of bypass graft(414.05)     Depressive disorder     Fatigue     Headache(784.0)     Heart disease, unspecified     Iron deficiency anemia, unspecified     Migraine     Migraine headache     Myocardial infarction ([facility] [address] 02/22/2010    Acute anterior wall w/primary intubation, stenting of prox LAD w/ 2.75-mm Cypher stent. 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  Senile cataract, unspecified     Valvular heart disease     Mitral valve prolapse. Social History     Socioeconomic History    Marital status:      Spouse name: Not on file    Number of children: Not on file    Years of education: Not on file    Highest education level: Not on file   Social Needs    Financial resource strain: Not on file    Food insecurity - worry: Not on file    Food insecurity - inability: Not on file    Transportation needs - medical: Not on file   [employer] needs - non-medical: Not on file   Occupational History    Not on file   Tobacco Use    Smoking status: Never Smoker    Smokeless tobacco: Never Used   Substance and Sexual Activity    Alcohol use: No    Drug use: No    Sexual activity: No     Partners: Male   Other Topics Concern    Not on file   Social History Narrative    Not on file       Family History   Problem Relation Age of Onset    Heart Disease Mother     Heart Disease Father        Past Surgical History:   Procedure Laterality Date    HX APPENDECTOMY      HX BREAST LUMPECTOMY      Left.  HX CATARACT REMOVAL  5/1/2013, 5/8/2013    left and right    HX CORONARY STENT PLACEMENT  2/22/10    prox LAD w/ 2.75-mm Cypher stent.     HX HEART CATHETERIZATION      HX TONSILLECTOMY         Current Outpatient Medications   Medication Sig Dispense Refill    escitalopram oxalate (LEXAPRO) 5 mg tablet TAKE ONE-HALF TO ONE TABLET BY MOUTH DAILY 90 Tab 0    metoprolol succinate (TOPROL-XL) 50 mg XL tablet TAKE ONE TABLET BY MOUTH DAILY 90 Tab 1    tolterodine ER (DETROL LA) 4 mg ER capsule 1 tablet daily 90 Cap 1    clopidogrel (PLAVIX) 75 mg tab TAKE ONE TABLET BY MOUTH DAILY 90 Tab 0    losartan (COZAAR) 50 mg tablet TAKE ONE TABLET BY MOUTH TWICE A  Tab 3    atorvastatin (LIPITOR) 40 mg tablet TAKE ONE TABLET BY MOUTH DAILY 90 Tab 1    ferrous sulfate (IRON) 325 mg (65 mg iron) tablet Take 1 Tab by mouth Daily (before breakfast). 30 Tab 2    bromfenac (BROMDAY) 0.09 % ophthalmic solution       meclizine (ANTIVERT) 12.5 mg tablet Take 1 Tab by mouth two (2) times daily as needed. 15 Tab 0    omeprazole (PRILOSEC) 40 mg capsule 1 capsule each AM 30 Cap 2    coenzyme q10-vitamin e (COQ10 ) 100-100 mg-unit cap Take  by mouth.  cyanocobalamin (VITAMIN B-12) 1,000 mcg tablet Take 1,000 mcg by mouth daily.  cholecalciferol, vitamin d3, (VITAMIN D) 1,000 unit tablet Take 1,000 Units by mouth daily.  calcium carbonate-vitamin d2 1,200-400 mg-unit Cap Take 1 Cap by mouth daily.  aspirin 81 mg tablet Take 81 mg by mouth daily. EKG: normal EKG, normal sinus rhythm, unchanged from previous tracings, nonspecific ST and T waves changes. ASSESSMENT and PLAN  Encounter Diagnoses   Name Primary?  Atherosclerosis of native coronary artery of native heart without angina pectoris Yes    Comment: stent LAD 2010 EF45% improved to 60%    Essential hypertension, benign     Dizziness     Left carotid bruit     Retinal emboli, right    She has been doing very well. She has had no symptoms to indicate angina or cardiac decompensation. She has had no atrial fibrillation at age 80. Her blood pressure has been under control. For now, continue on current medical regimen.

## 2019-01-29 NOTE — PROGRESS NOTES
Marino Guerrero presents today for   Chief Complaint   Patient presents with    Follow-up     6 month     Dizziness     on laying down     Leg Swelling     everyday both ankles        Esperanza Reyes preferred language for health care discussion is english/other. Is someone accompanying this pt? No     Is the patient using any DME equipment during OV? No     Depression Screening:  No flowsheet data found. Learning Assessment:  Learning Assessment 3/10/2015   PRIMARY LEARNER Patient   HIGHEST LEVEL OF EDUCATION - PRIMARY LEARNER  GRADUATED HIGH SCHOOL OR GED   BARRIERS PRIMARY LEARNER NONE   CO-LEARNER CAREGIVER No   PRIMARY LANGUAGE ENGLISH    NEED No   LEARNER PREFERENCE PRIMARY DEMONSTRATION   ANSWERED BY PATIENT   RELATIONSHIP SELF       Abuse Screening:  No flowsheet data found. Fall Risk  Fall Risk Assessment, last 12 mths 6/27/2017   Able to walk? Yes   Fall in past 12 months? No       Pt currently taking Antiplatelet therapy? Plavix    Coordination of Care:  1. Have you been to the ER, urgent care clinic since your last visit? Hospitalized since your last visit? No     2. Have you seen or consulted any other health care providers outside of the [address] [place] [address] since your last visit? Include any pap smears or colon screening.  No

## 2019-02-06 RX ORDER — METOPROLOL SUCCINATE 50 MG/1
TABLET, EXTENDED RELEASE ORAL
Qty: 30 TAB | Refills: 0 | Status: SHIPPED | OUTPATIENT
Start: 2019-02-06 | End: 2019-03-12 | Stop reason: SDUPTHER

## 2019-02-21 ENCOUNTER — OFFICE VISIT (OUTPATIENT)
Dept: INTERNAL MEDICINE CLINIC | Age: 84
End: 2019-02-21

## 2019-02-21 ENCOUNTER — HOSPITAL ENCOUNTER (OUTPATIENT)
Dept: LAB | Age: 84
Discharge: HOME | End: 2019-02-21
Payer: MEDICARE

## 2019-02-21 VITALS
WEIGHT: 105 LBS | DIASTOLIC BLOOD PRESSURE: 80 MMHG | HEIGHT: 60 IN | TEMPERATURE: 97.7 F | BODY MASS INDEX: 20.62 KG/M2 | OXYGEN SATURATION: 99 % | HEART RATE: 62 BPM | SYSTOLIC BLOOD PRESSURE: 180 MMHG

## 2019-02-21 DIAGNOSIS — R42 DIZZINESS: ICD-10-CM

## 2019-02-21 DIAGNOSIS — I10 ESSENTIAL HYPERTENSION, BENIGN: Primary | ICD-10-CM

## 2019-02-21 DIAGNOSIS — I10 ESSENTIAL HYPERTENSION, BENIGN: ICD-10-CM

## 2019-02-21 LAB
ALBUMIN SERPL-MCNC: 4.1 G/DL (ref 3.4–5)
ALBUMIN/GLOB SERPL: 1.8 {RATIO} (ref 0.8–1.7)
ALP SERPL-CCNC: 67 U/L (ref 45–117)
ALT SERPL-CCNC: 30 U/L (ref 13–56)
ANION GAP SERPL CALC-SCNC: 7 MMOL/L (ref 3–18)
AST SERPL-CCNC: 39 U/L (ref 15–37)
BASOPHILS # BLD: 0 K/UL (ref 0–0.1)
BASOPHILS NFR BLD: 1 % (ref 0–2)
BILIRUB SERPL-MCNC: 0.4 MG/DL (ref 0.2–1)
BUN SERPL-MCNC: 17 MG/DL (ref 7–18)
BUN/CREAT SERPL: 19 (ref 12–20)
CALCIUM SERPL-MCNC: 8.8 MG/DL (ref 8.5–10.1)
CHLORIDE SERPL-SCNC: 100 MMOL/L (ref 100–108)
CO2 SERPL-SCNC: 31 MMOL/L (ref 21–32)
CREAT SERPL-MCNC: 0.91 MG/DL (ref 0.6–1.3)
DIFFERENTIAL METHOD BLD: ABNORMAL
EOSINOPHIL # BLD: 0.2 K/UL (ref 0–0.4)
EOSINOPHIL NFR BLD: 3 % (ref 0–5)
ERYTHROCYTE [DISTWIDTH] IN BLOOD BY AUTOMATED COUNT: 13.8 % (ref 11.6–14.5)
GLOBULIN SER CALC-MCNC: 2.3 G/DL (ref 2–4)
GLUCOSE SERPL-MCNC: 84 MG/DL (ref 74–99)
HCT VFR BLD AUTO: 37 % (ref 35–45)
HGB BLD-MCNC: 11.7 G/DL (ref 12–16)
LYMPHOCYTES # BLD: 1.2 K/UL (ref 0.9–3.6)
LYMPHOCYTES NFR BLD: 20 % (ref 21–52)
MCH RBC QN AUTO: 30.4 PG (ref 24–34)
MCHC RBC AUTO-ENTMCNC: 31.6 G/DL (ref 31–37)
MCV RBC AUTO: 96.1 FL (ref 74–97)
MONOCYTES # BLD: 0.7 K/UL (ref 0.05–1.2)
MONOCYTES NFR BLD: 11 % (ref 3–10)
NEUTS SEG # BLD: 4.1 K/UL (ref 1.8–8)
NEUTS SEG NFR BLD: 65 % (ref 40–73)
PLATELET # BLD AUTO: 167 K/UL (ref 135–420)
PMV BLD AUTO: 11.6 FL (ref 9.2–11.8)
POTASSIUM SERPL-SCNC: 4.8 MMOL/L (ref 3.5–5.5)
PROT SERPL-MCNC: 6.4 G/DL (ref 6.4–8.2)
RBC # BLD AUTO: 3.85 M/UL (ref 4.2–5.3)
SODIUM SERPL-SCNC: 138 MMOL/L (ref 136–145)
WBC # BLD AUTO: 6.2 K/UL (ref 4.6–13.2)

## 2019-02-21 PROCEDURE — 36415 COLL VENOUS BLD VENIPUNCTURE: CPT

## 2019-02-21 PROCEDURE — 85025 COMPLETE CBC W/AUTO DIFF WBC: CPT

## 2019-02-21 PROCEDURE — 80053 COMPREHEN METABOLIC PANEL: CPT

## 2019-02-21 RX ORDER — MECLIZINE HCL 12.5 MG 12.5 MG/1
12.5 TABLET ORAL
Qty: 40 TAB | Refills: 2 | Status: SHIPPED | OUTPATIENT
Start: 2019-02-21 | End: 2019-08-11 | Stop reason: SDUPTHER

## 2019-02-21 NOTE — PROGRESS NOTES
Chief Complaint Patient presents with  Dizziness 3 days Depression Screening: No flowsheet data found. Learning Assessment: 
Learning Assessment 3/10/2015 PRIMARY LEARNER Patient HIGHEST LEVEL OF EDUCATION - PRIMARY LEARNER  GRADUATED HIGH SCHOOL OR GED  
BARRIERS PRIMARY LEARNER NONE  
CO-LEARNER CAREGIVER No  
PRIMARY LANGUAGE ENGLISH  NEED No  
LEARNER PREFERENCE PRIMARY DEMONSTRATION  
ANSWERED BY PATIENT  
RELATIONSHIP SELF Abuse Screening: No flowsheet data found. Fall Risk Fall Risk Assessment, last 12 mths 2/21/2019 Able to walk? Yes Fall in past 12 months? No  
 
 
 
 
1. Have you been to the ER, urgent care clinic since your last visit? Hospitalized since your last visit? No 
 
2. Have you seen or consulted any other health care providers outside of the [address] [place] [address] since your last visit? Include any pap smears or colon screening.  No

## 2019-02-23 NOTE — PROGRESS NOTES
The patient presents to the office today with the chief complaint of dizziness HPI The patient remains on Losartan for hypertension. She is doing well on the medication. The patient has 3 days of vertigo. The problem is worse with changes in position - such as lying back. Review of Systems Respiratory: Negative for shortness of breath. Cardiovascular: Negative for chest pain and leg swelling. Neurological: Positive for dizziness. Allergies Allergen Reactions  Viibryd [Vilazodone] Anaphylaxis and Vertigo  Coreg [Carvedilol] Other (comments) Pt states coreg makes her feel like a \"zombie\"  Erythromycin Nausea Only Severe.  Penicillins Hives Current Outpatient Medications Medication Sig Dispense Refill  meclizine (ANTIVERT) 12.5 mg tablet Take 1 Tab by mouth two (2) times daily as needed. 40 Tab 2  
 metoprolol succinate (TOPROL-XL) 50 mg XL tablet TAKE ONE TABLET BY MOUTH DAILY 30 Tab 0  
 escitalopram oxalate (LEXAPRO) 5 mg tablet TAKE ONE-HALF TO ONE TABLET BY MOUTH DAILY 90 Tab 0  
 tolterodine ER (DETROL LA) 4 mg ER capsule 1 tablet daily 90 Cap 1  clopidogrel (PLAVIX) 75 mg tab TAKE ONE TABLET BY MOUTH DAILY 90 Tab 0  
 losartan (COZAAR) 50 mg tablet TAKE ONE TABLET BY MOUTH TWICE A  Tab 3  
 atorvastatin (LIPITOR) 40 mg tablet TAKE ONE TABLET BY MOUTH DAILY 90 Tab 1  
 ferrous sulfate (IRON) 325 mg (65 mg iron) tablet Take 1 Tab by mouth Daily (before breakfast). 30 Tab 2  
 omeprazole (PRILOSEC) 40 mg capsule 1 capsule each AM 30 Cap 2  cyanocobalamin (VITAMIN B-12) 1,000 mcg tablet Take 1,000 mcg by mouth daily.  cholecalciferol, vitamin d3, (VITAMIN D) 1,000 unit tablet Take 1,000 Units by mouth daily.  aspirin 81 mg tablet Take 81 mg by mouth daily. Past Medical History:  
Diagnosis Date  Cardiac ambulatory blood pressure monitoring 10/25/2011 JNC-7 classification:  Stage 1 hypertension.  Cardiac cath 02/22/2010  
 mRCA 35%. LM patent. Cx 25%. pLAD 100% (2.75 x 23 mm Cypher stent) LVEDP  20.  EF 45%. Anteroapical mod HK  Cardiac echocardiogram 05/11/2010 EF 60%. Gr 2 DDfx. LAE. Mild MR.  Mild-mod PI; PASP 30 mmHg.  Carotid duplex 08/26/2013 Mild 1-49% bilateral ICA stenosis. Biphasic signals in both subclavian arteries.  Coronary artery disease   
 acute anterior ST-elevation myocardial infarction/primary stenting of the LAD on 02/22/10/EF 45%.  Coronary atherosclerosis of unspecified type of bypass graft(414.05)  Depressive disorder  Fatigue  Headache(784.0)  Heart disease, unspecified  Iron deficiency anemia, unspecified  Migraine  Migraine headache  Myocardial infarction ([address] 02/22/2010 Acute anterior wall w/primary intubation, stenting of prox LAD w/ 2.75-mm Cypher stent.  Peripheral neuropathy  Pure hypercholesterolemia  Retinal emboli, right   
 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  Senile cataract, unspecified  Valvular heart disease Mitral valve prolapse. Past Surgical History:  
Procedure Laterality Date  HX APPENDECTOMY  HX BREAST LUMPECTOMY Left.  HX CATARACT REMOVAL  5/1/2013, 5/8/2013  
 left and right  HX CORONARY STENT PLACEMENT  2/22/10  
 prox LAD w/ 2.75-mm Cypher stent.  HX HEART CATHETERIZATION    
 HX TONSILLECTOMY Social History Socioeconomic History  Marital status:  Spouse name: Not on file  Number of children: Not on file  Years of education: Not on file  Highest education level: Not on file Social Needs  Financial resource strain: Not on file  Food insecurity - worry: Not on file  Food insecurity - inability: Not on file  Transportation needs - medical: Not on file  Transportation needs - non-medical: Not on file Occupational History  Not on file Tobacco Use  Smoking status: Never Smoker  Smokeless tobacco: Never Used Substance and Sexual Activity  Alcohol use: No  
 Drug use: No  
 Sexual activity: No  
  Partners: Male Other Topics Concern  Not on file Social History Narrative  Not on file Patient does not have an advanced directive on file Visit Vitals /80 (BP 1 Location: Left arm, BP Patient Position: Sitting) Pulse 62 Temp 97.7 °F (36.5 °C) (Tympanic) Ht 5' (1.524 m) Wt 105 lb (47.6 kg) SpO2 99% BMI 20.51 kg/m² Physical Exam  
Neck: Carotid bruit is not present. Cardiovascular: Normal rate and regular rhythm. Exam reveals no gallop. No murmur heard. Pulmonary/Chest: She has no wheezes. She has no rales. Abdominal: Soft. She exhibits no distension. There is no tenderness. Musculoskeletal: She exhibits no edema. BMI:  [facility] Outpatient Visit on 02/21/2019 Component Date Value Ref Range Status  WBC 02/21/2019 6.2  4.6 - 13.2 K/uL Final  
 RBC 02/21/2019 3.85* 4.20 - 5.30 M/uL Final  
 HGB 02/21/2019 11.7* 12.0 - 16.0 g/dL Final  
 HCT 02/21/2019 37.0  35.0 - 45.0 % Final  
 MCV 02/21/2019 96.1  74.0 - 97.0 FL Final  
 MCH 02/21/2019 30.4  24.0 - 34.0 PG Final  
 MCHC 02/21/2019 31.6  31.0 - 37.0 g/dL Final  
 RDW 02/21/2019 13.8  11.6 - 14.5 % Final  
 PLATELET 76/52/0126 875  135 - 420 K/uL Final  
 MPV 02/21/2019 11.6  9.2 - 11.8 FL Final  
 NEUTROPHILS 02/21/2019 65  40 - 73 % Final  
 LYMPHOCYTES 02/21/2019 20* 21 - 52 % Final  
 MONOCYTES 02/21/2019 11* 3 - 10 % Final  
 EOSINOPHILS 02/21/2019 3  0 - 5 % Final  
 BASOPHILS 02/21/2019 1  0 - 2 % Final  
 ABS. NEUTROPHILS 02/21/2019 4.1  1.8 - 8.0 K/UL Final  
 ABS. LYMPHOCYTES 02/21/2019 1.2  0.9 - 3.6 K/UL Final  
 ABS. MONOCYTES 02/21/2019 0.7  0.05 - 1.2 K/UL Final  
 ABS. EOSINOPHILS 02/21/2019 0.2  0.0 - 0.4 K/UL Final  
 ABS.  BASOPHILS 02/21/2019 0.0  0.0 - 0.1 K/UL Final  
  DF 02/21/2019 AUTOMATED    Final  
 Sodium 02/21/2019 138  136 - 145 mmol/L Final  
 Potassium 02/21/2019 4.8  3.5 - 5.5 mmol/L Final  
 Chloride 02/21/2019 100  100 - 108 mmol/L Final  
 CO2 02/21/2019 31  21 - 32 mmol/L Final  
 Anion gap 02/21/2019 7  3.0 - 18 mmol/L Final  
 Glucose 02/21/2019 84  74 - 99 mg/dL Final  
 BUN 02/21/2019 17  7.0 - 18 MG/DL Final  
 Creatinine 02/21/2019 0.91  0.6 - 1.3 MG/DL Final  
 BUN/Creatinine ratio 02/21/2019 19  12 - 20   Final  
 GFR est AA 02/21/2019 >60  >60 ml/min/1.73m2 Final  
 GFR est non-AA 02/21/2019 57* >60 ml/min/1.73m2 Final  
 Comment: (NOTE) Estimated GFR is calculated using the Modification of Diet in Renal  
Disease (MDRD) Study equation, reported for both  [facility]) and non- Americans (GFRNA), and normalized to 1.73m2  
body surface area. The physician must decide which value applies to  
the patient. The MDRD study equation should only be used in  
individuals age 25 or older. It has not been validated for the  
following: pregnant women, patients with serious comorbid conditions,  
or on certain medications, or persons with extremes of body size,  
muscle mass, or nutritional status.  Calcium 02/21/2019 8.8  8.5 - 10.1 MG/DL Final  
 Bilirubin, total 02/21/2019 0.4  0.2 - 1.0 MG/DL Final  
 ALT (SGPT) 02/21/2019 30  13 - 56 U/L Final  
 AST (SGOT) 02/21/2019 39* 15 - 37 U/L Final  
 Alk. phosphatase 02/21/2019 67  45 - 117 U/L Final  
 Protein, total 02/21/2019 6.4  6.4 - 8.2 g/dL Final  
 Albumin 02/21/2019 4.1  3.4 - 5.0 g/dL Final  
 Globulin 02/21/2019 2.3  2.0 - 4.0 g/dL Final  
 A-G Ratio 02/21/2019 1.8* 0.8 - 1.7   Final  
 
 
. Results for orders placed or performed during the hospital encounter of 02/21/19 CBC WITH AUTOMATED DIFF Result Value Ref Range WBC 6.2 4.6 - 13.2 K/uL  
 RBC 3.85 (L) 4.20 - 5.30 M/uL  
 HGB 11.7 (L) 12.0 - 16.0 g/dL HCT 37.0 35.0 - 45.0 % MCV 96.1 74.0 - 97.0 FL  
 MCH 30.4 24.0 - 34.0 PG  
 MCHC 31.6 31.0 - 37.0 g/dL  
 RDW 13.8 11.6 - 14.5 % PLATELET 324 354 - 754 K/uL MPV 11.6 9.2 - 11.8 FL  
 NEUTROPHILS 65 40 - 73 % LYMPHOCYTES 20 (L) 21 - 52 % MONOCYTES 11 (H) 3 - 10 % EOSINOPHILS 3 0 - 5 % BASOPHILS 1 0 - 2 %  
 ABS. NEUTROPHILS 4.1 1.8 - 8.0 K/UL  
 ABS. LYMPHOCYTES 1.2 0.9 - 3.6 K/UL  
 ABS. MONOCYTES 0.7 0.05 - 1.2 K/UL  
 ABS. EOSINOPHILS 0.2 0.0 - 0.4 K/UL  
 ABS. BASOPHILS 0.0 0.0 - 0.1 K/UL  
 DF AUTOMATED METABOLIC PANEL, COMPREHENSIVE Result Value Ref Range Sodium 138 136 - 145 mmol/L Potassium 4.8 3.5 - 5.5 mmol/L Chloride 100 100 - 108 mmol/L  
 CO2 31 21 - 32 mmol/L Anion gap 7 3.0 - 18 mmol/L Glucose 84 74 - 99 mg/dL BUN 17 7.0 - 18 MG/DL Creatinine 0.91 0.6 - 1.3 MG/DL  
 BUN/Creatinine ratio 19 12 - 20 GFR est AA >60 >60 ml/min/1.73m2 GFR est non-AA 57 (L) >60 ml/min/1.73m2 Calcium 8.8 8.5 - 10.1 MG/DL Bilirubin, total 0.4 0.2 - 1.0 MG/DL  
 ALT (SGPT) 30 13 - 56 U/L  
 AST (SGOT) 39 (H) 15 - 37 U/L Alk. phosphatase 67 45 - 117 U/L Protein, total 6.4 6.4 - 8.2 g/dL Albumin 4.1 3.4 - 5.0 g/dL Globulin 2.3 2.0 - 4.0 g/dL A-G Ratio 1.8 (H) 0.8 - 1.7 Assessment / Plan ICD-10-CM ICD-9-CM 1. Essential hypertension, benign I10 401.1 CBC WITH AUTOMATED DIFF 2. Dizziness R42 780.4 CBC WITH AUTOMATED DIFF  
   METABOLIC PANEL, COMPREHENSIVE  
   meclizine (ANTIVERT) 12.5 mg tablet Labs [address] 
she was advised to continue her maintenance medications 
she is to call if symptoms persist over five days Follow-up Disposition: 
Return in about 5 months (around 7/21/2019). I asked Esperanza Abdi if she has any questions and I answered the questions. Esperanza Abdi states that she understands the treatment plan and agrees with the treatment plan

## 2019-03-01 RX ORDER — ATORVASTATIN CALCIUM 40 MG/1
TABLET, FILM COATED ORAL
Qty: 53 TAB | Refills: 0 | Status: SHIPPED | OUTPATIENT
Start: 2019-03-01 | End: 2019-07-07 | Stop reason: SDUPTHER

## 2019-03-06 RX ORDER — ATORVASTATIN CALCIUM 40 MG/1
TABLET, FILM COATED ORAL
Qty: 53 TAB | Refills: 0 | Status: SHIPPED | OUTPATIENT
Start: 2019-03-06 | End: 2019-06-07 | Stop reason: SDUPTHER

## 2019-03-12 RX ORDER — METOPROLOL SUCCINATE 50 MG/1
TABLET, EXTENDED RELEASE ORAL
Qty: 30 TAB | Refills: 0 | Status: SHIPPED | OUTPATIENT
Start: 2019-03-12 | End: 2019-04-05 | Stop reason: SDUPTHER

## 2019-03-18 RX ORDER — CLOPIDOGREL BISULFATE 75 MG/1
TABLET ORAL
Qty: 90 TAB | Refills: 0 | Status: SHIPPED | OUTPATIENT
Start: 2019-03-18 | End: 2019-06-07 | Stop reason: SDUPTHER

## 2019-04-05 RX ORDER — METOPROLOL SUCCINATE 50 MG/1
TABLET, EXTENDED RELEASE ORAL
Qty: 30 TAB | Refills: 0 | Status: SHIPPED | OUTPATIENT
Start: 2019-04-05 | End: 2019-05-08 | Stop reason: SDUPTHER

## 2019-04-05 RX ORDER — ESCITALOPRAM OXALATE 5 MG/1
TABLET ORAL
Qty: 90 TAB | Refills: 0 | Status: SHIPPED | OUTPATIENT
Start: 2019-04-05 | End: 2019-07-07 | Stop reason: SDUPTHER

## 2019-04-09 RX ORDER — LOSARTAN POTASSIUM 50 MG/1
TABLET ORAL
Qty: 180 TAB | Refills: 3 | Status: SHIPPED | OUTPATIENT
Start: 2019-04-09

## 2019-05-08 RX ORDER — METOPROLOL SUCCINATE 50 MG/1
TABLET, EXTENDED RELEASE ORAL
Qty: 30 TAB | Refills: 0 | Status: SHIPPED | OUTPATIENT
Start: 2019-05-08 | End: 2019-06-07 | Stop reason: SDUPTHER

## 2019-06-07 DIAGNOSIS — N32.81 OVERACTIVE BLADDER: ICD-10-CM

## 2019-06-07 RX ORDER — METOPROLOL SUCCINATE 50 MG/1
TABLET, EXTENDED RELEASE ORAL
Qty: 30 TAB | Refills: 0 | Status: SHIPPED | OUTPATIENT
Start: 2019-06-07 | End: 2019-07-07 | Stop reason: SDUPTHER

## 2019-06-07 RX ORDER — ATORVASTATIN CALCIUM 40 MG/1
TABLET, FILM COATED ORAL
Qty: 53 TAB | Refills: 0 | Status: SHIPPED | OUTPATIENT
Start: 2019-06-07 | End: 2019-06-20 | Stop reason: SDUPTHER

## 2019-06-07 RX ORDER — CLOPIDOGREL BISULFATE 75 MG/1
TABLET ORAL
Qty: 90 TAB | Refills: 0 | Status: SHIPPED | OUTPATIENT
Start: 2019-06-07 | End: 2019-06-20 | Stop reason: SDUPTHER

## 2019-06-07 RX ORDER — TOLTERODINE 4 MG/1
CAPSULE, EXTENDED RELEASE ORAL
Qty: 30 CAP | Refills: 3 | Status: SHIPPED | OUTPATIENT
Start: 2019-06-07 | End: 2019-10-18 | Stop reason: SDUPTHER

## 2019-06-15 ENCOUNTER — HOSPITAL ENCOUNTER (EMERGENCY)
Age: 84
Discharge: HOME | End: 2019-06-15
Attending: EMERGENCY MEDICINE | Admitting: EMERGENCY MEDICINE
Payer: MEDICARE

## 2019-06-15 ENCOUNTER — APPOINTMENT (OUTPATIENT)
Dept: CT IMAGING | Age: 84
End: 2019-06-15
Attending: PHYSICIAN ASSISTANT
Payer: MEDICARE

## 2019-06-15 VITALS
RESPIRATION RATE: 14 BRPM | OXYGEN SATURATION: 92 % | TEMPERATURE: 98.1 F | DIASTOLIC BLOOD PRESSURE: 59 MMHG | SYSTOLIC BLOOD PRESSURE: 168 MMHG | HEART RATE: 54 BPM

## 2019-06-15 DIAGNOSIS — S09.90XA TRAUMATIC INJURY OF HEAD, INITIAL ENCOUNTER: Primary | ICD-10-CM

## 2019-06-15 DIAGNOSIS — S01.01XA LACERATION OF SCALP, INITIAL ENCOUNTER: ICD-10-CM

## 2019-06-15 PROCEDURE — 70450 CT HEAD/BRAIN W/O DYE: CPT

## 2019-06-15 PROCEDURE — 77030039266 HC ADH SKN EXOFIN S2SG -A

## 2019-06-15 PROCEDURE — 90471 IMMUNIZATION ADMIN: CPT

## 2019-06-15 PROCEDURE — 74011250636 HC RX REV CODE- 250/636: Performed by: PHYSICIAN ASSISTANT

## 2019-06-15 PROCEDURE — 96360 HYDRATION IV INFUSION INIT: CPT

## 2019-06-15 PROCEDURE — 90715 TDAP VACCINE 7 YRS/> IM: CPT | Performed by: PHYSICIAN ASSISTANT

## 2019-06-15 PROCEDURE — 99284 EMERGENCY DEPT VISIT MOD MDM: CPT

## 2019-06-15 PROCEDURE — 75810000293 HC SIMP/SUPERF WND  RPR

## 2019-06-15 RX ADMIN — SODIUM CHLORIDE 1000 ML: 900 INJECTION, SOLUTION INTRAVENOUS at 20:55

## 2019-06-15 RX ADMIN — TETANUS TOXOID, REDUCED DIPHTHERIA TOXOID AND ACELLULAR PERTUSSIS VACCINE, ADSORBED 0.5 ML: 5; 2.5; 8; 8; 2.5 SUSPENSION INTRAMUSCULAR at 22:37

## 2019-06-16 NOTE — DISCHARGE INSTRUCTIONS
Patient Education        Learning About a Closed Head Injury  What is a closed head injury? A closed head injury happens when your head gets hit hard. The strong force of the blow causes your brain to shake in your skull. This movement can cause the brain to bruise, swell, or tear. Sometimes nerves or blood vessels also get damaged. This can cause bleeding in or around the brain. A concussion is a type of closed head injury. What are the symptoms? If you have a mild concussion, you may have a mild headache or feel \"not quite right. \" These symptoms are common. They usually go away over a few days to 4 weeks. But sometimes after a concussion, you feel like you can't function as well as before the injury. And you have new symptoms. This is called postconcussive syndrome. You may:  · Find it harder to solve problems, think, concentrate, or remember. · Have headaches. · Have changes in your sleep patterns, such as not being able to sleep or sleeping all the time. · Have changes in your personality. · Not be interested in your usual activities. · Feel angry or anxious without a clear reason. · Lose your sense of taste or smell. · Be dizzy, lightheaded, or unsteady. It may be hard to stand or walk. How is a closed head injury treated? Any person who may have a concussion needs to see a doctor. Some people have to stay in the hospital to be watched. Others can go home safely. If you go home, follow your doctor's instructions. He or she will tell you if you need someone to watch you closely for the next 24 hours or longer. Rest is the best treatment. Get plenty of sleep at night. And try to rest during the day. · Avoid activities that are physically or mentally demanding. These include housework, exercise, and schoolwork. And don't play video games, send text messages, or use the computer. You may need to change your school or work schedule to be able to avoid these activities.   · Ask your doctor when it's okay to drive, ride a bike, or operate machinery. · Take an over-the-counter pain medicine, such as acetaminophen (Tylenol), ibuprofen (Advil, Motrin), or naproxen (Aleve). Be safe with medicines. Read and follow all instructions on the label. · Check with your doctor before you use any other medicines for pain. · Do not drink alcohol or use illegal drugs. They can slow recovery. They can also increase your risk of getting a second head injury. Follow-up care is a key part of your treatment and safety. Be sure to make and go to all appointments, and call your doctor if you are having problems. It's also a good idea to know your test results and keep a list of the medicines you take. Where can you learn more? Go to http://chelita-catrachito.info/. Enter E235 in the search box to learn more about \"Learning About a Closed Head Injury. \"  Current as of: Sarah 3, 2018  Content Version: 11.9  © 0250-9743 [employer]. Care instructions adapted under license by [employer] (which disclaims liability or warranty for this information). If you have questions about a medical condition or this instruction, always ask your healthcare professional. [address] any warranty or liability for your use of this information.

## 2019-06-20 ENCOUNTER — OFFICE VISIT (OUTPATIENT)
Dept: FAMILY MEDICINE CLINIC | Facility: CLINIC | Age: 84
End: 2019-06-20

## 2019-06-20 ENCOUNTER — HOSPITAL ENCOUNTER (OUTPATIENT)
Dept: LAB | Age: 84
Discharge: HOME | End: 2019-06-20
Payer: MEDICARE

## 2019-06-20 VITALS
SYSTOLIC BLOOD PRESSURE: 138 MMHG | TEMPERATURE: 96.6 F | HEIGHT: 60 IN | OXYGEN SATURATION: 99 % | DIASTOLIC BLOOD PRESSURE: 58 MMHG | BODY MASS INDEX: 21.17 KG/M2 | WEIGHT: 107.8 LBS | HEART RATE: 67 BPM

## 2019-06-20 DIAGNOSIS — N32.81 OVERACTIVE BLADDER: ICD-10-CM

## 2019-06-20 DIAGNOSIS — I10 ESSENTIAL HYPERTENSION, BENIGN: ICD-10-CM

## 2019-06-20 DIAGNOSIS — I25.10 CORONARY ARTERY DISEASE INVOLVING NATIVE CORONARY ARTERY OF NATIVE HEART WITHOUT ANGINA PECTORIS: ICD-10-CM

## 2019-06-20 DIAGNOSIS — S00.03XD CONTUSION OF SCALP, SUBSEQUENT ENCOUNTER: Primary | ICD-10-CM

## 2019-06-20 DIAGNOSIS — S00.03XD CONTUSION OF SCALP, SUBSEQUENT ENCOUNTER: ICD-10-CM
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
## 2019-06-20 LAB
ALBUMIN SERPL-MCNC: 4 G/DL (ref 3.4–5)
ALBUMIN/GLOB SERPL: 2 {RATIO} (ref 0.8–1.7)
ALP SERPL-CCNC: 56 U/L (ref 45–117)
ALT SERPL-CCNC: 31 U/L (ref 13–56)
ANION GAP SERPL CALC-SCNC: 8 MMOL/L (ref 3–18)
AST SERPL-CCNC: 35 U/L (ref 15–37)
BASOPHILS # BLD: 0 K/UL (ref 0–0.1)
BASOPHILS NFR BLD: 1 % (ref 0–2)
BILIRUB SERPL-MCNC: 0.4 MG/DL (ref 0.2–1)
BUN SERPL-MCNC: 29 MG/DL (ref 7–18)
BUN/CREAT SERPL: 23 (ref 12–20)
CALCIUM SERPL-MCNC: 8.5 MG/DL (ref 8.5–10.1)
CHLORIDE SERPL-SCNC: 103 MMOL/L (ref 100–108)
CO2 SERPL-SCNC: 26 MMOL/L (ref 21–32)
CREAT SERPL-MCNC: 1.25 MG/DL (ref 0.6–1.3)
DIFFERENTIAL METHOD BLD: ABNORMAL
EOSINOPHIL # BLD: 0.4 K/UL (ref 0–0.4)
EOSINOPHIL NFR BLD: 5 % (ref 0–5)
ERYTHROCYTE [DISTWIDTH] IN BLOOD BY AUTOMATED COUNT: 16 % (ref 11.6–14.5)
GLOBULIN SER CALC-MCNC: 2 G/DL (ref 2–4)
GLUCOSE SERPL-MCNC: 88 MG/DL (ref 74–99)
HCT VFR BLD AUTO: 21.4 % (ref 35–45)
HGB BLD-MCNC: 6.7 G/DL (ref 12–16)
LYMPHOCYTES # BLD: 1.6 K/UL (ref 0.9–3.6)
LYMPHOCYTES NFR BLD: 20 % (ref 21–52)
MCH RBC QN AUTO: 30.3 PG (ref 24–34)
MCHC RBC AUTO-ENTMCNC: 31.3 G/DL (ref 31–37)
MCV RBC AUTO: 96.8 FL (ref 74–97)
MONOCYTES # BLD: 0.9 K/UL (ref 0.05–1.2)
MONOCYTES NFR BLD: 11 % (ref 3–10)
NEUTS SEG # BLD: 5 K/UL (ref 1.8–8)
NEUTS SEG NFR BLD: 63 % (ref 40–73)
PLATELET # BLD AUTO: 161 K/UL (ref 135–420)
PMV BLD AUTO: 10.4 FL (ref 9.2–11.8)
POTASSIUM SERPL-SCNC: 4.9 MMOL/L (ref 3.5–5.5)
PROT SERPL-MCNC: 6 G/DL (ref 6.4–8.2)
RBC # BLD AUTO: 2.21 M/UL (ref 4.2–5.3)
SODIUM SERPL-SCNC: 137 MMOL/L (ref 136–145)
WBC # BLD AUTO: 7.8 K/UL (ref 4.6–13.2)

## 2019-06-20 PROCEDURE — 85025 COMPLETE CBC W/AUTO DIFF WBC: CPT

## 2019-06-20 PROCEDURE — 80053 COMPREHEN METABOLIC PANEL: CPT

## 2019-06-20 PROCEDURE — 36415 COLL VENOUS BLD VENIPUNCTURE: CPT

## 2019-06-20 NOTE — PROGRESS NOTES
Sreedhar Tsai presents today for   Chief Complaint   Patient presents with    Well Woman   Raciel Sales     [facility] has stitches  0/[birth year]       Sreedhar Alexisite preferred language for health care discussion is english. Is someone accompanying this pt? no    Is the patient using any DME equipment during OV? no    Depression Screening:  No flowsheet data found. Learning Assessment:  Learning Assessment 3/10/2015   PRIMARY LEARNER Patient   HIGHEST LEVEL OF EDUCATION - PRIMARY LEARNER  GRADUATED HIGH SCHOOL OR GED   BARRIERS PRIMARY LEARNER NONE   CO-LEARNER CAREGIVER No   PRIMARY LANGUAGE ENGLISH    NEED No   LEARNER PREFERENCE PRIMARY DEMONSTRATION   ANSWERED BY PATIENT   RELATIONSHIP SELF       Abuse Screening:  No flowsheet data found. Fall Risk  Fall Risk Assessment, last 12 mths 6/20/2019   Able to walk? Yes   Fall in past 12 months? Yes   Number of falls in past 12 months 4       Health Maintenance reviewed and discussed and ordered per Provider. Health Maintenance Due   Topic Date Due    Shingrix Vaccine Age 49> (1 of 2) 12/22/1973    GLAUCOMA SCREENING Q2Y  12/22/1988    Bone Densitometry (Dexa) Screening  12/22/1988    MEDICARE YEARLY EXAM  03/14/2018   . Sreedhar Favorite is updated on all     Pt currently taking Antiplatelet therapy? no    Coordination of Care:  1. Have you been to the ER, urgent care clinic since your last visit? Yes [facility] 6/15/19 for a fall/ stitches in side of left face/head  Hospitalized since your last visit? no    2. Have you seen or consulted any other health care providers outside of the [address] [place] [address] since your last visit? no Include any pap smears or colon screening.  no

## 2019-06-23 NOTE — PROGRESS NOTES
The patient presents to the office today with the chief complaint of contusion left forehead    HPI    The patient apparently was bending over to pick something up at home approximately 4 days ago. She lost her balance and fell. The patient struck her left side of her forehead. The patient was seen at the emergency room. Head CT was negative. Patient had a small laceration in her scalp. Even though it was small at initially bled profusely. The bleeding stopped with pressure applied in the emergency room and is suture the patient denies headache. The patient has coronary artery disease. The patient remains on Plavix and metoprolol. Patient denies chest pain or shortness of breath. The patient remains on losartan for hypertension. Patient is doing well on the medication. The patient is on Detrol for overactive bladder. She finds that the medication is moderately effective. ROS  Urinary frequency as noted above  Negative for headache, chest pain, or shortness of breath    Allergies   Allergen Reactions    Viibryd [Vilazodone] Anaphylaxis and Vertigo    Coreg [Carvedilol] Other (comments)     Pt states coreg makes her feel like a \"zombie\"    Erythromycin Nausea Only     Severe.  Penicillins Hives       Current Outpatient Medications   Medication Sig Dispense Refill    COQ10, UBIQUINOL, PO Take 100 mg by mouth daily.  tolterodine ER (DETROL LA) 4 mg ER capsule TAKE ONE CAPSULE BY MOUTH DAILY 30 Cap 3    metoprolol succinate (TOPROL-XL) 50 mg XL tablet TAKE ONE TABLET BY MOUTH DAILY 30 Tab 0    losartan (COZAAR) 50 mg tablet TAKE ONE TABLET BY MOUTH TWICE A  Tab 3    escitalopram oxalate (LEXAPRO) 5 mg tablet TAKE ONE-HALF TO ONE TABLET BY MOUTH DAILY 90 Tab 0    atorvastatin (LIPITOR) 40 mg tablet TAKE ONE TABLET BY MOUTH DAILY 53 Tab 0    meclizine (ANTIVERT) 12.5 mg tablet Take 1 Tab by mouth two (2) times daily as needed.  40 Tab 2    clopidogrel (PLAVIX) 75 mg tab TAKE ONE TABLET BY MOUTH DAILY 90 Tab 0    ferrous sulfate (IRON) 325 mg (65 mg iron) tablet Take 1 Tab by mouth Daily (before breakfast). 30 Tab 2    aspirin 81 mg tablet Take 81 mg by mouth daily. Past Medical History:   Diagnosis Date    Cardiac ambulatory blood pressure monitoring 10/25/2011    JNC-7 classification:  Stage 1 hypertension.  Cardiac cath 02/22/2010    mRCA 35%. LM patent. Cx 25%. pLAD 100% (2.75 x 23 mm Cypher stent) LVEDP  20.  EF 45%. Anteroapical mod HK    Cardiac echocardiogram 05/11/2010    EF 60%. Gr 2 DDfx. LAE. Mild MR.  Mild-mod PI; PASP 30 mmHg.  Carotid duplex 08/26/2013    Mild 1-49% bilateral ICA stenosis. Biphasic signals in both subclavian arteries.  Coronary artery disease     acute anterior ST-elevation myocardial infarction/primary stenting of the LAD on 02/22/10/EF 45%.  Coronary atherosclerosis of unspecified type of bypass graft(414.05)     Depressive disorder     Fatigue     Headache(784.0)     Heart disease, unspecified     Iron deficiency anemia, unspecified     Migraine     Migraine headache     Myocardial infarction ([address] 02/22/2010    Acute anterior wall w/primary intubation, stenting of prox LAD w/ 2.75-mm Cypher stent. 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  Senile cataract, unspecified     Valvular heart disease     Mitral valve prolapse. Past Surgical History:   Procedure Laterality Date    HX APPENDECTOMY      HX BREAST LUMPECTOMY      Left.  HX CATARACT REMOVAL  5/1/2013, 5/8/2013    left and right    HX CORONARY STENT PLACEMENT  2/22/10    prox LAD w/ 2.75-mm Cypher stent.     HX HEART CATHETERIZATION      HX TONSILLECTOMY         Social History     Socioeconomic History    Marital status:      Spouse name: Not on file    Number of children: Not on file    Years of education: Not on file    Highest education level: Not on file   Occupational History    Not on file   Social Needs    Financial resource strain: Not on file    Food insecurity:     Worry: Not on file     Inability: Not on file    Transportation needs:     Medical: Not on file     Non-medical: Not on file   Tobacco Use    Smoking status: Never Smoker    Smokeless tobacco: Never Used   Substance and Sexual Activity    Alcohol use: No    Drug use: No    Sexual activity: Never     Partners: Male   Lifestyle    Physical activity:     Days per week: Not on file     Minutes per session: Not on file    Stress: Not on file   Relationships    Social connections:     Talks on phone: Not on file     Gets together: Not on file     Attends Taoist service: Not on file     Active member of club or organization: Not on file     Attends meetings of clubs or organizations: Not on file     Relationship status: Not on file    Intimate partner violence:     Fear of current or ex partner: Not on file     Emotionally abused: Not on file     Physically abused: Not on file     Forced sexual activity: Not on file   Other Topics Concern    Not on file   Social History Narrative    Not on file       Patient does not have an advanced directive on file    Visit Vitals  /58 (BP 1 Location: Left arm, BP Patient Position: Sitting)   Pulse 67   Temp 96.6 °F (35.9 °C) (Tympanic)   Ht 5' (1.524 m)   Wt 107 lb 12.8 oz (48.9 kg)   SpO2 99%   BMI 21.05 kg/m²       Physical Exam  A 1 inch laceration is noted on the left forehead. There is one suture in place. The laceration is closed. There is a small surrounding hematoma.   Lungs are clear to station percussion  Cardiac exam: S1 and S2 normal, no gallops, no murmurs  No carotid bruits  Abdomen is soft and nontender  No lower extremity edema    BMI: [facility] Outpatient Visit on 06/20/2019   Component Date Value Ref Range Status    WBC 06/20/2019 7.8  4.6 - 13.2 K/uL Final    RBC 06/20/2019 2.21* 4.20 - 5.30 M/uL Final    HGB 06/20/2019 6.7* 12.0 - 16.0 g/dL Final    HCT 06/20/2019 21.4* 35.0 - 45.0 % Final    MCV 06/20/2019 96.8  74.0 - 97.0 FL Final    MCH 06/20/2019 30.3  24.0 - 34.0 PG Final    MCHC 06/20/2019 31.3  31.0 - 37.0 g/dL Final    RDW 06/20/2019 16.0* 11.6 - 14.5 % Final    PLATELET 80/14/8878 115  135 - 420 K/uL Final    MPV 06/20/2019 10.4  9.2 - 11.8 FL Final    NEUTROPHILS 06/20/2019 63  40 - 73 % Final    LYMPHOCYTES 06/20/2019 20* 21 - 52 % Final    MONOCYTES 06/20/2019 11* 3 - 10 % Final    EOSINOPHILS 06/20/2019 5  0 - 5 % Final    BASOPHILS 06/20/2019 1  0 - 2 % Final    ABS. NEUTROPHILS 06/20/2019 5.0  1.8 - 8.0 K/UL Final    ABS. LYMPHOCYTES 06/20/2019 1.6  0.9 - 3.6 K/UL Final    ABS. MONOCYTES 06/20/2019 0.9  0.05 - 1.2 K/UL Final    ABS. EOSINOPHILS 06/20/2019 0.4  0.0 - 0.4 K/UL Final    ABS. BASOPHILS 06/20/2019 0.0  0.0 - 0.1 K/UL Final    DF 06/20/2019 AUTOMATED    Final    Sodium 06/20/2019 137  136 - 145 mmol/L Final    Potassium 06/20/2019 4.9  3.5 - 5.5 mmol/L Final    Chloride 06/20/2019 103  100 - 108 mmol/L Final    CO2 06/20/2019 26  21 - 32 mmol/L Final    Anion gap 06/20/2019 8  3.0 - 18 mmol/L Final    Glucose 06/20/2019 88  74 - 99 mg/dL Final    BUN 06/20/2019 29* 7.0 - 18 MG/DL Final    Creatinine 06/20/2019 1.25  0.6 - 1.3 MG/DL Final    BUN/Creatinine ratio 06/20/2019 23* 12 - 20   Final    GFR est AA 06/20/2019 48* >60 ml/min/1.73m2 Final    GFR est non-AA 06/20/2019 40* >60 ml/min/1.73m2 Final    Comment: (NOTE)  Estimated GFR is calculated using the Modification of Diet in Renal   Disease (MDRD) Study equation, reported for both  Americans   (GFRAA) and non- Americans (GFRNA), and normalized to 1.73m2   body surface area. The physician must decide which value applies to   the patient. The MDRD study equation should only be used in   individuals age 25 or older.  It has not been validated for the   following: pregnant women, patients with serious comorbid conditions,   or on certain medications, or persons with extremes of body size,   muscle mass, or nutritional status.  Calcium 06/20/2019 8.5  8.5 - 10.1 MG/DL Final    Bilirubin, total 06/20/2019 0.4  0.2 - 1.0 MG/DL Final    ALT (SGPT) 06/20/2019 31  13 - 56 U/L Final    AST (SGOT) 06/20/2019 35  15 - 37 U/L Final    Alk. phosphatase 06/20/2019 56  45 - 117 U/L Final    Protein, total 06/20/2019 6.0* 6.4 - 8.2 g/dL Final    Albumin 06/20/2019 4.0  3.4 - 5.0 g/dL Final    Globulin 06/20/2019 2.0  2.0 - 4.0 g/dL Final    A-G Ratio 06/20/2019 2.0* 0.8 - 1.7   Final       .  Results for orders placed or performed during the hospital encounter of 06/20/19   CBC WITH AUTOMATED DIFF   Result Value Ref Range    WBC 7.8 4.6 - 13.2 K/uL    RBC 2.21 (L) 4.20 - 5.30 M/uL    HGB 6.7 (L) 12.0 - 16.0 g/dL    HCT 21.4 (L) 35.0 - 45.0 %    MCV 96.8 74.0 - 97.0 FL    MCH 30.3 24.0 - 34.0 PG    MCHC 31.3 31.0 - 37.0 g/dL    RDW 16.0 (H) 11.6 - 14.5 %    PLATELET 976 763 - 260 K/uL    MPV 10.4 9.2 - 11.8 FL    NEUTROPHILS 63 40 - 73 %    LYMPHOCYTES 20 (L) 21 - 52 %    MONOCYTES 11 (H) 3 - 10 %    EOSINOPHILS 5 0 - 5 %    BASOPHILS 1 0 - 2 %    ABS. NEUTROPHILS 5.0 1.8 - 8.0 K/UL    ABS. LYMPHOCYTES 1.6 0.9 - 3.6 K/UL    ABS. MONOCYTES 0.9 0.05 - 1.2 K/UL    ABS. EOSINOPHILS 0.4 0.0 - 0.4 K/UL    ABS.  BASOPHILS 0.0 0.0 - 0.1 K/UL    DF AUTOMATED     METABOLIC PANEL, COMPREHENSIVE   Result Value Ref Range    Sodium 137 136 - 145 mmol/L    Potassium 4.9 3.5 - 5.5 mmol/L    Chloride 103 100 - 108 mmol/L    CO2 26 21 - 32 mmol/L    Anion gap 8 3.0 - 18 mmol/L    Glucose 88 74 - 99 mg/dL    BUN 29 (H) 7.0 - 18 MG/DL    Creatinine 1.25 0.6 - 1.3 MG/DL    BUN/Creatinine ratio 23 (H) 12 - 20      GFR est AA 48 (L) >60 ml/min/1.73m2    GFR est non-AA 40 (L) >60 ml/min/1.73m2    Calcium 8.5 8.5 - 10.1 MG/DL    Bilirubin, total 0.4 0.2 - 1.0 MG/DL    ALT (SGPT) 31 13 - 56 U/L    AST (SGOT) 35 15 - 37 U/L    Alk. phosphatase 56 45 - 117 U/L    Protein, total 6.0 (L) 6.4 - 8.2 g/dL    Albumin 4.0 3.4 - 5.0 g/dL    Globulin 2.0 2.0 - 4.0 g/dL    A-G Ratio 2.0 (H) 0.8 - 1.7         Assessment / Plan      ICD-10-CM ICD-9-CM    1. Contusion of scalp, subsequent encounter S00. 03XD V58.89 CBC WITH AUTOMATED DIFF     407 METABOLIC PANEL, COMPREHENSIVE   2. Essential hypertension, benign I10 401.1 CBC WITH AUTOMATED DIFF      METABOLIC PANEL, COMPREHENSIVE   3. Coronary artery disease involving native coronary artery of native heart without angina pectoris I25.10 414.01 CBC WITH AUTOMATED DIFF      METABOLIC PANEL, COMPREHENSIVE   4. Fall, subsequent encounter W19. XXXD V58.89      E888.9    5. Overactive bladder N32.81 596.51        I expressed my concerns the patient the risks that the falls posed to her. I strongly encouraged the patient to make the home environment as safe as possible. I am aware of her age and general condition and the achy general condition of her partner living in a house. I strongly recommended that they consider either having hired help at the house or considers to assisted living. The patient understands my rationale for the recommendation that she is not increased pain make any changes. I did notify the patient that if the situation appears to be unsafe that I am obligated to contact adult protective agency for [place]. We will discontinue Detrol. This may be contributing some trouble with balance. I warned the patient the urinary frequency may get worse but less is intolerable that she is much safer off the Detrol  she was advised to continue her maintenance medications  The patient return early next week for suture removal      Follow-up and Dispositions    · Return in about 4 days (around 6/24/2019). I asked Esperanza Sesay if she has any questions and I answered the questions. Esperanza Sesay states that she understands the treatment plan and agrees with the treatment plan          THIS DOCUMENT WAS CREATED WITH A VOICE ACTIVATED DICTATION SYSTEM.   IT MAY CONTAIN TRANSCRIPTION ERRORS

## 2019-06-24 ENCOUNTER — HOSPITAL ENCOUNTER (OUTPATIENT)
Dept: LAB | Age: 84
Discharge: HOME | End: 2019-06-24
Payer: MEDICARE

## 2019-06-24 ENCOUNTER — OFFICE VISIT (OUTPATIENT)
Dept: FAMILY MEDICINE CLINIC | Facility: CLINIC | Age: 84
End: 2019-06-24

## 2019-06-24 VITALS
SYSTOLIC BLOOD PRESSURE: 160 MMHG | OXYGEN SATURATION: 98 % | DIASTOLIC BLOOD PRESSURE: 60 MMHG | WEIGHT: 107 LBS | TEMPERATURE: 97.9 F | HEART RATE: 58 BPM | HEIGHT: 60 IN | BODY MASS INDEX: 21.01 KG/M2

## 2019-06-24 DIAGNOSIS — D64.9 ANEMIA, UNSPECIFIED TYPE: Primary | ICD-10-CM

## 2019-06-24 DIAGNOSIS — D64.9 ANEMIA, UNSPECIFIED TYPE: ICD-10-CM

## 2019-06-24 LAB — FERRITIN SERPL-MCNC: 100 NG/ML (ref 8–388)

## 2019-06-24 PROCEDURE — 82728 ASSAY OF FERRITIN: CPT

## 2019-06-24 PROCEDURE — 36415 COLL VENOUS BLD VENIPUNCTURE: CPT

## 2019-06-24 PROCEDURE — 83921 ORGANIC ACID SINGLE QUANT: CPT

## 2019-06-24 NOTE — PROGRESS NOTES
Emi Popeye presents today for   Chief Complaint   Patient presents with    Well Woman     F/U       Esperanza Reyes preferred language for health care discussion is english. Is someone accompanying this pt? no    Is the patient using any DME equipment during OV? no    Depression Screening:  No flowsheet data found. Learning Assessment:  Learning Assessment 3/10/2015   PRIMARY LEARNER Patient   HIGHEST LEVEL OF EDUCATION - PRIMARY LEARNER  GRADUATED HIGH SCHOOL OR GED   BARRIERS PRIMARY LEARNER NONE   CO-LEARNER CAREGIVER No   PRIMARY LANGUAGE ENGLISH    NEED No   LEARNER PREFERENCE PRIMARY DEMONSTRATION   ANSWERED BY PATIENT   RELATIONSHIP SELF       Abuse Screening:  No flowsheet data found. Fall Risk  Fall Risk Assessment, last 12 mths 6/20/2019   Able to walk? Yes   Fall in past 12 months? Yes   Number of falls in past 12 months 4       Health Maintenance reviewed and discussed and ordered per Provider. Health Maintenance Due   Topic Date Due    Shingrix Vaccine Age 49> (1 of 2) 12/22/1973    GLAUCOMA SCREENING Q2Y  12/22/1988    Bone Densitometry (Dexa) Screening  12/22/1988    MEDICARE YEARLY EXAM  03/14/2018   . Emi Nye is updated on all     Pt currently taking Antiplatelet therapy? no    Coordination of Care:  1. Have you been to the ER, urgent care clinic since your last visit? non Hospitalized since your last visit? on    2. Have you seen or consulted any other health care providers outside of the [address] [place] [address] since your last visit? no Include any pap smears or colon screening.  no

## 2019-06-26 LAB
Lab: NORMAL
METHYLMALONATE SERPL-SCNC: 299 NMOL/L (ref 0–378)

## 2019-06-27 NOTE — PROGRESS NOTES
On the recent lab work the patient is found to be quite anemic with a hemoglobin of 6.7. The patient is adamant that the amount of blood she has lost with her recent laceration could explain severe anemia. (Hemoglobin not checked in emergency room). I discussed iron at possibility of iron replacement versus transfusion. The patient states she is currently on over-the-counter iron. I had a long discussion with patient regarding the significance of the anemia. Labs are ordered today --a ferritin level and methylmalonic acid it was given to her anemia and problems with balance. Further decisions be made once these levels are returned. I instructed patient to seek immediate help if she develops chest pain, shortness of breath, or worsening weakness  I asked Esperanza Viramontes if she has any questions and I answered the questions. Esperanza Viramontes states that she understands the treatment plan and agrees with the treatment plan  A total of 10 minutes was spent with the patient.  Greater than 50% of this time was spent in discussion, counseling the patient, and coordinating the care regarding the problem of anemia of uncertain source which may be related to recent blood loss

## 2019-07-07 RX ORDER — ATORVASTATIN CALCIUM 40 MG/1
TABLET, FILM COATED ORAL
Qty: 53 TAB | Refills: 0 | Status: SHIPPED | OUTPATIENT
Start: 2019-07-07 | End: 2019-08-19 | Stop reason: SDUPTHER

## 2019-07-07 RX ORDER — METOPROLOL SUCCINATE 50 MG/1
TABLET, EXTENDED RELEASE ORAL
Qty: 30 TAB | Refills: 0 | Status: SHIPPED | OUTPATIENT
Start: 2019-07-07 | End: 2019-08-26 | Stop reason: SDUPTHER

## 2019-07-08 RX ORDER — ESCITALOPRAM OXALATE 5 MG/1
TABLET ORAL
Qty: 90 TAB | Refills: 0 | Status: SHIPPED | OUTPATIENT
Start: 2019-07-08 | End: 2019-10-18 | Stop reason: SDUPTHER

## 2019-07-15 ENCOUNTER — OFFICE VISIT (OUTPATIENT)
Dept: FAMILY MEDICINE CLINIC | Facility: CLINIC | Age: 84
End: 2019-07-15

## 2019-07-15 ENCOUNTER — HOSPITAL ENCOUNTER (OUTPATIENT)
Dept: LAB | Age: 84
Discharge: HOME | End: 2019-07-15
Payer: MEDICARE

## 2019-07-15 VITALS
HEIGHT: 60 IN | TEMPERATURE: 97.2 F | HEART RATE: 58 BPM | DIASTOLIC BLOOD PRESSURE: 70 MMHG | BODY MASS INDEX: 21.79 KG/M2 | WEIGHT: 111 LBS | SYSTOLIC BLOOD PRESSURE: 174 MMHG | OXYGEN SATURATION: 98 %

## 2019-07-15 DIAGNOSIS — R60.0 BILATERAL LEG EDEMA: Primary | ICD-10-CM

## 2019-07-15 DIAGNOSIS — I10 ESSENTIAL HYPERTENSION, BENIGN: ICD-10-CM

## 2019-07-15 DIAGNOSIS — D64.9 ANEMIA, UNSPECIFIED TYPE: ICD-10-CM

## 2019-07-15 DIAGNOSIS — R60.0 BILATERAL LEG EDEMA: ICD-10-CM

## 2019-07-15 LAB
ALBUMIN SERPL-MCNC: 3.8 G/DL (ref 3.4–5)
ALBUMIN/GLOB SERPL: 1.4 {RATIO} (ref 0.8–1.7)
ALP SERPL-CCNC: 75 U/L (ref 45–117)
ALT SERPL-CCNC: 24 U/L (ref 13–56)
ANION GAP SERPL CALC-SCNC: 6 MMOL/L (ref 3–18)
AST SERPL-CCNC: 31 U/L (ref 15–37)
BASOPHILS # BLD: 0.1 K/UL (ref 0–0.1)
BASOPHILS NFR BLD: 1 % (ref 0–2)
BILIRUB SERPL-MCNC: 0.2 MG/DL (ref 0.2–1)
BUN SERPL-MCNC: 21 MG/DL (ref 7–18)
BUN/CREAT SERPL: 20 (ref 12–20)
CALCIUM SERPL-MCNC: 8.7 MG/DL (ref 8.5–10.1)
CHLORIDE SERPL-SCNC: 104 MMOL/L (ref 100–108)
CO2 SERPL-SCNC: 29 MMOL/L (ref 21–32)
CREAT SERPL-MCNC: 1.04 MG/DL (ref 0.6–1.3)
DIFFERENTIAL METHOD BLD: ABNORMAL
EOSINOPHIL # BLD: 0.5 K/UL (ref 0–0.4)
EOSINOPHIL NFR BLD: 8 % (ref 0–5)
ERYTHROCYTE [DISTWIDTH] IN BLOOD BY AUTOMATED COUNT: 15.9 % (ref 11.6–14.5)
GLOBULIN SER CALC-MCNC: 2.7 G/DL (ref 2–4)
GLUCOSE SERPL-MCNC: 90 MG/DL (ref 74–99)
HCT VFR BLD AUTO: 28.8 % (ref 35–45)
HGB BLD-MCNC: 8.7 G/DL (ref 12–16)
LYMPHOCYTES # BLD: 1.5 K/UL (ref 0.9–3.6)
LYMPHOCYTES NFR BLD: 24 % (ref 21–52)
MCH RBC QN AUTO: 30.3 PG (ref 24–34)
MCHC RBC AUTO-ENTMCNC: 30.2 G/DL (ref 31–37)
MCV RBC AUTO: 100.3 FL (ref 74–97)
MONOCYTES # BLD: 0.7 K/UL (ref 0.05–1.2)
MONOCYTES NFR BLD: 12 % (ref 3–10)
NEUTS SEG # BLD: 3.4 K/UL (ref 1.8–8)
NEUTS SEG NFR BLD: 55 % (ref 40–73)
PLATELET # BLD AUTO: 240 K/UL (ref 135–420)
PMV BLD AUTO: 10.2 FL (ref 9.2–11.8)
POTASSIUM SERPL-SCNC: 4.5 MMOL/L (ref 3.5–5.5)
PROT SERPL-MCNC: 6.5 G/DL (ref 6.4–8.2)
RBC # BLD AUTO: 2.87 M/UL (ref 4.2–5.3)
SODIUM SERPL-SCNC: 139 MMOL/L (ref 136–145)
WBC # BLD AUTO: 6.2 K/UL (ref 4.6–13.2)

## 2019-07-15 PROCEDURE — 80053 COMPREHEN METABOLIC PANEL: CPT

## 2019-07-15 PROCEDURE — 85025 COMPLETE CBC W/AUTO DIFF WBC: CPT

## 2019-07-15 RX ORDER — BUMETANIDE 0.5 MG/1
TABLET ORAL
Qty: 30 TAB | Refills: 0 | Status: SHIPPED | OUTPATIENT
Start: 2019-07-15 | End: 2019-09-24

## 2019-07-15 NOTE — PROGRESS NOTES
Patient presents for lab draw ordered by:    Ordering Provider:  Dr. Jazlyn Berrios Department/Practice:  [facility]  Phone:  [phone]  Date Ordered:  7/15/19    The following labs were drawn and sent to [facility] by Yon Hogue LPN:    CBC and CMP    The following tubes were sent:    Gold  ( 1) and Lavender  ( 1)    R Antecubital cleansed with aseptic technique. Specimen obtained by using a 23 gauge butterfly needle with 1 attempt. Patient tolerated well and voiced no complaints.

## 2019-07-15 NOTE — PROGRESS NOTES
Kenneth Lema presents today for   Chief Complaint   Patient presents with    Well Woman    Ankle swelling     bilateral       Esperanza A Amy preferred language for health care discussion is english. Is someone accompanying this pt? no    Is the patient using any DME equipment during [address]? no    Depression Screening:  3 most recent PHQ Screens 7/15/2019   Little interest or pleasure in doing things Not at all   Feeling down, depressed, irritable, or hopeless Not at all   Total Score PHQ 2 0       Learning Assessment:  Learning Assessment 3/10/2015   PRIMARY LEARNER Patient   HIGHEST LEVEL OF EDUCATION - PRIMARY LEARNER  GRADUATED HIGH SCHOOL OR GED   BARRIERS PRIMARY LEARNER NONE   CO-LEARNER CAREGIVER No   PRIMARY LANGUAGE ENGLISH    NEED No   LEARNER PREFERENCE PRIMARY DEMONSTRATION   ANSWERED BY PATIENT   RELATIONSHIP SELF       Abuse Screening:  No flowsheet data found. Fall Risk  Fall Risk Assessment, last 12 mths 7/15/2019   Able to walk? Yes   Fall in past 12 months? No   Number of falls in past 12 months -       Health Maintenance reviewed and discussed and ordered per Provider. Health Maintenance Due   Topic Date Due    Shingrix Vaccine Age 49> (1 of 2) 12/22/1973    GLAUCOMA SCREENING Q2Y  12/22/1988    Bone Densitometry (Dexa) Screening  12/22/1988    MEDICARE YEARLY EXAM  03/14/2018   . Kenneth Lema is updated on all     Pt currently taking Antiplatelet therapy? Yes plavix, aspirin    Coordination of Care:  1. Have you been to the ER, urgent care clinic since your last visit? no Hospitalized since your last visit? no    2. Have you seen or consulted any other health care providers outside of the [address] [place] [address] since your last visit? no Include any pap smears or colon screening.  no

## 2019-07-16 ENCOUNTER — TELEPHONE (OUTPATIENT)
Dept: FAMILY MEDICINE CLINIC | Facility: CLINIC | Age: 84
End: 2019-07-16

## 2019-07-16 NOTE — TELEPHONE ENCOUNTER
Requested Prescriptions     Pending Prescriptions Disp Refills    tolterodine ER (DETROL LA) 4 mg ER capsule 30 Cap 1     Si tablet daily - - -

## 2019-07-17 NOTE — PROGRESS NOTES
The patient presents to the office today with the chief complaint of LE swelling    HPI    The patient complains of one week of swelling of her legs. She has mild dyspnea on exertion which has not changed. The patient is known to have anemia. She is on iron. She is due for a recheck of her blood count. Review of Systems   Respiratory: Positive for shortness of breath (Mild anemia). Cardiovascular: Positive for leg swelling. Negative for chest pain. Allergies   Allergen Reactions    Viibryd [Vilazodone] Anaphylaxis and Vertigo    Coreg [Carvedilol] Other (comments)     Pt states coreg makes her feel like a \"zombie\"    Erythromycin Nausea Only     Severe.  Penicillins Hives       Current Outpatient Medications   Medication Sig Dispense Refill    bumetanide (BUMEX) 0.5 mg tablet 1 tab each AM 30 Tab 0    escitalopram oxalate (LEXAPRO) 5 mg tablet TAKE ONE-HALF TABLET TO ONE TABLET BY MOUTH DAILY 90 Tab 0    atorvastatin (LIPITOR) 40 mg tablet TAKE ONE TABLET BY MOUTH DAILY 53 Tab 0    metoprolol succinate (TOPROL-XL) 50 mg XL tablet TAKE ONE TABLET BY MOUTH DAILY 30 Tab 0    COQ10, UBIQUINOL, PO Take 100 mg by mouth daily.  tolterodine ER (DETROL LA) 4 mg ER capsule TAKE ONE CAPSULE BY MOUTH DAILY 30 Cap 3    losartan (COZAAR) 50 mg tablet TAKE ONE TABLET BY MOUTH TWICE A  Tab 3    meclizine (ANTIVERT) 12.5 mg tablet Take 1 Tab by mouth two (2) times daily as needed. 40 Tab 2    clopidogrel (PLAVIX) 75 mg tab TAKE ONE TABLET BY MOUTH DAILY 90 Tab 0    ferrous sulfate (IRON) 325 mg (65 mg iron) tablet Take 1 Tab by mouth Daily (before breakfast). 30 Tab 2    aspirin 81 mg tablet Take 81 mg by mouth daily. Past Medical History:   Diagnosis Date    Cardiac ambulatory blood pressure monitoring 10/25/2011    JNC-7 classification:  Stage 1 hypertension.  Cardiac cath 02/22/2010    mRCA 35%. LM patent. Cx 25%. pLAD 100% (2.75 x 23 mm Cypher stent) LVEDP  20.  EF 45%. Anteroapical mod HK    Cardiac echocardiogram 05/11/2010    EF 60%. Gr 2 DDfx. LAE. Mild MR.  Mild-mod PI; PASP 30 mmHg.  Carotid duplex 08/26/2013    Mild 1-49% bilateral ICA stenosis. Biphasic signals in both subclavian arteries.  Coronary artery disease     acute anterior ST-elevation myocardial infarction/primary stenting of the LAD on 02/22/10/EF 45%.  Coronary atherosclerosis of unspecified type of bypass graft(414.05)     Depressive disorder     Fatigue     Headache(784.0)     Heart disease, unspecified     Iron deficiency anemia, unspecified     Migraine     Migraine headache     Myocardial infarction ([facility] [address] 02/22/2010    Acute anterior wall w/primary intubation, stenting of prox LAD w/ 2.75-mm Cypher stent. 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  Senile cataract, unspecified     Valvular heart disease     Mitral valve prolapse. Past Surgical History:   Procedure Laterality Date    HX APPENDECTOMY      HX BREAST LUMPECTOMY      Left.  HX CATARACT REMOVAL  5/1/2013, 5/8/2013    left and right    HX CORONARY STENT PLACEMENT  2/22/10    prox LAD w/ 2.75-mm Cypher stent.     HX HEART CATHETERIZATION      HX TONSILLECTOMY         Social History     Socioeconomic History    Marital status:      Spouse name: Not on file    Number of children: Not on file    Years of education: Not on file    Highest education level: Not on file   Occupational History    Not on file   Social Needs    Financial resource strain: Not on file    Food insecurity:     Worry: Not on file     Inability: Not on file    Transportation needs:     Medical: Not on file     Non-medical: Not on file   Tobacco Use    Smoking status: Never Smoker    Smokeless tobacco: Never Used   Substance and Sexual Activity    Alcohol use: No    Drug use: No    Sexual activity: Never     Partners: Male   Lifestyle    Physical activity:     Days per week: Not on file     Minutes per session: Not on file    Stress: Not on file   Relationships    Social connections:     Talks on phone: Not on file     Gets together: Not on file     Attends Church service: Not on file     Active member of club or organization: Not on file     Attends meetings of clubs or organizations: Not on file     Relationship status: Not on file    Intimate partner violence:     Fear of current or ex partner: Not on file     Emotionally abused: Not on file     Physically abused: Not on file     Forced sexual activity: Not on file   Other Topics Concern    Not on file   Social History Narrative    Not on file       Patient does not have an advanced directive on file    Visit Vitals  /70 (BP 1 Location: Left arm, BP Patient Position: Sitting)   Pulse (!) 58   Temp 97.2 °F (36.2 °C) (Tympanic)   Ht 5' (1.524 m)   Wt 111 lb (50.3 kg)   SpO2 98%   BMI 21.68 kg/m²       Physical Exam   Neck: Carotid bruit is not present. Cardiovascular: Normal rate and regular rhythm. Exam reveals no gallop. No murmur heard. Pulmonary/Chest: She has no wheezes. She has no rales. Abdominal: Soft. She exhibits no distension. There is no tenderness. Musculoskeletal: She exhibits edema (2+ doughy edema bilaterally).        BMI:  [facility] Outpatient Visit on 07/15/2019   Component Date Value Ref Range Status    Sodium 07/15/2019 139  136 - 145 mmol/L Final    Potassium 07/15/2019 4.5  3.5 - 5.5 mmol/L Final    Chloride 07/15/2019 104  100 - 108 mmol/L Final    CO2 07/15/2019 29  21 - 32 mmol/L Final    Anion gap 07/15/2019 6  3.0 - 18 mmol/L Final    Glucose 07/15/2019 90  74 - 99 mg/dL Final    BUN 07/15/2019 21* 7.0 - 18 MG/DL Final    Creatinine 07/15/2019 1.04  0.6 - 1.3 MG/DL Final    BUN/Creatinine ratio 07/15/2019 20  12 - 20   Final    GFR est AA 07/15/2019 60* >60 ml/min/1.73m2 Final    GFR est non-AA 07/15/2019 49* >60 ml/min/1.73m2 Final    Comment: (NOTE)  Estimated GFR is calculated using the Modification of Diet in Renal   Disease (MDRD) Study equation, reported for both  Americans   (GFRAA) and non- Americans (GFRNA), and normalized to 1.73m2   body surface area. The physician must decide which value applies to   the patient. The MDRD study equation should only be used in   individuals age 25 or older. It has not been validated for the   following: pregnant women, patients with serious comorbid conditions,   or on certain medications, or persons with extremes of body size,   muscle mass, or nutritional status.  Calcium 07/15/2019 8.7  8.5 - 10.1 MG/DL Final    Bilirubin, total 07/15/2019 0.2  0.2 - 1.0 MG/DL Final    ALT (SGPT) 07/15/2019 24  13 - 56 U/L Final    AST (SGOT) 07/15/2019 31  15 - 37 U/L Final    Alk. phosphatase 07/15/2019 75  45 - 117 U/L Final    Protein, total 07/15/2019 6.5  6.4 - 8.2 g/dL Final    Albumin 07/15/2019 3.8  3.4 - 5.0 g/dL Final    Globulin 07/15/2019 2.7  2.0 - 4.0 g/dL Final    A-G Ratio 07/15/2019 1.4  0.8 - 1.7   Final    WBC 07/15/2019 6.2  4.6 - 13.2 K/uL Final    RBC 07/15/2019 2.87* 4.20 - 5.30 M/uL Final    HGB 07/15/2019 8.7* 12.0 - 16.0 g/dL Final    HCT 07/15/2019 28.8* 35.0 - 45.0 % Final    MCV 07/15/2019 100.3* 74.0 - 97.0 FL Final    MCH 07/15/2019 30.3  24.0 - 34.0 PG Final    MCHC 07/15/2019 30.2* 31.0 - 37.0 g/dL Final    RDW 07/15/2019 15.9* 11.6 - 14.5 % Final    PLATELET 92/45/4832 078  135 - 420 K/uL Final    MPV 07/15/2019 10.2  9.2 - 11.8 FL Final    NEUTROPHILS 07/15/2019 55  40 - 73 % Final    LYMPHOCYTES 07/15/2019 24  21 - 52 % Final    MONOCYTES 07/15/2019 12* 3 - 10 % Final    EOSINOPHILS 07/15/2019 8* 0 - 5 % Final    BASOPHILS 07/15/2019 1  0 - 2 % Final    ABS. NEUTROPHILS 07/15/2019 3.4  1.8 - 8.0 K/UL Final    ABS. LYMPHOCYTES 07/15/2019 1.5  0.9 - 3.6 K/UL Final    ABS.  MONOCYTES 07/15/2019 0.7  0.05 - 1.2 K/UL Final    ABS. EOSINOPHILS 07/15/2019 0.5* 0.0 - 0.4 K/UL Final    ABS. BASOPHILS 07/15/2019 0.1  0.0 - 0.1 K/UL Final    DF 07/15/2019 AUTOMATED    Final   Hospital Outpatient Visit on 06/24/2019   Component Date Value Ref Range Status    Ferritin 06/24/2019 100  8 - 388 NG/ML Final    Methylmalonic acid 06/24/2019 299  0 - 378 nmol/L Final    Disclaimer 06/24/2019 Comment    Final    Comment: (NOTE)  This test was developed and its performance characteristics  determined by [facility]. It has not been cleared or approved  by the Food and Drug Administration. Performed At: [address] [id number]   Daisy Brown 97 [id number]     Hospital Outpatient Visit on 06/20/2019   Component Date Value Ref Range Status    WBC 06/20/2019 7.8  4.6 - 13.2 K/uL Final    RBC 06/20/2019 2.21* 4.20 - 5.30 M/uL Final    HGB 06/20/2019 6.7* 12.0 - 16.0 g/dL Final    HCT 06/20/2019 21.4* 35.0 - 45.0 % Final    MCV 06/20/2019 96.8  74.0 - 97.0 FL Final    MCH 06/20/2019 30.3  24.0 - 34.0 PG Final    MCHC 06/20/2019 31.3  31.0 - 37.0 g/dL Final    RDW 06/20/2019 16.0* 11.6 - 14.5 % Final    PLATELET 37/05/1823 743  135 - 420 K/uL Final    MPV 06/20/2019 10.4  9.2 - 11.8 FL Final    NEUTROPHILS 06/20/2019 63  40 - 73 % Final    LYMPHOCYTES 06/20/2019 20* 21 - 52 % Final    MONOCYTES 06/20/2019 11* 3 - 10 % Final    EOSINOPHILS 06/20/2019 5  0 - 5 % Final    BASOPHILS 06/20/2019 1  0 - 2 % Final    ABS. NEUTROPHILS 06/20/2019 5.0  1.8 - 8.0 K/UL Final    ABS. LYMPHOCYTES 06/20/2019 1.6  0.9 - 3.6 K/UL Final    ABS. MONOCYTES 06/20/2019 0.9  0.05 - 1.2 K/UL Final    ABS. EOSINOPHILS 06/20/2019 0.4  0.0 - 0.4 K/UL Final    ABS.  BASOPHILS 06/20/2019 0.0  0.0 - 0.1 K/UL Final    DF 06/20/2019 AUTOMATED    Final    Sodium 06/20/2019 137  136 - 145 mmol/L Final    Potassium 06/20/2019 4.9  3.5 - 5.5 mmol/L Final    Chloride 06/20/2019 103  100 - 108 mmol/L Final    CO2 06/20/2019 26  21 - 32 mmol/L Final    Anion gap 06/20/2019 8  3.0 - 18 mmol/L Final    Glucose 06/20/2019 88  74 - 99 mg/dL Final    BUN 06/20/2019 29* 7.0 - 18 MG/DL Final    Creatinine 06/20/2019 1.25  0.6 - 1.3 MG/DL Final    BUN/Creatinine ratio 06/20/2019 23* 12 - 20   Final    GFR est AA 06/20/2019 48* >60 ml/min/1.73m2 Final    GFR est non-AA 06/20/2019 40* >60 ml/min/1.73m2 Final    Comment: (NOTE)  Estimated GFR is calculated using the Modification of Diet in Renal   Disease (MDRD) Study equation, reported for both  Americans   (GFRAA) and non- Americans (GFRNA), and normalized to 1.73m2   body surface area. The physician must decide which value applies to   the patient. The MDRD study equation should only be used in   individuals age 25 or older. It has not been validated for the   following: pregnant women, patients with serious comorbid conditions,   or on certain medications, or persons with extremes of body size,   muscle mass, or nutritional status.  Calcium 06/20/2019 8.5  8.5 - 10.1 MG/DL Final    Bilirubin, total 06/20/2019 0.4  0.2 - 1.0 MG/DL Final    ALT (SGPT) 06/20/2019 31  13 - 56 U/L Final    AST (SGOT) 06/20/2019 35  15 - 37 U/L Final    Alk.  phosphatase 06/20/2019 56  45 - 117 U/L Final    Protein, total 06/20/2019 6.0* 6.4 - 8.2 g/dL Final    Albumin 06/20/2019 4.0  3.4 - 5.0 g/dL Final    Globulin 06/20/2019 2.0  2.0 - 4.0 g/dL Final    A-G Ratio 06/20/2019 2.0* 0.8 - 1.7   Final       .  Results for orders placed or performed during the hospital encounter of 07/53/87   METABOLIC PANEL, COMPREHENSIVE   Result Value Ref Range    Sodium 139 136 - 145 mmol/L    Potassium 4.5 3.5 - 5.5 mmol/L    Chloride 104 100 - 108 mmol/L    CO2 29 21 - 32 mmol/L    Anion gap 6 3.0 - 18 mmol/L    Glucose 90 74 - 99 mg/dL    BUN 21 (H) 7.0 - 18 MG/DL    Creatinine 1.04 0.6 - 1.3 MG/DL    BUN/Creatinine ratio 20 12 - 20      GFR est AA 60 (L) >60 ml/min/1.73m2    GFR est non-AA 49 (L) >60 ml/min/1.73m2    Calcium 8.7 8.5 - 10.1 MG/DL    Bilirubin, total 0.2 0.2 - 1.0 MG/DL    ALT (SGPT) 24 13 - 56 U/L    AST (SGOT) 31 15 - 37 U/L    Alk. phosphatase 75 45 - 117 U/L    Protein, total 6.5 6.4 - 8.2 g/dL    Albumin 3.8 3.4 - 5.0 g/dL    Globulin 2.7 2.0 - 4.0 g/dL    A-G Ratio 1.4 0.8 - 1.7     CBC WITH AUTOMATED DIFF   Result Value Ref Range    WBC 6.2 4.6 - 13.2 K/uL    RBC 2.87 (L) 4.20 - 5.30 M/uL    HGB 8.7 (L) 12.0 - 16.0 g/dL    HCT 28.8 (L) 35.0 - 45.0 %    .3 (H) 74.0 - 97.0 FL    MCH 30.3 24.0 - 34.0 PG    MCHC 30.2 (L) 31.0 - 37.0 g/dL    RDW 15.9 (H) 11.6 - 14.5 %    PLATELET 845 319 - 737 K/uL    MPV 10.2 9.2 - 11.8 FL    NEUTROPHILS 55 40 - 73 %    LYMPHOCYTES 24 21 - 52 %    MONOCYTES 12 (H) 3 - 10 %    EOSINOPHILS 8 (H) 0 - 5 %    BASOPHILS 1 0 - 2 %    ABS. NEUTROPHILS 3.4 1.8 - 8.0 K/UL    ABS. LYMPHOCYTES 1.5 0.9 - 3.6 K/UL    ABS. MONOCYTES 0.7 0.05 - 1.2 K/UL    ABS. EOSINOPHILS 0.5 (H) 0.0 - 0.4 K/UL    ABS. BASOPHILS 0.1 0.0 - 0.1 K/UL    DF AUTOMATED         Assessment / Plan      ICD-10-CM ICD-9-CM    1. Bilateral leg edema H90.9 768.1 METABOLIC PANEL, COMPREHENSIVE   2. Anemia, unspecified type H92.4 546.8 METABOLIC PANEL, COMPREHENSIVE      CBC WITH AUTOMATED DIFF   3. Essential hypertension, benign B20 818.6 METABOLIC PANEL, COMPREHENSIVE       CBC, CMP ordered  Bumex 0.5 mg daily    Follow-up and Dispositions    · Return in about 3 weeks (around 8/5/2019). I asked Esperanza Garvin if she has any questions and I answered the questions. Esperanza Garvin states that she understands the treatment plan and agrees with the treatment plan          THIS DOCUMENT WAS CREATED WITH A VOICE ACTIVATED DICTATION SYSTEM.   IT MAY CONTAIN TRANSCRIPTION ERRORS

## 2019-07-18 ENCOUNTER — TELEPHONE (OUTPATIENT)
Dept: FAMILY MEDICINE CLINIC | Facility: CLINIC | Age: 84
End: 2019-07-18

## 2019-07-18 DIAGNOSIS — R60.0 BILATERAL LEG EDEMA: Primary | ICD-10-CM

## 2019-07-22 ENCOUNTER — TELEPHONE (OUTPATIENT)
Dept: FAMILY MEDICINE CLINIC | Facility: CLINIC | Age: 84
End: 2019-07-22

## 2019-07-29 ENCOUNTER — OFFICE VISIT (OUTPATIENT)
Dept: CARDIOLOGY CLINIC | Age: 84
End: 2019-07-29

## 2019-07-29 VITALS
DIASTOLIC BLOOD PRESSURE: 60 MMHG | WEIGHT: 111 LBS | HEART RATE: 68 BPM | OXYGEN SATURATION: 99 % | BODY MASS INDEX: 21.79 KG/M2 | HEIGHT: 60 IN | SYSTOLIC BLOOD PRESSURE: 130 MMHG

## 2019-07-29 DIAGNOSIS — R42 DIZZINESS: ICD-10-CM

## 2019-07-29 DIAGNOSIS — I10 ESSENTIAL HYPERTENSION, BENIGN: ICD-10-CM

## 2019-07-29 DIAGNOSIS — I25.10 ATHEROSCLEROSIS OF NATIVE CORONARY ARTERY OF NATIVE HEART WITHOUT ANGINA PECTORIS: Primary | ICD-10-CM

## 2019-07-29 DIAGNOSIS — R09.89 LEFT CAROTID BRUIT: ICD-10-CM

## 2019-07-29 DIAGNOSIS — H34.9 RETINAL EMBOLI, RIGHT: ICD-10-CM

## 2019-07-29 RX ORDER — LANOLIN ALCOHOL/MO/W.PET/CERES
1000 CREAM (GRAM) TOPICAL DAILY
COMMUNITY

## 2019-07-29 RX ORDER — OMEPRAZOLE 40 MG/1
40 CAPSULE, DELAYED RELEASE ORAL DAILY
COMMUNITY

## 2019-07-29 RX ORDER — MELATONIN
DAILY
COMMUNITY

## 2019-07-29 NOTE — PROGRESS NOTES
Altagracia Montoya presents today for   Chief Complaint   Patient presents with    Coronary Artery Disease     6 month        Esperanza Reyes preferred language for health care discussion is english/other. Is someone accompanying this pt? no    Is the patient using any DME equipment during [address]? no    Depression Screening:  3 most recent PHQ Screens 7/15/2019   Little interest or pleasure in doing things Not at all   Feeling down, depressed, irritable, or hopeless Not at all   Total Score PHQ 2 0       Learning Assessment:  Learning Assessment 3/10/2015   PRIMARY LEARNER Patient   HIGHEST LEVEL OF EDUCATION - PRIMARY LEARNER  GRADUATED HIGH SCHOOL OR GED   BARRIERS PRIMARY LEARNER NONE   CO-LEARNER CAREGIVER No   PRIMARY LANGUAGE ENGLISH    NEED No   LEARNER PREFERENCE PRIMARY DEMONSTRATION   ANSWERED BY PATIENT   RELATIONSHIP SELF       Abuse Screening:  Abuse Screening Questionnaire 7/29/2019   Do you ever feel afraid of your partner? N   Are you in a relationship with someone who physically or mentally threatens you? N   Is it safe for you to go home? Y       Fall Risk  Fall Risk Assessment, last 12 mths 7/29/2019   Able to walk? Yes   Fall in past 12 months? Yes   Fall with injury? Yes   Number of falls in past 12 months 2   Fall Risk Score 3       Pt currently taking Anticoagulant therapy? yes    Coordination of Care:  1. Have you been to the ER, urgent care clinic since your last visit? Hospitalized since your last visit? yes    2. Have you seen or consulted any other health care providers outside of the [address] [place] [address] since your last visit? Include any pap smears or colon screening.  no

## 2019-07-29 NOTE — PATIENT INSTRUCTIONS
Stop Plavix      Preventing Falls: Care Instructions  Your Care Instructions    Getting around your home safely can be a challenge if you have injuries or health problems that make it easy for you to fall. Loose rugs and furniture in walkways are among the dangers for many older people who have problems walking or who have poor eyesight. People who have conditions such as arthritis, osteoporosis, or dementia also have to be careful not to fall. You can make your home safer with a few simple measures. Follow-up care is a key part of your treatment and safety. Be sure to make and go to all appointments, and call your doctor if you are having problems. It's also a good idea to know your test results and keep a list of the medicines you take. How can you care for yourself at home? Taking care of yourself  · You may get dizzy if you do not drink enough water. To prevent dehydration, drink plenty of fluids, enough so that your urine is light yellow or clear like water. Choose water and other caffeine-free clear liquids. If you have kidney, heart, or liver disease and have to limit fluids, talk with your doctor before you increase the amount of fluids you drink. · Exercise regularly to improve your strength, muscle tone, and balance. Walk if you can. Swimming may be a good choice if you cannot walk easily. · Have your vision and hearing checked each year or any time you notice a change. If you have trouble seeing and hearing, you might not be able to avoid objects and could lose your balance. · Know the side effects of the medicines you take. Ask your doctor or pharmacist whether the medicines you take can affect your balance. Sleeping pills or sedatives can affect your balance. · Limit the amount of alcohol you drink. Alcohol can impair your balance and other senses. · Ask your doctor whether calluses or corns on your feet need to be removed.  If you wear loose-fitting shoes because of calluses or corns, you can lose your balance and fall. · Talk to your doctor if you have numbness in your feet. Preventing falls at home  · Remove raised doorway thresholds, throw rugs, and clutter. Repair loose carpet or raised areas in the floor. · Move furniture and electrical cords to keep them out of walking paths. · Use nonskid floor wax, and wipe up spills right away, especially on ceramic tile floors. · If you use a walker or cane, put rubber tips on it. If you use crutches, clean the bottoms of them regularly with an abrasive pad, such as steel wool. · Keep your house well lit, especially [place], and outside walkways. Use night-lights in areas such as hallways and bathrooms. Add extra light switches or use remote switches (such as switches that go on or off when you clap your hands) to make it easier to turn lights on if you have to get up during the night. · Install sturdy handrails on stairways. · Move items in your cabinets so that the things you use a lot are on the lower shelves (about waist level). · Keep a cordless phone and a flashlight with new batteries by your bed. If possible, put a phone in each of the main rooms of your house, or carry a cell phone in case you fall and cannot reach a phone. Or, you can wear a device around your neck or wrist. You push a button that sends a signal for help. · Wear low-heeled shoes that fit well and give your feet good support. Use footwear with nonskid soles. Check the heels and soles of your shoes for wear. Repair or replace worn heels or soles. · Do not wear socks without shoes on wood floors. · Walk on the grass when the sidewalks are slippery. If you live in an area that gets snow and ice in the winter, sprinkle salt on slippery steps and sidewalks. Preventing falls in the bath  · Install grab bars and nonskid mats inside and outside your shower or tub and near the toilet and sinks. · Use shower chairs and bath benches.   · Use a hand-held shower head that will allow you to sit while showering. · Get into a tub or shower by putting the weaker leg in first. Get out of a tub or shower with your strong side first.  · Repair loose toilet seats and consider installing a raised toilet seat to make getting on and off the toilet easier. · Keep your bathroom door unlocked while you are in the shower. Where can you learn more? Go to http://chelita-catrachito.info/. Enter [id number] [id number] in the search box to learn more about \"Preventing Falls: Care Instructions. \"  Current as of: November 7, 2018  Content Version: 12.1  © 7364-4866 [employer]. Care instructions adapted under license by [employer] (which disclaims liability or warranty for this information). If you have questions about a medical condition or this instruction, always ask your healthcare professional. [place] [address] any warranty or liability for your use of this information. How to Get Up Safely After a Fall: Care Instructions  Your Care Instructions    If you have injuries, health problems, or other reasons that may make it easy for you to fall at home, it is a good idea to learn how to get up safely after a fall. Learning how to get up correctly can help you avoid making an injury worse. Also, knowing what to do if you cannot get up can help you stay safe until help arrives. Follow-up care is a key part of your treatment and safety. Be sure to make and go to all appointments, and call your doctor if you are having problems. It's also a good idea to know your test results and keep a list of the medicines you take. How can you care for yourself after a fall? If you think you can get up  First lie still for a few minutes and think about how you feel. If your body feels okay and you think you can get up safely, follow the rest of the steps below:  1. Look for a chair or other piece of furniture that is close to you.   2. Roll onto your side and rest. Roll by turning your head in the direction you want to roll, move your shoulder and arm, then hip and leg in the same direction. 3. Lie still for a moment to let your blood pressure adjust.  4. Slowly push your upper body up, lift your head, and take a moment to rest.  5. Slowly get up on your hands and knees, and crawl to the chair or other stable piece of furniture. 6. Put your hands on the chair. 7. Move one foot forward, and place it flat on the floor. Your other leg should be bent with the knee on the floor. 8. Rise slowly, turn your body, and sit in the chair. Stay seated for a bit and think about how you feel. Call for help. Even if you feel okay, let someone know what happened to you. You might not know that you have a serious injury. If you cannot get up  1. If you think you are injured after a fall or you cannot get up, try not to panic. 2. Call out for help. 3. If you have a phone within reach or you have an emergency call device, use it to call for help. 4. If you do not have a phone within reach, try to slide yourself toward it. If you cannot get to the phone, try to slide toward a door or window or a place where you think you can be heard. 5. [place] or use an object to make noise so someone might hear you. 6. If you can reach something that you can use for a pillow, place it under your head. Try to stay warm by covering yourself with a blanket or clothing while you wait for help. When should you call for help? Call 911 anytime you think you may need emergency care. For example, call if:    · You passed out (lost consciousness).     · You cannot get up after a fall.     · You have severe pain.    Call your doctor now or seek immediate medical care if:    · You have new or worse pain.     · You are dizzy or lightheaded.     · You hit your head.    Watch closely for changes in your health, and be sure to contact your doctor if:    · You do not get better as expected. Where can you learn more?   Go to http://chelita-catrachito.info/. Enter O834 in the search box to learn more about \"How to Get Up Safely After a Fall: Care Instructions. \"  Current as of: November 7, 2018  Content Version: 12.1  © 7604-3963 [employer]. Care instructions adapted under license by [employer] (which disclaims liability or warranty for this information). If you have questions about a medical condition or this instruction, always ask your healthcare professional. [facility] [address] any warranty or liability for your use of this information. Preventing Outdoor Falls: Care Instructions  Your Care Instructions    Worries about falls don't need to keep you indoors. Outdoor activities like walking have big benefits for your health. You will need to watch your step and learn a few safety measures. If you are worried about having a fall outdoors, ask your doctor about exercises, classes, or physical therapy that may help. You can learn ways to gain strength, flexibility, and balance. Ask if it might help to use a cane or walker. You can make your time outdoors safer with a few simple measures. Follow-up care is a key part of your treatment and safety. Be sure to make and go to all appointments, and call your doctor if you are having problems. It's also a good idea to know your test results and keep a list of the medicines you take. How can you prevent falls outdoors? · Wear shoes with firm soles and low heels. If you have to walk on an icy surface, use grippers that can be worn over your shoes in bad weather. · Be extra careful if weather is bad. Walk on the grass when the sidewalks are slick. If you live in a place that gets snow and ice in the winter, sprinkle salt on slippery stairs and sidewalks. · Be careful getting on or off buses and trains or getting in and out of cars. If handrails are available, use them. · Be careful when you cross the street.  Look for crosswalks or places where curb cuts or ramps are present. · Try not to hurry, especially if you are carrying something. · Be cautious in parking lots or garages. There may be curbs or changes in pavement, or the height of the pavement may vary. · Make sure to wear the correct eyeglasses, if you need them. Reading glasses or bifocals can make it harder to see hazards that might be in your way. · If you are walking outdoors for exercise, try to:  ? Walk in well-lighted, well-maintained areas. These include high school or college tracks, shopping malls, and public spaces. ? Walk with a partner. ? Watch out for cracked sidewalks, curbs, changes in the height of the pavement, exposed tree roots, and debris such as fallen leaves or branches. Where can you learn more? Go to http://chelita-catrachito.info/. Enter Q731 in the search box to learn more about \"Preventing Outdoor Falls: Care Instructions. \"  Current as of: November 7, 2018  Content Version: 12.1  © 9136-7220 [employer]. Care instructions adapted under license by [employer] (which disclaims liability or warranty for this information). If you have questions about a medical condition or this instruction, always ask your healthcare professional. [address] any warranty or liability for your use of this information.

## 2019-07-29 NOTE — PROGRESS NOTES
HISTORY OF PRESENT ILLNESS  Ernesto Maher is a 80 y.o. female. HPI  She came with the multiple facial bruises. She apparently fell and was seen in the Emergency Room with a scalp laceration and massive bleeding. Her H and H was down to 6.7 and 21.4 on 06/20/2019 and it went up to 8.7 and 28.8 on 07/15/2019. She states that she simply fell because of the balance without a fainting spell. She does not seem to have a clear memory though. She is quite different than 6 months ago when she did not have any memory lapse. She denies chest pain, dyspnea, orthopnea or PND. She denies palpitation, dizziness or syncope. She has had no symptoms to indicate TIA or amaurosis fugax. She has had the leg edema and had a venous examination on 07/18/2019 which demonstrated no thrombus. She has a history of mitral valve prolapse and a single isolated episode of loss of central vision of her right eye which was thought to be due to peripheral embolization from the mitral valve prolapse. She was placed on Inderal LA, which was not tolerated well. This was for migraines. Unexpectedly she had acute anterior ST-elevation myocardial infarction on February 22, 2010, and underwent primary intubation. Her cardiac catheterization demonstrated:    1. A 30% to 40% long stenosis of the dominant RCA in the mid segment. 2. Patent left main trunk. 3. A 20% to 30% stenosis of the circumflex coronary artery diffusely. 4. Total occlusion of the LAD in the proximal segment, with faint left-to-left collaterals. 5. Moderate anteroapical hypokinesis of the left ventricle, with EF in the 45% range. The LAD was subsequently intervened upon and stented with a 2.75-mm Cypher stent successfully. She had repeat echocardiogram on May 11, 2010 which demonstrated improved LV function with EF now up to 60%.     She had ambulatory blood pressure recording on 10/25/2011, which demonstrated mild hypertension with mean ambulatory blood pressure recordings of 148/64 on the current medical regimen. Review of Systems   Constitutional: Negative for malaise/fatigue and weight loss. HENT: Negative for hearing loss. Eyes: Negative for blurred vision and double vision. Respiratory: Negative for shortness of breath. Cardiovascular: Positive for leg swelling. Negative for chest pain, palpitations, orthopnea, claudication and PND. Gastrointestinal: Negative for blood in stool, heartburn and melena. Genitourinary: Negative for dysuria, flank pain, frequency, hematuria and urgency. Musculoskeletal: Positive for falls. Negative for back pain and joint pain. Skin: Negative for itching and rash. Neurological: Positive for dizziness. Negative for loss of consciousness. Psychiatric/Behavioral: Positive for memory loss. Negative for depression. Physical Exam   Constitutional: She is oriented to person, place, and time. She appears well-developed and well-nourished. HENT:   Head: Normocephalic and atraumatic. Facial bruises   Eyes: Pupils are equal, round, and reactive to light. Conjunctivae are normal.   Neck: Normal range of motion. Neck supple. No JVD present. Cardiovascular: Normal rate, regular rhythm, S1 normal and S2 normal.  No extrasystoles are present. PMI is not displaced. Exam reveals no gallop and no friction rub. No murmur heard. Pulses:       Carotid pulses are 3+ on the right side with bruit, and 3+ on the left side with bruit. Pulmonary/Chest: Effort normal. She has no rales. Abdominal: Soft. There is no tenderness. Musculoskeletal: She exhibits edema. 2+   Neurological: She is alert and oriented to person, place, and time. No cranial nerve deficit. Skin: Skin is warm and dry. Psychiatric: She has a normal mood and affect.  Her behavior is normal.     Visit Vitals  /60   Pulse 68   Ht 5' (1.524 m)   Wt 50.3 kg (111 lb)   SpO2 99%   BMI 21.68 kg/m²       Past Medical History:   Diagnosis Date    Cardiac ambulatory blood pressure monitoring 10/25/2011    JNC-7 classification:  Stage 1 hypertension.  Cardiac cath 02/22/2010    mRCA 35%. LM patent. Cx 25%. pLAD 100% (2.75 x 23 mm Cypher stent) LVEDP  20.  EF 45%. Anteroapical mod HK    Cardiac echocardiogram 05/11/2010    EF 60%. Gr 2 DDfx. LAE. Mild MR.  Mild-mod PI; PASP 30 mmHg.  Carotid duplex 08/26/2013    Mild 1-49% bilateral ICA stenosis. Biphasic signals in both subclavian arteries.  Coronary artery disease     acute anterior ST-elevation myocardial infarction/primary stenting of the LAD on 02/22/10/EF 45%.  Coronary atherosclerosis of unspecified type of bypass graft(414.05)     Depressive disorder     Fatigue     Headache(784.0)     Heart disease, unspecified     Iron deficiency anemia, unspecified     Migraine     Migraine headache     Myocardial infarction ([facility] [address] 02/22/2010    Acute anterior wall w/primary intubation, stenting of prox LAD w/ 2.75-mm Cypher stent. 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  Senile cataract, unspecified     Valvular heart disease     Mitral valve prolapse.        Social History     Socioeconomic History    Marital status:      Spouse name: Not on file    Number of children: Not on file    Years of education: Not on file    Highest education level: Not on file   Occupational History    Not on file   Social Needs    Financial resource strain: Not on file    Food insecurity:     Worry: Not on file     Inability: Not on file    Transportation needs:     Medical: Not on file     Non-medical: Not on file   Tobacco Use    Smoking status: Never Smoker    Smokeless tobacco: Never Used   Substance and Sexual Activity    Alcohol use: No    Drug use: No    Sexual activity: Never     Partners: Male   Lifestyle    Physical activity:     Days per week: Not on file     Minutes per session: Not on file    Stress: Not on file   Relationships    Social connections:     Talks on phone: Not on file     Gets together: Not on file     Attends [employer] service: Not on file     Active member of club or organization: Not on file     Attends meetings of clubs or organizations: Not on file     Relationship status: Not on file    Intimate partner violence:     Fear of current or ex partner: Not on file     Emotionally abused: Not on file     Physically abused: Not on file     Forced sexual activity: Not on file   Other Topics Concern    Not on file   Social History Narrative    Not on file       Family History   Problem Relation Age of Onset    Heart Disease Mother     Heart Disease Father        Past Surgical History:   Procedure Laterality Date    HX APPENDECTOMY      HX BREAST LUMPECTOMY      Left.  HX CATARACT REMOVAL  5/1/2013, 5/8/2013    left and right    HX CORONARY STENT PLACEMENT  2/22/10    prox LAD w/ 2.75-mm Cypher stent.  HX HEART CATHETERIZATION      HX TONSILLECTOMY         Current Outpatient Medications   Medication Sig Dispense Refill    cholecalciferol (VITAMIN D3) 1,000 unit tablet Take  by mouth daily.  cyanocobalamin (VITAMIN B-12) 1,000 mcg tablet Take 1,000 mcg by mouth daily.  omeprazole (PRILOSEC) 40 mg capsule Take 40 mg by mouth daily.  bumetanide (BUMEX) 0.5 mg tablet 1 tab each AM 30 Tab 0    escitalopram oxalate (LEXAPRO) 5 mg tablet TAKE ONE-HALF TABLET TO ONE TABLET BY MOUTH DAILY 90 Tab 0    atorvastatin (LIPITOR) 40 mg tablet TAKE ONE TABLET BY MOUTH DAILY 53 Tab 0    metoprolol succinate (TOPROL-XL) 50 mg XL tablet TAKE ONE TABLET BY MOUTH DAILY 30 Tab 0    COQ10, UBIQUINOL, PO Take 100 mg by mouth daily.       tolterodine ER (DETROL LA) 4 mg ER capsule TAKE ONE CAPSULE BY MOUTH DAILY 30 Cap 3    losartan (COZAAR) 50 mg tablet TAKE ONE TABLET BY MOUTH TWICE A  Tab 3    meclizine (ANTIVERT) 12.5 mg tablet Take 1 Tab by mouth two (2) times daily as needed. 40 Tab 2    ferrous sulfate (IRON) 325 mg (65 mg iron) tablet Take 1 Tab by mouth Daily (before breakfast). 30 Tab 2    aspirin 81 mg tablet Take 81 mg by mouth daily. EKG: normal EKG, normal sinus rhythm, unchanged from previous tracings, nonspecific ST and T waves changes. ASSESSMENT and PLAN  Encounter Diagnoses   Name Primary?  Atherosclerosis of native coronary artery of native heart without angina pectoris,stent LAD 2010 EF45% improved to 60% Yes    Essential hypertension, benign     Dizziness     Left carotid bruit     Retinal emboli, right    Cardiac-wise she appears to be stable. She has had no symptoms to indicate angina or cardiac decompensation. She has had no clinical evidence of significant cardiac arrhythmia, including atrial fibrillation. Her EKG is normal.  The cause of her leg edema is not quite clear, although it could be partially related to her life at the present sitting in the chair for a prolonged period of time. She could possibly have venous insufficiency. She does not show any evidence of severe pulmonary hypertension or tricuspid regurgitation and I would not be in favor of further diagnostic workup such as echocardiogram.  She does have quite a bit of memory lapse although she comes right back with the intelligent remarks frequently. It is not clear if she is still on Plavix. If she is currently on Plavix I would recommend that Plavix be discontinued.

## 2019-08-01 DIAGNOSIS — R60.0 BILATERAL LEG EDEMA: ICD-10-CM

## 2019-08-07 ENCOUNTER — HOSPITAL ENCOUNTER (EMERGENCY)
Age: 84
Discharge: HOME | End: 2019-08-07
Attending: EMERGENCY MEDICINE
Payer: MEDICARE

## 2019-08-07 ENCOUNTER — APPOINTMENT (OUTPATIENT)
Dept: GENERAL RADIOLOGY | Age: 84
End: 2019-08-07
Attending: EMERGENCY MEDICINE
Payer: MEDICARE

## 2019-08-07 VITALS
RESPIRATION RATE: 19 BRPM | SYSTOLIC BLOOD PRESSURE: 160 MMHG | BODY MASS INDEX: 21.4 KG/M2 | HEART RATE: 74 BPM | OXYGEN SATURATION: 98 % | DIASTOLIC BLOOD PRESSURE: 51 MMHG | HEIGHT: 60 IN | WEIGHT: 109 LBS | TEMPERATURE: 97.3 F

## 2019-08-07 DIAGNOSIS — R07.9 CHEST PAIN, UNSPECIFIED TYPE: Primary | ICD-10-CM

## 2019-08-07 LAB
ALBUMIN SERPL-MCNC: 3.6 G/DL (ref 3.4–5)
ALBUMIN/GLOB SERPL: 1.2 {RATIO} (ref 0.8–1.7)
ALP SERPL-CCNC: 73 U/L (ref 45–117)
ALT SERPL-CCNC: 19 U/L (ref 13–56)
ANION GAP SERPL CALC-SCNC: 4 MMOL/L (ref 3–18)
APPEARANCE UR: CLEAR
AST SERPL-CCNC: 26 U/L (ref 10–38)
BASOPHILS # BLD: 0 K/UL (ref 0–0.1)
BASOPHILS NFR BLD: 1 % (ref 0–2)
BILIRUB SERPL-MCNC: 0.4 MG/DL (ref 0.2–1)
BILIRUB UR QL: NEGATIVE
BUN SERPL-MCNC: 28 MG/DL (ref 7–18)
BUN/CREAT SERPL: 17 (ref 12–20)
CALCIUM SERPL-MCNC: 8.6 MG/DL (ref 8.5–10.1)
CHLORIDE SERPL-SCNC: 101 MMOL/L (ref 100–111)
CK MB CFR SERPL CALC: 1 % (ref 0–4)
CK MB SERPL-MCNC: 1.1 NG/ML (ref 5–25)
CK SERPL-CCNC: 109 U/L (ref 26–192)
CO2 SERPL-SCNC: 33 MMOL/L (ref 21–32)
COLOR UR: YELLOW
CREAT SERPL-MCNC: 1.61 MG/DL (ref 0.6–1.3)
DIFFERENTIAL METHOD BLD: ABNORMAL
EOSINOPHIL # BLD: 0.5 K/UL (ref 0–0.4)
EOSINOPHIL NFR BLD: 7 % (ref 0–5)
ERYTHROCYTE [DISTWIDTH] IN BLOOD BY AUTOMATED COUNT: 14.5 % (ref 11.6–14.5)
GLOBULIN SER CALC-MCNC: 3.1 G/DL (ref 2–4)
GLUCOSE SERPL-MCNC: 90 MG/DL (ref 74–99)
GLUCOSE UR STRIP.AUTO-MCNC: NEGATIVE MG/DL
HCT VFR BLD AUTO: 28.2 % (ref 35–45)
HGB BLD-MCNC: 8.6 G/DL (ref 12–16)
HGB UR QL STRIP: NEGATIVE
KETONES UR QL STRIP.AUTO: NEGATIVE MG/DL
LEUKOCYTE ESTERASE UR QL STRIP.AUTO: NEGATIVE
LYMPHOCYTES # BLD: 1.8 K/UL (ref 0.9–3.6)
LYMPHOCYTES NFR BLD: 26 % (ref 21–52)
MCH RBC QN AUTO: 29.6 PG (ref 24–34)
MCHC RBC AUTO-ENTMCNC: 30.5 G/DL (ref 31–37)
MCV RBC AUTO: 96.9 FL (ref 74–97)
MONOCYTES # BLD: 0.8 K/UL (ref 0.05–1.2)
MONOCYTES NFR BLD: 12 % (ref 3–10)
NEUTS SEG # BLD: 3.8 K/UL (ref 1.8–8)
NEUTS SEG NFR BLD: 54 % (ref 40–73)
NITRITE UR QL STRIP.AUTO: NEGATIVE
PH UR STRIP: 7.5 [PH] (ref 5–8)
PLATELET # BLD AUTO: 215 K/UL (ref 135–420)
PMV BLD AUTO: 9.8 FL (ref 9.2–11.8)
POTASSIUM SERPL-SCNC: 4.5 MMOL/L (ref 3.5–5.5)
PROT SERPL-MCNC: 6.7 G/DL (ref 6.4–8.2)
PROT UR STRIP-MCNC: NEGATIVE MG/DL
RBC # BLD AUTO: 2.91 M/UL (ref 4.2–5.3)
SODIUM SERPL-SCNC: 138 MMOL/L (ref 136–145)
SP GR UR REFRACTOMETRY: 1.01 (ref 1–1.03)
TROPONIN I SERPL-MCNC: <0.02 NG/ML (ref 0–0.04)
UROBILINOGEN UR QL STRIP.AUTO: 1 EU/DL (ref 0.2–1)
WBC # BLD AUTO: 7 K/UL (ref 4.6–13.2)

## 2019-08-07 PROCEDURE — 81003 URINALYSIS AUTO W/O SCOPE: CPT

## 2019-08-07 PROCEDURE — 99285 EMERGENCY DEPT VISIT HI MDM: CPT

## 2019-08-07 PROCEDURE — 73030 X-RAY EXAM OF SHOULDER: CPT

## 2019-08-07 PROCEDURE — 82550 ASSAY OF CK (CPK): CPT

## 2019-08-07 PROCEDURE — 93005 ELECTROCARDIOGRAM TRACING: CPT

## 2019-08-07 PROCEDURE — 85025 COMPLETE CBC W/AUTO DIFF WBC: CPT

## 2019-08-07 PROCEDURE — 71046 X-RAY EXAM CHEST 2 VIEWS: CPT

## 2019-08-07 PROCEDURE — 80053 COMPREHEN METABOLIC PANEL: CPT

## 2019-08-07 NOTE — ED TRIAGE NOTES
Patient c/o chest pain x 24 hours. C/o upper back pain over several days. States fall recently. C/o right shoulder pain. Bruising noted to face. Friend states that bruising was not present last Tuesday. States possible fall during last week.

## 2019-08-07 NOTE — ED PROVIDER NOTES
EMERGENCY DEPARTMENT HISTORY AND PHYSICAL EXAM    3:05 PM      Date: 8/7/2019  Patient Name: Joshua Ba    History of Presenting Illness     Chief Complaint   Patient presents with    Chest Pain    Back Pain    Shoulder Pain    Fall    Facial Injury         History Provided By: Patient, Patient's  and Caregiver    Additional History (Context): Joshua Ba is a 80 y.o. female with myocardial infarction who presents with burning sensation on her chest.  She went to see her acupuncturist today and they told her that she should come into the emergency room. Patient is a poor historian. She is here with her  and her friend who is saying that she has been feeling well in the last couple of days. Patient denies nausea, vomiting, or diarrhea. She denies any smoking alcohol or recreational drug use. PCP: Debi Anaya MD      Current Outpatient Medications   Medication Sig Dispense Refill    cholecalciferol (VITAMIN D3) 1,000 unit tablet Take  by mouth daily.  cyanocobalamin (VITAMIN B-12) 1,000 mcg tablet Take 1,000 mcg by mouth daily.  omeprazole (PRILOSEC) 40 mg capsule Take 40 mg by mouth daily.  bumetanide (BUMEX) 0.5 mg tablet 1 tab each AM 30 Tab 0    escitalopram oxalate (LEXAPRO) 5 mg tablet TAKE ONE-HALF TABLET TO ONE TABLET BY MOUTH DAILY 90 Tab 0    atorvastatin (LIPITOR) 40 mg tablet TAKE ONE TABLET BY MOUTH DAILY 53 Tab 0    metoprolol succinate (TOPROL-XL) 50 mg XL tablet TAKE ONE TABLET BY MOUTH DAILY 30 Tab 0    COQ10, UBIQUINOL, PO Take 100 mg by mouth daily.  tolterodine ER (DETROL LA) 4 mg ER capsule TAKE ONE CAPSULE BY MOUTH DAILY 30 Cap 3    losartan (COZAAR) 50 mg tablet TAKE ONE TABLET BY MOUTH TWICE A  Tab 3    meclizine (ANTIVERT) 12.5 mg tablet Take 1 Tab by mouth two (2) times daily as needed. 40 Tab 2    ferrous sulfate (IRON) 325 mg (65 mg iron) tablet Take 1 Tab by mouth Daily (before breakfast).  30 Tab 2    aspirin 81 mg tablet Take 81 mg by mouth daily. Past History     Past Medical History:  Past Medical History:   Diagnosis Date    Cardiac ambulatory blood pressure monitoring 10/25/2011    JNC-7 classification:  Stage 1 hypertension.  Cardiac cath 02/22/2010    mRCA 35%. LM patent. Cx 25%. pLAD 100% (2.75 x 23 mm Cypher stent) LVEDP  20.  EF 45%. Anteroapical mod HK    Cardiac echocardiogram 05/11/2010    EF 60%. Gr 2 DDfx. LAE. Mild MR.  Mild-mod PI; PASP 30 mmHg.  Carotid duplex 08/26/2013    Mild 1-49% bilateral ICA stenosis. Biphasic signals in both subclavian arteries.  Coronary artery disease     acute anterior ST-elevation myocardial infarction/primary stenting of the LAD on 02/22/10/EF 45%.  Coronary atherosclerosis of unspecified type of bypass graft(414.05)     Depressive disorder     Fatigue     Headache(784.0)     Heart disease, unspecified     Iron deficiency anemia, unspecified     Migraine     Migraine headache     Myocardial infarction ([address] 02/22/2010    Acute anterior wall w/primary intubation, stenting of prox LAD w/ 2.75-mm Cypher stent. 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  Senile cataract, unspecified     Valvular heart disease     Mitral valve prolapse. Past Surgical History:  Past Surgical History:   Procedure Laterality Date    HX APPENDECTOMY      HX BREAST LUMPECTOMY      Left.  HX CATARACT REMOVAL  5/1/2013, 5/8/2013    left and right    HX CORONARY STENT PLACEMENT  2/22/10    prox LAD w/ 2.75-mm Cypher stent.     HX HEART CATHETERIZATION      HX TONSILLECTOMY         Family History:  Family History   Problem Relation Age of Onset    Heart Disease Mother     Heart Disease Father        Social History:  Social History     Tobacco Use    Smoking status: Never Smoker    Smokeless tobacco: Never Used Substance Use Topics    Alcohol use: No    Drug use: No       Allergies: Allergies   Allergen Reactions    Viibryd [Vilazodone] Anaphylaxis and Vertigo    Coreg [Carvedilol] Other (comments)     Pt states coreg makes her feel like a \"zombie\"    Erythromycin Nausea Only     Severe.  Penicillins Hives         Review of Systems       Review of Systems   Constitutional: Negative. Negative for chills, diaphoresis and fever. HENT: Negative. Negative for congestion, rhinorrhea and sore throat. Eyes: Negative. Negative for pain, discharge and redness. Respiratory: Negative. Negative for cough, chest tightness, shortness of breath and wheezing. Cardiovascular: Negative. Negative for chest pain. Gastrointestinal: Negative. Negative for abdominal pain, constipation, diarrhea, nausea and vomiting. Genitourinary: Negative. Negative for dysuria, flank pain, frequency, hematuria and urgency. Musculoskeletal: Negative. Negative for back pain and neck pain. Skin: Negative. Negative for rash. Neurological: Negative. Negative for syncope, weakness, numbness and headaches. Psychiatric/Behavioral: Negative. All other systems reviewed and are negative. Physical Exam     Visit Vitals  /51   Pulse 74   Temp 97.3 °F (36.3 °C)   Resp 19   Ht 5' (1.524 m)   Wt 49.4 kg (109 lb)   SpO2 98%   BMI 21.29 kg/m²         Physical Exam   Constitutional: She is oriented to person, place, and time. She appears well-nourished. Non-toxic appearance. She does not have a sickly appearance. She appears ill. No distress. Tubal facial and head bruises at different stages of time. HENT:   Head: Normocephalic. Head is with raccoon's eyes and with contusion. Head is without Newman's sign, without laceration and without right periorbital erythema. Hair is normal.   Mouth/Throat: Oropharynx is clear and moist. No oropharyngeal exudate. Eyes: Pupils are equal, round, and reactive to light. Conjunctivae and EOM are normal. No scleral icterus. Neck: Trachea normal and normal range of motion. Neck supple. No hepatojugular reflux and no JVD present. No tracheal deviation present. No thyromegaly present. Cardiovascular: Normal rate, regular rhythm, S1 normal, S2 normal, normal heart sounds, intact distal pulses and normal pulses. Exam reveals no gallop, no S3 and no S4. No murmur heard. Pulses:       Radial pulses are 2+ on the right side, and 2+ on the left side. Dorsalis pedis pulses are 2+ on the right side, and 2+ on the left side. Pulmonary/Chest: Effort normal and breath sounds normal. No accessory muscle usage. No respiratory distress. She has no decreased breath sounds. She has no wheezes. She has no rhonchi. She has no rales. Abdominal: Soft. Normal appearance and bowel sounds are normal. She exhibits no distension and no mass. There is no hepatosplenomegaly. There is no tenderness. There is no rigidity, no rebound, no guarding, no CVA tenderness, no tenderness at McBurney's point and negative Jones's sign. Musculoskeletal: Normal range of motion. She exhibits no edema or tenderness. Strength 3 out of 5 throughout. Lymphadenopathy:        Head (right side): No submental, no submandibular, no preauricular and no occipital adenopathy present. Head (left side): No submental, no submandibular, no preauricular and no occipital adenopathy present. She has no cervical adenopathy. Right: No supraclavicular adenopathy present. Left: No supraclavicular adenopathy present. Neurological: She is alert and oriented to person, place, and time. She has normal reflexes. She is not disoriented. She displays atrophy. No cranial nerve deficit or sensory deficit. Gait abnormal. Coordination normal. GCS eye subscore is 4. GCS verbal subscore is 5. GCS motor subscore is 6. Grossly intact. Skin: Skin is warm, dry and intact. No rash noted. She is not diaphoretic. Psychiatric: She has a normal mood and affect. Her speech is normal and behavior is normal. Judgment and thought content normal. Cognition and memory are normal.   Nursing note and vitals reviewed. Diagnostic Study Results     Labs -  Recent Results (from the past 12 hour(s))   EKG, 12 LEAD, INITIAL    Collection Time: 08/07/19  1:54 PM   Result Value Ref Range    Ventricular Rate 68 BPM    Atrial Rate 68 BPM    P-R Interval 168 ms    QRS Duration 72 ms    Q-T Interval 422 ms    QTC Calculation (Bezet) 448 ms    Calculated P Axis 33 degrees    Calculated R Axis -4 degrees    Calculated T Axis 55 degrees    Diagnosis       Normal sinus rhythm  Normal ECG  When compared with ECG of 23-FEB-2010 05:39,  T wave inversion no longer evident in Anterior leads  QT has shortened     CBC WITH AUTOMATED DIFF    Collection Time: 08/07/19  2:00 PM   Result Value Ref Range    WBC 7.0 4.6 - 13.2 K/uL    RBC 2.91 (L) 4.20 - 5.30 M/uL    HGB 8.6 (L) 12.0 - 16.0 g/dL    HCT 28.2 (L) 35.0 - 45.0 %    MCV 96.9 74.0 - 97.0 FL    MCH 29.6 24.0 - 34.0 PG    MCHC 30.5 (L) 31.0 - 37.0 g/dL    RDW 14.5 11.6 - 14.5 %    PLATELET 054 398 - 917 K/uL    MPV 9.8 9.2 - 11.8 FL    NEUTROPHILS 54 40 - 73 %    LYMPHOCYTES 26 21 - 52 %    MONOCYTES 12 (H) 3 - 10 %    EOSINOPHILS 7 (H) 0 - 5 %    BASOPHILS 1 0 - 2 %    ABS. NEUTROPHILS 3.8 1.8 - 8.0 K/UL    ABS. LYMPHOCYTES 1.8 0.9 - 3.6 K/UL    ABS. MONOCYTES 0.8 0.05 - 1.2 K/UL    ABS. EOSINOPHILS 0.5 (H) 0.0 - 0.4 K/UL    ABS.  BASOPHILS 0.0 0.0 - 0.1 K/UL    DF AUTOMATED     METABOLIC PANEL, COMPREHENSIVE    Collection Time: 08/07/19  2:00 PM   Result Value Ref Range    Sodium 138 136 - 145 mmol/L    Potassium 4.5 3.5 - 5.5 mmol/L    Chloride 101 100 - 111 mmol/L    CO2 33 (H) 21 - 32 mmol/L    Anion gap 4 3.0 - 18 mmol/L    Glucose 90 74 - 99 mg/dL    BUN 28 (H) 7.0 - 18 MG/DL    Creatinine 1.61 (H) 0.6 - 1.3 MG/DL    BUN/Creatinine ratio 17 12 - 20      GFR est AA 36 (L) >60 ml/min/1.73m2 GFR est non-AA 30 (L) >60 ml/min/1.73m2    Calcium 8.6 8.5 - 10.1 MG/DL    Bilirubin, total 0.4 0.2 - 1.0 MG/DL    ALT (SGPT) 19 13 - 56 U/L    AST (SGOT) 26 10 - 38 U/L    Alk. phosphatase 73 45 - 117 U/L    Protein, total 6.7 6.4 - 8.2 g/dL    Albumin 3.6 3.4 - 5.0 g/dL    Globulin 3.1 2.0 - 4.0 g/dL    A-G Ratio 1.2 0.8 - 1.7     CARDIAC PANEL,(CK, CKMB & TROPONIN)    Collection Time: 08/07/19  2:00 PM   Result Value Ref Range     26 - 192 U/L    CK - MB 1.1 <3.6 ng/ml    CK-MB Index 1.0 0.0 - 4.0 %    Troponin-I, QT <0.02 0.0 - 0.045 NG/ML   URINALYSIS W/ RFLX MICROSCOPIC    Collection Time: 08/07/19  2:45 PM   Result Value Ref Range    Color YELLOW      Appearance CLEAR      Specific gravity 1.008 1.005 - 1.030      pH (UA) 7.5 5.0 - 8.0      Protein NEGATIVE  NEG mg/dL    Glucose NEGATIVE  NEG mg/dL    Ketone NEGATIVE  NEG mg/dL    Bilirubin NEGATIVE  NEG      Blood NEGATIVE  NEG      Urobilinogen 1.0 0.2 - 1.0 EU/dL    Nitrites NEGATIVE  NEG      Leukocyte Esterase NEGATIVE  NEG         Radiologic Studies -   XR SHOULDER RT AP/LAT MIN 2 V   Final Result   IMPRESSION:      Mid thoracic vertebral body compression deformities. They are age indeterminate. Correlation with point tenderness and/or MRI recommended. No shoulder fracture appreciated. Enlarged cardiac silhouette. Atherosclerosis. Please see report for additional findings and further details. XR CHEST PA LAT   Final Result   IMPRESSION:      Mid thoracic vertebral body compression deformities. They are age indeterminate. Correlation with point tenderness and/or MRI recommended. No shoulder fracture appreciated. Enlarged cardiac silhouette. Atherosclerosis. Please see report for additional findings and further details.                   Medical Decision Making   Provider Notes (Medical Decision Making):  MDM  Number of Diagnoses or Management Options  Diagnosis management comments: Falls  Electrolyte imbalance         I am the first provider for this patient. I reviewed the vital signs, available nursing notes, past medical history, past surgical history, family history and social history. Vital Signs-Reviewed the patient's vital signs. Records Reviewed: Nursing Notes (Time of Review: 3:05 PM)    ED Course: Progress Notes, Reevaluation, and Consults:    Labs essentially normal with the exception of hemoglobin of 8.6 this is chronic. Chest X-Ray showed degenerative changes with no acute process. X-ray of the right shoulder showed no acute fractures  EKG showed NSR with rate of 68 bpm. With no ST elevations or depression and non specific T wave changes. 3:45 PM 8/7/2019        Diagnosis       I have reassessed the patient. Patient is feeling well. Patient was discharged in stable condition. Patient is to return to emergency department if any new or worsening condition. Clinical Impression:   1. Chest pain, unspecified type        Disposition: Discharge home    Follow-up Information     Follow up With Specialties Details Why Contact Info    Debi Anaya MD Internal Medicine   [address] [place] [phone]  [phone]               Attestation        Provider Attestation:     I personally performed the services described in the documentation, reviewed the documentation and it accurately and completely records my words and actions utilizing the [address] [place] August 07, 2019 at 3:59 PM - [place], [address]. It is dictated using utilizing voice recognition software. Unfortunately this leads to occasional typographical errors. I apologize in advance if the situation occurs. If questions arise please do not hesitate to contact me or call our department.

## 2019-08-07 NOTE — DISCHARGE INSTRUCTIONS

## 2019-08-07 NOTE — ROUTINE PROCESS
Chandana Guerrero is a 80 y.o. female that was discharged in stable. Pt was accompanied by spouse. Pt is not driving. The patients diagnosis, condition and treatment were explained to  patient and aftercare instructions were given. The patient verbalized understanding. Patient armband removed and shredded.

## 2019-08-08 LAB
ATRIAL RATE: 68 BPM
CALCULATED P AXIS, ECG09: 33 DEGREES
CALCULATED R AXIS, ECG10: -4 DEGREES
CALCULATED T AXIS, ECG11: 55 DEGREES
DIAGNOSIS, 93000: NORMAL
P-R INTERVAL, ECG05: 168 MS
Q-T INTERVAL, ECG07: 422 MS
QRS DURATION, ECG06: 72 MS
QTC CALCULATION (BEZET), ECG08: 448 MS
VENTRICULAR RATE, ECG03: 68 BPM

## 2019-08-11 DIAGNOSIS — R42 DIZZINESS: ICD-10-CM

## 2019-08-12 RX ORDER — MECLIZINE HCL 12.5 MG 12.5 MG/1
TABLET ORAL
Qty: 40 TAB | Refills: 1 | Status: SHIPPED | OUTPATIENT
Start: 2019-08-12 | End: 2020-01-13 | Stop reason: ALTCHOICE

## 2019-08-20 RX ORDER — ATORVASTATIN CALCIUM 40 MG/1
TABLET, FILM COATED ORAL
Qty: 53 TAB | Refills: 0 | Status: SHIPPED | OUTPATIENT
Start: 2019-08-20 | End: 2019-09-18 | Stop reason: SDUPTHER

## 2019-08-26 RX ORDER — CLOPIDOGREL BISULFATE 75 MG/1
75 TABLET ORAL DAILY
Qty: 90 TAB | Refills: 0 | Status: SHIPPED | OUTPATIENT
Start: 2019-08-26 | End: 2019-09-24

## 2019-08-26 RX ORDER — METOPROLOL SUCCINATE 50 MG/1
TABLET, EXTENDED RELEASE ORAL
Qty: 30 TAB | Refills: 2 | Status: SHIPPED | OUTPATIENT
Start: 2019-08-26

## 2019-08-26 NOTE — TELEPHONE ENCOUNTER
Requested Prescriptions     Pending Prescriptions Disp Refills    metoprolol succinate (TOPROL-XL) 50 mg XL tablet 30 Tab 2     Sig: Take 1 po daily    clopidogrel (PLAVIX) 75 mg tab 90 Tab 0     Sig: Take 1 Tab by mouth daily for 90 days.

## 2019-09-19 ENCOUNTER — APPOINTMENT (OUTPATIENT)
Dept: GENERAL RADIOLOGY | Age: 84
End: 2019-09-19
Attending: EMERGENCY MEDICINE
Payer: MEDICARE

## 2019-09-19 ENCOUNTER — APPOINTMENT (OUTPATIENT)
Dept: CT IMAGING | Age: 84
End: 2019-09-19
Attending: EMERGENCY MEDICINE
Payer: MEDICARE

## 2019-09-19 ENCOUNTER — HOSPITAL ENCOUNTER (EMERGENCY)
Age: 84
Discharge: ANOTHER HOSPITAL | End: 2019-09-20
Attending: EMERGENCY MEDICINE
Payer: MEDICARE

## 2019-09-19 DIAGNOSIS — S06.5XAA SDH (SUBDURAL HEMATOMA): Primary | ICD-10-CM

## 2019-09-19 DIAGNOSIS — I24.9 ACS (ACUTE CORONARY SYNDROME) (HCC): ICD-10-CM

## 2019-09-19 LAB
ALBUMIN SERPL-MCNC: 3.2 G/DL (ref 3.4–5)
ALBUMIN/GLOB SERPL: 0.9 {RATIO} (ref 0.8–1.7)
ALP SERPL-CCNC: 73 U/L (ref 45–117)
ALT SERPL-CCNC: 21 U/L (ref 13–56)
ANION GAP SERPL CALC-SCNC: 4 MMOL/L (ref 3–18)
APPEARANCE UR: CLEAR
APTT PPP: 26.9 SEC (ref 23–36.4)
AST SERPL-CCNC: 30 U/L (ref 10–38)
ATRIAL RATE: 105 BPM
ATRIAL RATE: 84 BPM
BASOPHILS # BLD: 0 K/UL (ref 0–0.1)
BASOPHILS NFR BLD: 0 % (ref 0–2)
BILIRUB SERPL-MCNC: 0.4 MG/DL (ref 0.2–1)
BILIRUB UR QL: NEGATIVE
BUN SERPL-MCNC: 25 MG/DL (ref 7–18)
BUN/CREAT SERPL: 25 (ref 12–20)
CALCIUM SERPL-MCNC: 9.2 MG/DL (ref 8.5–10.1)
CALCULATED P AXIS, ECG09: 62 DEGREES
CALCULATED R AXIS, ECG10: 2 DEGREES
CALCULATED R AXIS, ECG10: 6 DEGREES
CALCULATED T AXIS, ECG11: 40 DEGREES
CALCULATED T AXIS, ECG11: 57 DEGREES
CHLORIDE SERPL-SCNC: 108 MMOL/L (ref 100–111)
CO2 SERPL-SCNC: 29 MMOL/L (ref 21–32)
COLOR UR: YELLOW
CREAT SERPL-MCNC: 1.02 MG/DL (ref 0.6–1.3)
DIAGNOSIS, 93000: NORMAL
DIAGNOSIS, 93000: NORMAL
DIFFERENTIAL METHOD BLD: ABNORMAL
EOSINOPHIL # BLD: 0.6 K/UL (ref 0–0.4)
EOSINOPHIL NFR BLD: 7 % (ref 0–5)
ERYTHROCYTE [DISTWIDTH] IN BLOOD BY AUTOMATED COUNT: 15.4 % (ref 11.6–14.5)
GLOBULIN SER CALC-MCNC: 3.6 G/DL (ref 2–4)
GLUCOSE SERPL-MCNC: 104 MG/DL (ref 74–99)
GLUCOSE UR STRIP.AUTO-MCNC: NEGATIVE MG/DL
HCT VFR BLD AUTO: 34.3 % (ref 35–45)
HGB BLD-MCNC: 11.2 G/DL (ref 12–16)
HGB UR QL STRIP: NEGATIVE
INR PPP: 1.2 (ref 0.8–1.2)
KETONES UR QL STRIP.AUTO: NEGATIVE MG/DL
LEUKOCYTE ESTERASE UR QL STRIP.AUTO: NEGATIVE
LYMPHOCYTES # BLD: 2.1 K/UL (ref 0.9–3.6)
LYMPHOCYTES NFR BLD: 27 % (ref 21–52)
MAGNESIUM SERPL-MCNC: 1.9 MG/DL (ref 1.6–2.6)
MCH RBC QN AUTO: 29 PG (ref 24–34)
MCHC RBC AUTO-ENTMCNC: 32.7 G/DL (ref 31–37)
MCV RBC AUTO: 88.9 FL (ref 74–97)
MONOCYTES # BLD: 0.9 K/UL (ref 0.05–1.2)
MONOCYTES NFR BLD: 11 % (ref 3–10)
NEUTS SEG # BLD: 4.3 K/UL (ref 1.8–8)
NEUTS SEG NFR BLD: 55 % (ref 40–73)
NITRITE UR QL STRIP.AUTO: NEGATIVE
P-R INTERVAL, ECG05: 160 MS
PH UR STRIP: 6 [PH] (ref 5–8)
PLATELET # BLD AUTO: 191 K/UL (ref 135–420)
PMV BLD AUTO: 11.2 FL (ref 9.2–11.8)
POTASSIUM SERPL-SCNC: 4.6 MMOL/L (ref 3.5–5.5)
PROT SERPL-MCNC: 6.8 G/DL (ref 6.4–8.2)
PROT UR STRIP-MCNC: NEGATIVE MG/DL
PROTHROMBIN TIME: 14.5 SEC (ref 11.5–15.2)
Q-T INTERVAL, ECG07: 364 MS
Q-T INTERVAL, ECG07: 376 MS
QRS DURATION, ECG06: 70 MS
QRS DURATION, ECG06: 82 MS
QTC CALCULATION (BEZET), ECG08: 430 MS
QTC CALCULATION (BEZET), ECG08: 444 MS
RBC # BLD AUTO: 3.86 M/UL (ref 4.2–5.3)
SODIUM SERPL-SCNC: 141 MMOL/L (ref 136–145)
SP GR UR REFRACTOMETRY: 1.01 (ref 1–1.03)
TROPONIN I SERPL-MCNC: 0.28 NG/ML (ref 0–0.04)
UROBILINOGEN UR QL STRIP.AUTO: 0.2 EU/DL (ref 0.2–1)
VENTRICULAR RATE, ECG03: 84 BPM
VENTRICULAR RATE, ECG03: 84 BPM
WBC # BLD AUTO: 7.9 K/UL (ref 4.6–13.2)

## 2019-09-19 PROCEDURE — 81003 URINALYSIS AUTO W/O SCOPE: CPT

## 2019-09-19 PROCEDURE — 85025 COMPLETE CBC W/AUTO DIFF WBC: CPT

## 2019-09-19 PROCEDURE — 71045 X-RAY EXAM CHEST 1 VIEW: CPT

## 2019-09-19 PROCEDURE — 96375 TX/PRO/DX INJ NEW DRUG ADDON: CPT

## 2019-09-19 PROCEDURE — 93005 ELECTROCARDIOGRAM TRACING: CPT

## 2019-09-19 PROCEDURE — 83735 ASSAY OF MAGNESIUM: CPT

## 2019-09-19 PROCEDURE — 96366 THER/PROPH/DIAG IV INF ADDON: CPT

## 2019-09-19 PROCEDURE — 74011250637 HC RX REV CODE- 250/637: Performed by: EMERGENCY MEDICINE

## 2019-09-19 PROCEDURE — 70450 CT HEAD/BRAIN W/O DYE: CPT

## 2019-09-19 PROCEDURE — 74011000250 HC RX REV CODE- 250: Performed by: EMERGENCY MEDICINE

## 2019-09-19 PROCEDURE — 84484 ASSAY OF TROPONIN QUANT: CPT

## 2019-09-19 PROCEDURE — 74011636320 HC RX REV CODE- 636/320: Performed by: EMERGENCY MEDICINE

## 2019-09-19 PROCEDURE — 70496 CT ANGIOGRAPHY HEAD: CPT

## 2019-09-19 PROCEDURE — 80053 COMPREHEN METABOLIC PANEL: CPT

## 2019-09-19 PROCEDURE — 99285 EMERGENCY DEPT VISIT HI MDM: CPT

## 2019-09-19 PROCEDURE — 85610 PROTHROMBIN TIME: CPT

## 2019-09-19 PROCEDURE — 96365 THER/PROPH/DIAG IV INF INIT: CPT

## 2019-09-19 PROCEDURE — 74011000258 HC RX REV CODE- 258: Performed by: EMERGENCY MEDICINE

## 2019-09-19 PROCEDURE — 74011250636 HC RX REV CODE- 250/636: Performed by: EMERGENCY MEDICINE

## 2019-09-19 PROCEDURE — 85730 THROMBOPLASTIN TIME PARTIAL: CPT

## 2019-09-19 RX ORDER — LABETALOL HCL 20 MG/4 ML
20 SYRINGE (ML) INTRAVENOUS
Status: COMPLETED | OUTPATIENT
Start: 2019-09-19 | End: 2019-09-19

## 2019-09-19 RX ORDER — DEXAMETHASONE SODIUM PHOSPHATE 4 MG/ML
6 INJECTION, SOLUTION INTRA-ARTICULAR; INTRALESIONAL; INTRAMUSCULAR; INTRAVENOUS; SOFT TISSUE
Status: COMPLETED | OUTPATIENT
Start: 2019-09-19 | End: 2019-09-19

## 2019-09-19 RX ORDER — GUAIFENESIN 100 MG/5ML
324 LIQUID (ML) ORAL
Status: COMPLETED | OUTPATIENT
Start: 2019-09-19 | End: 2019-09-19

## 2019-09-19 RX ORDER — ATORVASTATIN CALCIUM 40 MG/1
TABLET, FILM COATED ORAL
Qty: 53 TAB | Refills: 0 | Status: SHIPPED | OUTPATIENT
Start: 2019-09-19 | End: 2020-06-08 | Stop reason: SDUPTHER

## 2019-09-19 RX ORDER — METOPROLOL SUCCINATE 50 MG/1
50 TABLET, EXTENDED RELEASE ORAL
Status: COMPLETED | OUTPATIENT
Start: 2019-09-19 | End: 2019-09-19

## 2019-09-19 RX ADMIN — METOPROLOL SUCCINATE 50 MG: 50 TABLET, EXTENDED RELEASE ORAL at 11:19

## 2019-09-19 RX ADMIN — IOPAMIDOL 80 ML: 755 INJECTION, SOLUTION INTRAVENOUS at 12:37

## 2019-09-19 RX ADMIN — DEXAMETHASONE SODIUM PHOSPHATE 6 MG: 4 INJECTION, SOLUTION INTRAMUSCULAR; INTRAVENOUS at 12:57

## 2019-09-19 RX ADMIN — SODIUM CHLORIDE 5 MG/HR: 900 INJECTION, SOLUTION INTRAVENOUS at 14:39

## 2019-09-19 RX ADMIN — ASPIRIN 81 MG 324 MG: 81 TABLET ORAL at 11:19

## 2019-09-19 RX ADMIN — LABETALOL 20 MG/4 ML (5 MG/ML) INTRAVENOUS SYRINGE 20 MG: at 12:57

## 2019-09-19 NOTE — ED TRIAGE NOTES
EMS reports frequent falls over last wee; fell this am without visible injury; BP elevated; controled with BP meds.

## 2019-09-19 NOTE — ED PROVIDER NOTES
EMERGENCY DEPARTMENT HISTORY AND PHYSICAL EXAM    10:03 AM  Date: 9/19/2019  Patient Name: Americo Loving    History of Presenting Illness     No chief complaint on file. History Provided By: Patient and Patient's     HPI: Americo Loving is a 80 y.o. female with multiple medical problems and significant cardiac history as below. Patient is presenting with frequent falls, 4 times within the past week. Patient reports that she feels lightheaded close to passing out and falls and she feels unsteady on her feet. Denies vertigo or spinning sensation. Denies headache or head injury or LOC. No history of recent illness, chills, fever, nausea or vomiting. No urinary symptoms, chest pain or shortness of breath. Location:  Severity:  Timing/course: Onset/Duration:     PCP: Pascale Martínez MD    Past History     Past Medical History:  Past Medical History:   Diagnosis Date    Cardiac ambulatory blood pressure monitoring 10/25/2011    JNC-7 classification:  Stage 1 hypertension.  Cardiac cath 02/22/2010    mRCA 35%. LM patent. Cx 25%. pLAD 100% (2.75 x 23 mm Cypher stent) LVEDP  20.  EF 45%. Anteroapical mod HK    Cardiac echocardiogram 05/11/2010    EF 60%. Gr 2 DDfx. LAE. Mild MR.  Mild-mod PI; PASP 30 mmHg.  Carotid duplex 08/26/2013    Mild 1-49% bilateral ICA stenosis. Biphasic signals in both subclavian arteries.  Coronary artery disease     acute anterior ST-elevation myocardial infarction/primary stenting of the LAD on 02/22/10/EF 45%.  Coronary atherosclerosis of unspecified type of bypass graft(414.05)     Depressive disorder     Fatigue     Headache(784.0)     Heart disease, unspecified     Iron deficiency anemia, unspecified     Migraine     Migraine headache     Myocardial infarction ([facility] [address] [address] 02/22/2010    Acute anterior wall w/primary intubation, stenting of prox LAD w/ 2.75-mm Cypher stent.    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  Senile cataract, unspecified     Valvular heart disease     Mitral valve prolapse. Past Surgical History:  Past Surgical History:   Procedure Laterality Date    HX APPENDECTOMY      HX BREAST LUMPECTOMY      Left.  HX CATARACT REMOVAL  5/1/2013, 5/8/2013    left and right    HX CORONARY STENT PLACEMENT  2/22/10    prox LAD w/ 2.75-mm Cypher stent.  HX HEART CATHETERIZATION      HX TONSILLECTOMY         Family History:  Family History   Problem Relation Age of Onset    Heart Disease Mother     Heart Disease Father        Social History:  Social History     Tobacco Use    Smoking status: Never Smoker    Smokeless tobacco: Never Used   Substance Use Topics    Alcohol use: No    Drug use: No       Allergies: Allergies   Allergen Reactions    Viibryd [Vilazodone] Anaphylaxis and Vertigo    Coreg [Carvedilol] Other (comments)     Pt states coreg makes her feel like a \"zombie\"    Erythromycin Nausea Only     Severe.  Penicillins Hives       Review of Systems   Review of Systems   Neurological: Positive for light-headedness. Hematological: Bruises/bleeds easily. All other systems reviewed and are negative. Physical Exam     Patient Vitals for the past 12 hrs:   Temp Pulse Resp BP SpO2   09/19/19 0917 97.6 °F (36.4 °C) 86 9 (!) 210/83 99 %       Physical Exam   Constitutional: She is oriented to person, place, and time. No distress. HENT:   Head: Normocephalic and atraumatic. Eyes: Pupils are equal, round, and reactive to light. Conjunctivae and EOM are normal.   Neck: Normal range of motion. Neck supple. Cardiovascular: Normal rate and normal heart sounds. Pulmonary/Chest: Effort normal and breath sounds normal.   Abdominal: Soft. She exhibits no distension. There is no tenderness. Musculoskeletal: Normal range of motion.  She exhibits no edema, tenderness or deformity. Neurological: She is oriented to person, place, and time. No cranial nerve deficit. She exhibits normal muscle tone. Coordination normal.   Skin: Skin is warm and dry. She is not diaphoretic. Psychiatric: She has a normal mood and affect. Her behavior is normal.       Diagnostic Study Results     Labs -  No results found for this or any previous visit (from the past 12 hour(s)). Radiologic Studies -   No results found. Medical Decision Making     ED Course: Progress Notes, Reevaluation, and Consults:    10:03 AM Initial assessment performed. The patients presenting problems have been discussed, and they/their family are in agreement with the care plan formulated and outlined with them. I have encouraged them to ask questions as they arise throughout their visit. 12:50 PM  Received a call from the radiologist regarding a large subdural hematoma on the CT scan with 6 mm midline shift. Patient remains nonfocal on exam.  I will order her a dose of dexamethasone and get a better control over her blood pressure as well. Transfer center has been contacted to initiate transfer to neurosurgical facility    1:28 PM  With the neurosurgeon on-call at [facility] who recommended ICU admission for observation at [facility], patient is likely not a surgical candidate. Waiting to hear back from ICU at [facility]    Provider Notes (Medical Decision Making): 55-year-old female presenting with frequent falls and lightheadedness without syncope. Patient does not appear to be in distress and has currently no complaints. Vital signs show elevated blood pressure but she did not take her medications this morning. Her neuro exam is nonfocal and she has intact coordination. Given her age and the frequent falls I will still repeat her imaging also obtain a head and neck CTA to evaluate for possible posterior circulation stroke since the symptoms have been going on for a week.   She has been evaluated by cardiology recently for the frequent falls and had a negative cardiac work-up and they did not think it was related to her cardiac condition. Procedures:     Critical Care Time:     Vital Signs-Reviewed the patient's vital signs. Reviewed pt's pulse ox reading. EKG: Interpreted by the EP. Time Interpreted:    Rate:    Rhythm: Normal Sinus Rhythm 84   Interpretation: Sinus arrhythmia   Comparison: No significant interval changes    Records Reviewed: Nursing Notes, Old Medical Records and Previous electrocardiograms (Time of Review: 10:03 AM)  -I am the first provider for this patient.  -I reviewed the vital signs, available nursing notes, past medical history, past surgical history, family history and social history. Current Outpatient Medications   Medication Sig Dispense Refill    metoprolol succinate (TOPROL-XL) 50 mg XL tablet Take 1 po daily 30 Tab 2    clopidogrel (PLAVIX) 75 mg tab Take 1 Tab by mouth daily for 90 days. 90 Tab 0    atorvastatin (LIPITOR) 40 mg tablet TAKE ONE TABLET BY MOUTH DAILY 53 Tab 0    meclizine (ANTIVERT) 12.5 mg tablet TAKE ONE TABLET BY MOUTH TWICE A DAY AS NEEDED 40 Tab 1    cholecalciferol (VITAMIN D3) 1,000 unit tablet Take  by mouth daily.  cyanocobalamin (VITAMIN B-12) 1,000 mcg tablet Take 1,000 mcg by mouth daily.  omeprazole (PRILOSEC) 40 mg capsule Take 40 mg by mouth daily.  bumetanide (BUMEX) 0.5 mg tablet 1 tab each AM 30 Tab 0    escitalopram oxalate (LEXAPRO) 5 mg tablet TAKE ONE-HALF TABLET TO ONE TABLET BY MOUTH DAILY 90 Tab 0    COQ10, UBIQUINOL, PO Take 100 mg by mouth daily.  tolterodine ER (DETROL LA) 4 mg ER capsule TAKE ONE CAPSULE BY MOUTH DAILY 30 Cap 3    losartan (COZAAR) 50 mg tablet TAKE ONE TABLET BY MOUTH TWICE A  Tab 3    ferrous sulfate (IRON) 325 mg (65 mg iron) tablet Take 1 Tab by mouth Daily (before breakfast). 30 Tab 2    aspirin 81 mg tablet Take 81 mg by mouth daily. Clinical Impression     Clinical Impression: No diagnosis found. Disposition: Transfer      This note was dictated utilizing voice recognition software which may lead to typographical errors. I apologize in advance if the situation occurs. If questions arise please do not hesitate to contact me or call our department.     Marline Chambers MD  10:03 AM

## 2019-09-20 ENCOUNTER — APPOINTMENT (OUTPATIENT)
Dept: CT IMAGING | Age: 84
End: 2019-09-20
Attending: EMERGENCY MEDICINE
Payer: MEDICARE

## 2019-09-20 VITALS
OXYGEN SATURATION: 98 % | TEMPERATURE: 97.7 F | SYSTOLIC BLOOD PRESSURE: 135 MMHG | HEART RATE: 71 BPM | DIASTOLIC BLOOD PRESSURE: 63 MMHG | RESPIRATION RATE: 11 BRPM

## 2019-09-20 PROBLEM — S06.5XAA SUBDURAL HEMATOMA: Status: ACTIVE | Noted: 2019-09-20

## 2019-09-20 LAB — TROPONIN I SERPL-MCNC: 0.16 NG/ML (ref 0–0.04)

## 2019-09-20 PROCEDURE — 96365 THER/PROPH/DIAG IV INF INIT: CPT

## 2019-09-20 PROCEDURE — 93005 ELECTROCARDIOGRAM TRACING: CPT

## 2019-09-20 PROCEDURE — 70450 CT HEAD/BRAIN W/O DYE: CPT

## 2019-09-20 PROCEDURE — 84484 ASSAY OF TROPONIN QUANT: CPT

## 2019-09-20 PROCEDURE — 96366 THER/PROPH/DIAG IV INF ADDON: CPT

## 2019-09-20 NOTE — ED NOTES
Received nursing report from [id number], RN. Patient resting comfortably on stretcher, receiving cardene infusion at 5mg/hr. B/P are being maintained below 614 systolic as per provider's orders. Denies any new complaints. Patient awaiting bed assignment and transfer to neuro ICU at [address] [facility]. Family member made aware.

## 2019-09-20 NOTE — ED NOTES
Patient attempting to get out of bed, having auditory hallucinations. Patient states, \"I see the dogs in the room and the nice rabbit over there\". Patient is A/O x 3.

## 2019-09-20 NOTE — ED NOTES
I had contacted [facility] transfer center to inquire about the bed situation and they still do not have any updates. There are a few patients who are supposed to be downgraded to floor level but the hospital is full and they do not have any time estimate on available beds. Per their transfer center [facility] declined the transfer request.    I contacted our transfer center asking if we can transfer to [place]. I spoke with the transfer center at [place] and they said they will discuss with their neurosurgeon. Their neurosurgeon declined the transfer and I was unable to talk to him as the patient will not require any neurosurgical intervention he did not see any benefit of transfer and suggested that she waits for her bed at [facility] to check on the patient and there is worsening of her mental status, she is hallucinating but still alert and oriented. Has no focal neurological deficits still. Since her last CT was over 24 hours ago I will repeat the head CT. Her systolic blood pressure was also 149 so I had asked her nurse to restart her on the Cardene drip maintaining blood pressure goals of systolic 467 or below. I also ordered a repeat troponin on her since her troponin was mildly elevated yesterday. As well as a repeat EKG. Repeat head CT is stable, EKG unchanged, troponin downtrending. Case was discussed with [facility] and was accepted by ICU there.     Amos Simmons MD  1:29 PM  09/20/19

## 2019-09-20 NOTE — ED NOTES
ED tech attempted to ambulate with patient bathroom, patient is weak with unsteady gait. Patient placed back in bed with assistance, purewick applied. Patient made aware continues to await transfer to [facility].

## 2019-09-20 NOTE — ED NOTES
7 AM patient turned over to me Dr Jailene Horne. She is a 70-year-old female who is unsteady and having frequent falls presents with acute on chronic subdural.  She is awake and alert without complaint at this time. Blood pressure is about 209 systolic. The nicardipine drip has been stopped. She is currently pending transfer for admission at [facility] for consult with neurosurgery. General; AOX3. Pulmonary; CTA-B. Cardiac: RRR no MRG; Abd S/NT/ND. Plan:  Transfer. Medical list reviewed. Will hold on the Plavix and aspirin due to intracranial hemorrhage. Given the nicardipine drip and her current blood pressure will hold on the Toprol and Cozaar.   Defer other medications to the admitting team

## 2019-09-21 LAB
ATRIAL RATE: 81 BPM
CALCULATED P AXIS, ECG09: 63 DEGREES
CALCULATED R AXIS, ECG10: 13 DEGREES
CALCULATED T AXIS, ECG11: 45 DEGREES
DIAGNOSIS, 93000: NORMAL
P-R INTERVAL, ECG05: 180 MS
Q-T INTERVAL, ECG07: 388 MS
QRS DURATION, ECG06: 78 MS
QTC CALCULATION (BEZET), ECG08: 450 MS
VENTRICULAR RATE, ECG03: 81 BPM

## 2019-10-18 DIAGNOSIS — N32.81 OVERACTIVE BLADDER: ICD-10-CM

## 2019-10-18 RX ORDER — TOLTERODINE 4 MG/1
CAPSULE, EXTENDED RELEASE ORAL
Qty: 30 CAP | Refills: 2 | Status: SHIPPED | OUTPATIENT
Start: 2019-10-18 | End: 2020-01-13 | Stop reason: ALTCHOICE

## 2019-10-22 RX ORDER — ESCITALOPRAM OXALATE 5 MG/1
TABLET ORAL
Qty: 90 TAB | Refills: 0 | Status: SHIPPED | OUTPATIENT
Start: 2019-10-22

## 2019-11-22 RX ORDER — CLOPIDOGREL BISULFATE 75 MG/1
TABLET ORAL
Qty: 90 TAB | Refills: 0 | Status: SHIPPED | OUTPATIENT
Start: 2019-11-22 | End: 2020-01-13 | Stop reason: SDUPTHER

## 2019-12-18 ENCOUNTER — PATIENT OUTREACH (OUTPATIENT)
Dept: CARDIOLOGY CLINIC | Age: 84
End: 2019-12-18

## 2019-12-18 NOTE — PROGRESS NOTES
Complex Case Management      Date/Time:  2019 2:14 PM    Method of communication with patient:phone    Nurse Navigator (NN) contacted the patient by telephone to perform Ambulatory Care Coordination. Verified name and  with patient as identifiers. Provided introduction to self, and explanation of the Ambulatory Care Manager's role. Reviewed most recent clinic visit w/ patient who verbalized understanding. Patient given an opportunity to ask questions. Top Challenges reviewed with the patient   1. Hx of recent subdural hematoma w/ admission. 2. Hx of LLE, Anemia, HTN, HLD, CAD  3. Pt w/ no scheduled f/u w/ PCP, pt states will call back regarding choice to continue w/ Dr. Emmanuel Sheehan office or to move to someone else. Will follow closely. 4. Pt states feels well, no issues currently     The patient agrees to contact the PCP office or the [address] [place] [address] for questions related to their healthcare. Provided contact information for future reference. Disease Specific:   N/A    Home Health Active: No    DME Active: No    Barriers to care? none    Advance Care Planning:   Does patient have an Advance Directive:  not on file; education provided     Medication(s):   Medication reconciliation was not performed with patient, who declined, but verbalizes understanding of administration of home medications. There were no barriers to obtaining medications identified at this time.     Referral to Pharm D needed: no     Current Outpatient Medications   Medication Sig    clopidogrel (PLAVIX) 75 mg tab TAKE ONE TABLET BY MOUTH DAILY    escitalopram oxalate (LEXAPRO) 5 mg tablet TAKE ONE-HALF TABLET TO ONE TABLET BY MOUTH DAILY    tolterodine ER (DETROL LA) 4 mg ER capsule TAKE ONE CAPSULE BY MOUTH DAILY    atorvastatin (LIPITOR) 40 mg tablet TAKE ONE TABLET BY MOUTH DAILY    metoprolol succinate (TOPROL-XL) 50 mg XL tablet Take 1 po daily    meclizine (ANTIVERT) 12.5 mg tablet TAKE ONE TABLET BY MOUTH TWICE A DAY AS NEEDED    cholecalciferol (VITAMIN D3) 1,000 unit tablet Take  by mouth daily.  cyanocobalamin (VITAMIN B-12) 1,000 mcg tablet Take 1,000 mcg by mouth daily.  omeprazole (PRILOSEC) 40 mg capsule Take 40 mg by mouth daily.  COQ10, UBIQUINOL, PO Take 100 mg by mouth daily.  losartan (COZAAR) 50 mg tablet TAKE ONE TABLET BY MOUTH TWICE A DAY    ferrous sulfate (IRON) 325 mg (65 mg iron) tablet Take 1 Tab by mouth Daily (before breakfast). No current facility-administered medications for this visit.         [facility] follow up appointment(s):   Future Appointments   Date Time Provider Augusta Coffman   1/27/2020  1:20 PM Aydee Collier  [facility]        Non-[facility] follow up appointment(s):     Goals      Attends follow up appointments on schedule      12/18/19 Patient will attend all scheduled appointments through 02/18/20       Knowledge and adherence of prescribed medication (ie. action, side effects, missed dose, etc.).      12/18/19 Pt will take all medications prescribed to be evaluated on each outreach through  02/18/20       Prepare patients and caregivers for end of life decisions (ie. need for hospice, pain management, symptom relief, advance directives etc.)      12/18/19 Pt will complete an ACP and have it scanned into their EMR by 02/18/20

## 2019-12-31 ENCOUNTER — PATIENT OUTREACH (OUTPATIENT)
Dept: FAMILY MEDICINE CLINIC | Facility: CLINIC | Age: 84
End: 2019-12-31

## 2020-01-07 ENCOUNTER — PATIENT OUTREACH (OUTPATIENT)
Dept: FAMILY MEDICINE CLINIC | Facility: CLINIC | Age: 85
End: 2020-01-07

## 2020-01-13 ENCOUNTER — PATIENT OUTREACH (OUTPATIENT)
Dept: FAMILY MEDICINE CLINIC | Facility: CLINIC | Age: 85
End: 2020-01-13

## 2020-01-13 NOTE — TELEPHONE ENCOUNTER
Requested Prescriptions     Pending Prescriptions Disp Refills    clopidogrel (PLAVIX) 75 mg tab 90 Tab 0     Dr Leonardo Timmons patient called  In by nurse navigator

## 2020-01-13 NOTE — PROGRESS NOTES
Complex Case Management      Date/Time:  2020 12:01 PM    Method of communication with patient:phone    [address] [facility] ([facility]) contacted the caregiver by telephone to perform Ambulatory Care Coordination. Verified name and  (PHI) with caregiver as identifiers. Provided introduction to self, and explanation of the Ambulatory Care Manager's role. Reviewed most recent clinic visit w/ caregiver who verbalized understanding. Caregiver given an opportunity to ask questions. Top Challenges reviewed with the patient   1. Recent subdural hematoma. 2. Spoke to pt's Luci  3. Assisted w/ scheduling f/u PCP visit. 4. Also noted, that pt is out of Plavix, assisted w/ refill. The caregiver agrees to contact the PCP office or the [facility] for questions related to their healthcare. Provided contact information for future reference. Disease Specific:   N/A    Home Health Active: No    DME Active: No    Barriers to care? none    Advance Care Planning:   Does patient have an Advance Directive:  not on file , pt baseline confused    Medication(s):   Medication reconciliation was performed with caregiver, who verbalizes understanding of administration of home medications. There were no barriers to obtaining medications identified at this time. Referral to Pharm D needed: no     Current Outpatient Medications   Medication Sig    escitalopram oxalate (LEXAPRO) 5 mg tablet TAKE ONE-HALF TABLET TO ONE TABLET BY MOUTH DAILY    atorvastatin (LIPITOR) 40 mg tablet TAKE ONE TABLET BY MOUTH DAILY    metoprolol succinate (TOPROL-XL) 50 mg XL tablet Take 1 po daily    cholecalciferol (VITAMIN D3) 1,000 unit tablet Take  by mouth daily.  cyanocobalamin (VITAMIN B-12) 1,000 mcg tablet Take 1,000 mcg by mouth daily.  omeprazole (PRILOSEC) 40 mg capsule Take 40 mg by mouth daily.     losartan (COZAAR) 50 mg tablet TAKE ONE TABLET BY MOUTH TWICE A DAY    ferrous sulfate (IRON) 325 mg (65 mg iron) tablet Take 1 Tab by mouth Daily (before breakfast).  clopidogrel (PLAVIX) 75 mg tab TAKE ONE TABLET BY MOUTH DAILY     No current facility-administered medications for this visit.         [facility] follow up appointment(s):   Future Appointments   Date Time Provider Augusta Coffman   2/18/2020  2:00 PM Quin Fontana MD [address] [place] [address]   3/6/2020  4:15 PM SOL [place]        Non-[facility] follow up appointment(s):     Goals      Attends follow up appointments on schedule      12/18/19 Patient will attend all scheduled appointments through 02/18/20       Knowledge and adherence of prescribed medication (ie. action, side effects, missed dose, etc.).      12/18/19 Pt will take all medications prescribed to be evaluated on each outreach through  02/18/20       Prepare patients and caregivers for end of life decisions (ie. need for hospice, pain management, symptom relief, advance directives etc.)      12/18/19 Pt will complete an ACP and have it scanned into their EMR by 02/18/20

## 2020-01-15 RX ORDER — CLOPIDOGREL BISULFATE 75 MG/1
TABLET ORAL
Qty: 90 TAB | Refills: 1 | Status: SHIPPED | OUTPATIENT
Start: 2020-01-15 | End: 2020-07-04

## 2020-01-27 ENCOUNTER — PATIENT OUTREACH (OUTPATIENT)
Dept: FAMILY MEDICINE CLINIC | Facility: CLINIC | Age: 85
End: 2020-01-27

## 2020-01-27 NOTE — PROGRESS NOTES
Complex Case Management      Date/Time:  2020 12:01 PM    Method of communication with patient:phone    [address] [facility] ([facility]) contacted the caregiver by telephone to perform Ambulatory Care Coordination. Verified name and  (PHI) with caregiver as identifiers. Provided introduction to self, and explanation of the Ambulatory Care Manager's role. Reviewed most recent clinic visit w/ caregiver who verbalized understanding. Caregiver given an opportunity to ask questions. Top Challenges reviewed with the patient   1. Recent subdural hematoma. 2. Spoke to pt's , Vanesa  3. Caregiver states was able to get plavix as discussed on last outreach. 4. Caregiver states pt doing ok, no issues. The caregiver agrees to contact the PCP office or the [facility] for questions related to their healthcare. Provided contact information for future reference. Disease Specific:   N/A    Home Health Active: No    DME Active: No    Barriers to care? none    Advance Care Planning:   Does patient have an Advance Directive:  not on file , pt baseline confused    Medication(s):   Medication reconciliation was performed with caregiver, who verbalizes understanding of administration of home medications. There were no barriers to obtaining medications identified at this time. Referral to Pharm D needed: no     Current Outpatient Medications   Medication Sig    clopidogreL (PLAVIX) 75 mg tab TAKE ONE TABLET BY MOUTH DAILY    escitalopram oxalate (LEXAPRO) 5 mg tablet TAKE ONE-HALF TABLET TO ONE TABLET BY MOUTH DAILY    atorvastatin (LIPITOR) 40 mg tablet TAKE ONE TABLET BY MOUTH DAILY    metoprolol succinate (TOPROL-XL) 50 mg XL tablet Take 1 po daily    cholecalciferol (VITAMIN D3) 1,000 unit tablet Take  by mouth daily.  cyanocobalamin (VITAMIN B-12) 1,000 mcg tablet Take 1,000 mcg by mouth daily.  omeprazole (PRILOSEC) 40 mg capsule Take 40 mg by mouth daily.     losartan (COZAAR) 50 mg tablet TAKE ONE TABLET BY MOUTH TWICE A DAY    ferrous sulfate (IRON) 325 mg (65 mg iron) tablet Take 1 Tab by mouth Daily (before breakfast). No current facility-administered medications for this visit.         [facility] follow up appointment(s):   Future Appointments   Date Time Provider Augusta Meghna   2/18/2020  2:00 PM Meryle Donning, MD [address] [place] [address]   3/6/2020  4:15 PM SOL Gonzalez        Non-[facility] follow up appointment(s):     Goals      Attends follow up appointments on schedule      12/18/19 Patient will attend all scheduled appointments through 02/18/20       Knowledge and adherence of prescribed medication (ie. action, side effects, missed dose, etc.).      12/18/19 Pt will take all medications prescribed to be evaluated on each outreach through  02/18/20       Prepare patients and caregivers for end of life decisions (ie. need for hospice, pain management, symptom relief, advance directives etc.)      12/18/19 Pt will complete an ACP and have it scanned into their EMR by 02/18/20

## 2020-02-10 ENCOUNTER — PATIENT OUTREACH (OUTPATIENT)
Dept: FAMILY MEDICINE CLINIC | Facility: CLINIC | Age: 85
End: 2020-02-10

## 2020-02-24 ENCOUNTER — PATIENT OUTREACH (OUTPATIENT)
Dept: CASE MANAGEMENT | Age: 85
End: 2020-02-24

## 2020-02-24 NOTE — PROGRESS NOTES
Complex Case Management      Date/Time:  2020 12:01 PM    Method of communication with patient:phone    [address]) contacted the caregiver by telephone to perform Ambulatory Care Coordination. Verified name and  (PHI) with caregiver as identifiers. Provided introduction to self, and explanation of the Ambulatory Care Manager's role. Reviewed most recent clinic visit w/ caregiver who verbalized understanding. Caregiver given an opportunity to ask questions. Top Challenges reviewed with the patient   1. Recent subdural hematoma. 2. Spoke to pt's Alexandra  3. Caregiver states pt doing ok, no issues. The caregiver agrees to contact the PCP office or the [facility] for questions related to their healthcare. Provided contact information for future reference. Disease Specific:   N/A    Home Health Active: No    DME Active: No    Barriers to care? none    Advance Care Planning:   Does patient have an Advance Directive:  not on file , pt baseline confused    Medication(s):   Medication reconciliation was performed with caregiver, who verbalizes understanding of administration of home medications. There were no barriers to obtaining medications identified at this time. Referral to Pharm D needed: no     Current Outpatient Medications   Medication Sig    clopidogreL (PLAVIX) 75 mg tab TAKE ONE TABLET BY MOUTH DAILY    escitalopram oxalate (LEXAPRO) 5 mg tablet TAKE ONE-HALF TABLET TO ONE TABLET BY MOUTH DAILY    atorvastatin (LIPITOR) 40 mg tablet TAKE ONE TABLET BY MOUTH DAILY    metoprolol succinate (TOPROL-XL) 50 mg XL tablet Take 1 po daily    cholecalciferol (VITAMIN D3) 1,000 unit tablet Take  by mouth daily.  cyanocobalamin (VITAMIN B-12) 1,000 mcg tablet Take 1,000 mcg by mouth daily.  omeprazole (PRILOSEC) 40 mg capsule Take 40 mg by mouth daily.     losartan (COZAAR) 50 mg tablet TAKE ONE TABLET BY MOUTH TWICE A DAY    ferrous sulfate (IRON) 325 mg (65 mg iron) tablet Take 1 Tab by mouth Daily (before breakfast). No current facility-administered medications for this visit.         [facility] follow up appointment(s):   Future Appointments   Date Time Provider Augusta Coffman   3/6/2020  4:15 PM SOL [place]   3/23/2020  1:20 PM Blanka Collier  [facility]        Non-[facility] follow up appointment(s):     Goals      Attends follow up appointments on schedule      12/18/19 Patient will attend all scheduled appointments through 02/18/20       Knowledge and adherence of prescribed medication (ie. action, side effects, missed dose, etc.).      12/18/19 Pt will take all medications prescribed to be evaluated on each outreach through  02/18/20       Prepare patients and caregivers for end of life decisions (ie. need for hospice, pain management, symptom relief, advance directives etc.)      12/18/19 Pt will complete an ACP and have it scanned into their EMR by 02/18/20

## 2020-03-13 ENCOUNTER — PATIENT OUTREACH (OUTPATIENT)
Dept: CASE MANAGEMENT | Age: 85
End: 2020-03-13

## 2020-03-13 NOTE — PROGRESS NOTES
Patient has graduated from the Complex Case Management  program on 3/13/20. Patient's symptoms are stable at this time. Patient/family has the ability to self-manage. Care management goals have been completed at this time. No further nurse navigator follow up scheduled. Goals Addressed                 This Visit's Progress     COMPLETED: Attends follow up appointments on schedule        12/18/19 Patient will attend all scheduled appointments through 02/18/20       COMPLETED: Knowledge and adherence of prescribed medication (ie. action, side effects, missed dose, etc.).        12/18/19 Pt will take all medications prescribed to be evaluated on each outreach through  02/18/20       COMPLETED: Prepare patients and caregivers for end of life decisions (ie. need for hospice, pain management, symptom relief, advance directives etc.)        12/18/19 Pt will complete an ACP and have it scanned into their EMR by 02/18/20            Pt has nurse navigator's contact information for any further questions, concerns, or needs.   Patients upcoming visits:    Future Appointments   Date Time Provider Augusta Coffman   3/23/2020  1:20 PM Swati Gilbert MD [address] [place] [address]

## 2020-06-08 RX ORDER — ATORVASTATIN CALCIUM 40 MG/1
TABLET, FILM COATED ORAL
Qty: 90 TAB | Refills: 0 | Status: SHIPPED | OUTPATIENT
Start: 2020-06-08

## 2020-06-08 NOTE — TELEPHONE ENCOUNTER
Requested Prescriptions     Pending Prescriptions Disp Refills    atorvastatin (LIPITOR) 40 mg tablet 53 Tab 0     Patient is 80 yrs old unable to do virtual visit and caretaker is her brother and is unable to assist her

## 2020-07-04 RX ORDER — CLOPIDOGREL BISULFATE 75 MG/1
TABLET ORAL
Qty: 90 TAB | Refills: 0 | Status: SHIPPED | OUTPATIENT
Start: 2020-07-04

## 2020-07-07 NOTE — ED PROVIDER NOTES
EMERGENCY DEPARTMENT HISTORY AND PHYSICAL EXAM    11:34 PM      Date: 6/15/2019  Patient Name: Vivian Leung    History of Presenting Illness     Chief Complaint   Patient presents with    Fall    Laceration         History Provided By: Patient    Chief Complaint: head trauma       Additional History (Context): Vivian Leung is a 80 y.o. female with history of CAD, mitral valve prolapse, hyperlipidemia, MI, anemia who presents with head trauma after fall. She slipped in the gravel of her driveway while getting her mail, fell forward cutting her forehead. Large amounts of blood coming from the scalp. Bleeding is uncontrolled. Patient denies loss of consciousness, headache, dizziness, lightheadedness, vision changes, numbness, weakness. She also denies neck pain. She did not feel lightheaded or dizzy or have an episode of syncope; this was a mechanical fall. PCP: Kristin Garner MD    Current Outpatient Medications   Medication Sig Dispense Refill    tolterodine ER (DETROL LA) 4 mg ER capsule TAKE ONE CAPSULE BY MOUTH DAILY 30 Cap 3    clopidogrel (PLAVIX) 75 mg tab TAKE ONE TABLET BY MOUTH DAILY 90 Tab 0    atorvastatin (LIPITOR) 40 mg tablet TAKE ONE TABLET BY MOUTH DAILY 53 Tab 0    metoprolol succinate (TOPROL-XL) 50 mg XL tablet TAKE ONE TABLET BY MOUTH DAILY 30 Tab 0    losartan (COZAAR) 50 mg tablet TAKE ONE TABLET BY MOUTH TWICE A  Tab 3    escitalopram oxalate (LEXAPRO) 5 mg tablet TAKE ONE-HALF TO ONE TABLET BY MOUTH DAILY 90 Tab 0    atorvastatin (LIPITOR) 40 mg tablet TAKE ONE TABLET BY MOUTH DAILY 53 Tab 0    meclizine (ANTIVERT) 12.5 mg tablet Take 1 Tab by mouth two (2) times daily as needed. 40 Tab 2    clopidogrel (PLAVIX) 75 mg tab TAKE ONE TABLET BY MOUTH DAILY 90 Tab 0    ferrous sulfate (IRON) 325 mg (65 mg iron) tablet Take 1 Tab by mouth Daily (before breakfast).  30 Tab 2    omeprazole (PRILOSEC) 40 mg capsule 1 capsule each AM 30 Cap 2    cyanocobalamin Redness, swelling to left labia, 0 open wounds noted, redness, swelling to left mid abd, redness marked with skin marker,  jaime macedo aware of it.      [facility], RN  07/06/20 [id number] [facility], RN  07/07/20 9396 (VITAMIN B-12) 1,000 mcg tablet Take 1,000 mcg by mouth daily.  cholecalciferol, vitamin d3, (VITAMIN D) 1,000 unit tablet Take 1,000 Units by mouth daily.  aspirin 81 mg tablet Take 81 mg by mouth daily. Past History     Past Medical History:  Past Medical History:   Diagnosis Date    Cardiac ambulatory blood pressure monitoring 10/25/2011    JNC-7 classification:  Stage 1 hypertension.  Cardiac cath 02/22/2010    mRCA 35%. LM patent. Cx 25%. pLAD 100% (2.75 x 23 mm Cypher stent) LVEDP  20.  EF 45%. Anteroapical mod HK    Cardiac echocardiogram 05/11/2010    EF 60%. Gr 2 DDfx. LAE. Mild MR.  Mild-mod PI; PASP 30 mmHg.  Carotid duplex 08/26/2013    Mild 1-49% bilateral ICA stenosis. Biphasic signals in both subclavian arteries.  Coronary artery disease     acute anterior ST-elevation myocardial infarction/primary stenting of the LAD on 02/22/10/EF 45%.  Coronary atherosclerosis of unspecified type of bypass graft(414.05)     Depressive disorder     Fatigue     Headache(784.0)     Heart disease, unspecified     Iron deficiency anemia, unspecified     Migraine     Migraine headache     Myocardial infarction ([address] 02/22/2010    Acute anterior wall w/primary intubation, stenting of prox LAD w/ 2.75-mm Cypher stent. 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  Senile cataract, unspecified     Valvular heart disease     Mitral valve prolapse. Past Surgical History:  Past Surgical History:   Procedure Laterality Date    HX APPENDECTOMY      HX BREAST LUMPECTOMY      Left.  HX CATARACT REMOVAL  5/1/2013, 5/8/2013    left and right    HX CORONARY STENT PLACEMENT  2/22/10    prox LAD w/ 2.75-mm Cypher stent.     HX HEART CATHETERIZATION      HX TONSILLECTOMY         Family History:  Family History   Problem Relation Age of Onset    Heart Disease Mother     Heart Disease Father        Social History:  Social History     Tobacco Use    Smoking status: Never Smoker    Smokeless tobacco: Never Used   Substance Use Topics    Alcohol use: No    Drug use: No       Allergies: Allergies   Allergen Reactions    Viibryd [Vilazodone] Anaphylaxis and Vertigo    Coreg [Carvedilol] Other (comments)     Pt states coreg makes her feel like a \"zombie\"    Erythromycin Nausea Only     Severe.  Penicillins Hives         Review of Systems       Review of Systems   Constitutional: Negative for fever. HENT: Negative for facial swelling. Eyes: Negative for visual disturbance. Respiratory: Negative for shortness of breath. Cardiovascular: Negative for chest pain. Gastrointestinal: Negative for abdominal pain. Genitourinary: Negative for dysuria. Musculoskeletal: Negative for neck pain. Skin: Positive for wound. Negative for rash. Neurological: Negative for dizziness. Psychiatric/Behavioral: Negative for confusion. All other systems reviewed and are negative. Physical Exam     Visit Vitals  /59   Pulse (!) 54   Temp 98.1 °F (36.7 °C)   Resp 14   SpO2 92%         Physical Exam   Constitutional: She is oriented to person, place, and time. She appears well-developed and well-nourished. No distress. HENT:   Head: Normocephalic and atraumatic. Eyes: Conjunctivae are normal.   Neck: Normal range of motion. Cardiovascular: Normal rate and regular rhythm. Pulmonary/Chest: Effort normal.   Abdominal: She exhibits no distension. Musculoskeletal: Normal range of motion. Neurological: She is alert and oriented to person, place, and time. She has normal strength. No cranial nerve deficit or sensory deficit. She displays a negative Romberg sign. Coordination normal.   Skin: Skin is warm and dry. She is not diaphoretic.   5mm wound to left temple with mild hematoma and active bleeding.   Blood caked on hair but no active bleeding visible from anywhere else on the scalp    Psychiatric: She has a normal mood and affect. Nursing note and vitals reviewed. Diagnostic Study Results     Labs -  No results found for this or any previous visit (from the past 12 hour(s)). Radiologic Studies -   CT HEAD WO CONT   Final Result   IMPRESSION[de-identified]   1.  No acute intracranial pathology. Thank you for this referral.            Medical Decision Making   I am the first provider for this patient. I reviewed the vital signs, available nursing notes, past medical history, past surgical history, family history and social history. Vital Signs-Reviewed the patient's vital signs. Records Reviewed: Nursing Notes (Time of Review: 11:34 PM)    ED Course: Progress Notes, Reevaluation, and Consults:      Provider Notes (Medical Decision Making): MDM  Number of Diagnoses or Management Options  Laceration of scalp, initial encounter:   Traumatic injury of head, initial encounter:   Diagnosis management comments: Small laceration to left temple with large amount of bleeding. Bleeding controlled with Quikclot and application of one suture. No other visible lacerations to scalp. Head CT negative for bleed. Patient is alert and oriented with no neurologic deficits on exam.  She did have a vasovagal episode while getting the lidocaine which caused her vital signs to drop but quickly returned to normal shortly thereafter. Fluids were given. Discussed treatment plan, return precautions, symptomatic relief, and expected time to improvement. All questions answered. Patient is stable for discharge and outpatient management. Diagnosis     Clinical Impression:   1. Traumatic injury of head, initial encounter    2.  Laceration of scalp, initial encounter        Disposition: Discharged      Follow-up Information     Follow up With Specialties Details Why Contact Info    Jessee Nash MD Internal Medicine In 1 week For suture removal [address] [place] [address] [place]      [facility] EMERGENCY DEPT Emergency Medicine  Immediately if symptoms worsen [address] [id number]  [phone]           Discharge Medication List as of 6/15/2019 11:32 PM      CONTINUE these medications which have NOT CHANGED    Details   tolterodine ER (DETROL LA) 4 mg ER capsule TAKE ONE CAPSULE BY MOUTH DAILY, Normal, Disp-30 Cap, R-3      !! clopidogrel (PLAVIX) 75 mg tab TAKE ONE TABLET BY MOUTH DAILY, Normal, Disp-90 Tab, R-0      !! atorvastatin (LIPITOR) 40 mg tablet TAKE ONE TABLET BY MOUTH DAILY, Normal, Disp-53 Tab, R-0      metoprolol succinate (TOPROL-XL) 50 mg XL tablet TAKE ONE TABLET BY MOUTH DAILY, Normal, Disp-30 Tab, R-0      losartan (COZAAR) 50 mg tablet TAKE ONE TABLET BY MOUTH TWICE A DAY, Normal, Disp-180 Tab, R-3      escitalopram oxalate (LEXAPRO) 5 mg tablet TAKE ONE-HALF TO ONE TABLET BY MOUTH DAILY, Normal, Disp-90 Tab, R-0      !! atorvastatin (LIPITOR) 40 mg tablet TAKE ONE TABLET BY MOUTH DAILY, Normal, Disp-53 Tab, R-0      meclizine (ANTIVERT) 12.5 mg tablet Take 1 Tab by mouth two (2) times daily as needed., Normal, Disp-40 Tab, R-2      !! clopidogrel (PLAVIX) 75 mg tab TAKE ONE TABLET BY MOUTH DAILY, Normal, Disp-90 Tab, R-0      ferrous sulfate (IRON) 325 mg (65 mg iron) tablet Take 1 Tab by mouth Daily (before breakfast). , Normal, Disp-30 Tab, R-2      omeprazole (PRILOSEC) 40 mg capsule 1 capsule each AM, Normal, Disp-30 Cap, R-2      cyanocobalamin (VITAMIN B-12) 1,000 mcg tablet Take 1,000 mcg by mouth daily. , Historical Med      cholecalciferol, vitamin d3, (VITAMIN D) 1,000 unit tablet Take 1,000 Units by mouth daily. , Historical Med      aspirin 81 mg tablet Take 81 mg by mouth daily. , Historical Med       !! - Potential duplicate medications found.  Please discuss with provider.        _______________________________    Attestations:  Scribe Attestation     Hi Black PA-C acting as a scribe for and in the presence of Laurie Rubio PA-C      June 16, 2019 at 12:00 AM       Provider Attestation:      I personally performed the services described in the documentation, reviewed the documentation, as recorded by the scribe in my presence, and it accurately and completely records my words and actions.  June 16, 2019 at 12:00 AM - Laurie Rubio PA-C  _______________________________

## 2021-02-04 NOTE — TELEPHONE ENCOUNTER
Patient called for lab results.   Please call her at [phone] Principal Discharge DX:	Painless hematuria

## 2021-02-18 ENCOUNTER — IMMUNIZATION (OUTPATIENT)
Dept: INTERNAL MEDICINE CLINIC | Age: 86
End: 2021-02-18
Payer: MEDICARE

## 2021-02-18 DIAGNOSIS — Z23 ENCOUNTER FOR IMMUNIZATION: Primary | ICD-10-CM

## 2021-02-18 PROCEDURE — 91301 COVID-19, MRNA, LNP-S, PF, 100MCG/0.5ML DOSE(MODERNA): CPT | Performed by: FAMILY MEDICINE

## 2021-02-18 PROCEDURE — 0011A COVID-19, MRNA, LNP-S, PF, 100MCG/0.5ML DOSE(MODERNA): CPT | Performed by: FAMILY MEDICINE

## 2021-03-12 ENCOUNTER — HOSPITAL ENCOUNTER (OUTPATIENT)
Dept: LAB | Age: 86
Discharge: HOME | End: 2021-03-12
Payer: MEDICARE

## 2021-03-12 PROCEDURE — 88305 TISSUE EXAM BY PATHOLOGIST: CPT

## 2021-03-18 ENCOUNTER — DOCUMENTATION ONLY (OUTPATIENT)
Dept: INTERNAL MEDICINE CLINIC | Age: 86
End: 2021-03-18

## 2021-03-18 ENCOUNTER — IMMUNIZATION (OUTPATIENT)
Dept: INTERNAL MEDICINE CLINIC | Age: 86
End: 2021-03-18
Payer: MEDICARE

## 2021-03-18 DIAGNOSIS — Z23 ENCOUNTER FOR IMMUNIZATION: Primary | ICD-10-CM

## 2021-03-18 PROCEDURE — 0012A COVID-19, MRNA, LNP-S, PF, 100MCG/0.5ML DOSE(MODERNA): CPT | Performed by: FAMILY MEDICINE

## 2021-03-18 PROCEDURE — 91301 COVID-19, MRNA, LNP-S, PF, 100MCG/0.5ML DOSE(MODERNA): CPT | Performed by: FAMILY MEDICINE

## 2021-03-18 NOTE — PROGRESS NOTES
Chief Complaint   Patient presents with    Immunization/Injection     [facility]     Patient presents for the following vaccines: Moderna Covid Vaccine #2. Patient consent obtained by patient. Patient tolerated procedure well at left deltoid. Patient observed for a period of 15 minutes post injection to ensure no reaction.        Lot # [id number]  Exp: 04/18/21  Rosalba Wilson

## 2022-02-24 NOTE — PROGRESS NOTES
Patient presented to office for flu shot. Allergies noted. Patient well and consenting to injection. Vaccine given intramuscular in left deltoid. Patient tolerated injection well and left office ambulatory.
negative...

## 2022-03-19 PROBLEM — R26.9 GAIT DISTURBANCE: Status: ACTIVE | Noted: 2018-06-29

## 2022-03-19 PROBLEM — S06.5XAA SUBDURAL HEMATOMA (HCC): Status: ACTIVE | Noted: 2019-09-20

## 2022-08-25 ENCOUNTER — HOSPITAL ENCOUNTER (EMERGENCY)
Age: 87
Discharge: ANOTHER HOSPITAL | End: 2022-08-26
Attending: EMERGENCY MEDICINE
Payer: MEDICARE

## 2022-08-25 ENCOUNTER — APPOINTMENT (OUTPATIENT)
Dept: GENERAL RADIOLOGY | Age: 87
End: 2022-08-25
Attending: STUDENT IN AN ORGANIZED HEALTH CARE EDUCATION/TRAINING PROGRAM
Payer: MEDICARE

## 2022-08-25 ENCOUNTER — APPOINTMENT (OUTPATIENT)
Dept: CT IMAGING | Age: 87
End: 2022-08-25
Attending: STUDENT IN AN ORGANIZED HEALTH CARE EDUCATION/TRAINING PROGRAM
Payer: MEDICARE
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
## 2022-08-25 DIAGNOSIS — R41.0 CONFUSION: ICD-10-CM

## 2022-08-25 DIAGNOSIS — U07.1 COVID-19: ICD-10-CM

## 2022-08-25 DIAGNOSIS — S06.5XAA SUBDURAL HEMATOMA: ICD-10-CM

## 2022-08-25 LAB
ALBUMIN SERPL-MCNC: 3.1 G/DL (ref 3.4–5)
ALBUMIN/GLOB SERPL: 0.9 {RATIO} (ref 0.8–1.7)
ALP SERPL-CCNC: 65 U/L (ref 45–117)
ALT SERPL-CCNC: 25 U/L (ref 13–56)
ANION GAP SERPL CALC-SCNC: 7 MMOL/L (ref 3–18)
AST SERPL-CCNC: 33 U/L (ref 10–38)
BASOPHILS # BLD: 0.1 K/UL (ref 0–0.1)
BASOPHILS NFR BLD: 1 % (ref 0–2)
BILIRUB SERPL-MCNC: 0.3 MG/DL (ref 0.2–1)
BUN SERPL-MCNC: 29 MG/DL (ref 7–18)
BUN/CREAT SERPL: 25 (ref 12–20)
CALCIUM SERPL-MCNC: 8.7 MG/DL (ref 8.5–10.1)
CHLORIDE SERPL-SCNC: 106 MMOL/L (ref 100–111)
CO2 SERPL-SCNC: 24 MMOL/L (ref 21–32)
CREAT SERPL-MCNC: 1.18 MG/DL (ref 0.6–1.3)
DIFFERENTIAL METHOD BLD: ABNORMAL
EOSINOPHIL # BLD: 0 K/UL (ref 0–0.4)
EOSINOPHIL NFR BLD: 0 % (ref 0–5)
ERYTHROCYTE [DISTWIDTH] IN BLOOD BY AUTOMATED COUNT: 14.5 % (ref 11.6–14.5)
GLOBULIN SER CALC-MCNC: 3.4 G/DL (ref 2–4)
GLUCOSE SERPL-MCNC: 120 MG/DL (ref 74–99)
HCT VFR BLD AUTO: 33.2 % (ref 35–45)
HGB BLD-MCNC: 10.9 G/DL (ref 12–16)
IMM GRANULOCYTES # BLD AUTO: 0 K/UL (ref 0–0.04)
IMM GRANULOCYTES NFR BLD AUTO: 0 % (ref 0–0.5)
LYMPHOCYTES # BLD: 1 K/UL (ref 0.9–3.6)
LYMPHOCYTES NFR BLD: 15 % (ref 21–52)
MAGNESIUM SERPL-MCNC: 2.4 MG/DL (ref 1.6–2.6)
MCH RBC QN AUTO: 30.7 PG (ref 24–34)
MCHC RBC AUTO-ENTMCNC: 32.8 G/DL (ref 31–37)
MCV RBC AUTO: 93.5 FL (ref 78–100)
MONOCYTES # BLD: 0.8 K/UL (ref 0.05–1.2)
MONOCYTES NFR BLD: 12 % (ref 3–10)
NEUTS SEG # BLD: 5.1 K/UL (ref 1.8–8)
NEUTS SEG NFR BLD: 73 % (ref 40–73)
NRBC # BLD: 0 K/UL (ref 0–0.01)
NRBC BLD-RTO: 0 PER 100 WBC
PLATELET # BLD AUTO: 124 K/UL (ref 135–420)
PMV BLD AUTO: 11.1 FL (ref 9.2–11.8)
POTASSIUM SERPL-SCNC: 5 MMOL/L (ref 3.5–5.5)
PROT SERPL-MCNC: 6.5 G/DL (ref 6.4–8.2)
RBC # BLD AUTO: 3.55 M/UL (ref 4.2–5.3)
SODIUM SERPL-SCNC: 137 MMOL/L (ref 136–145)
TROPONIN-HIGH SENSITIVITY: 233 NG/L (ref 0–54)
WBC # BLD AUTO: 7 K/UL (ref 4.6–13.2)

## 2022-08-25 PROCEDURE — 93005 ELECTROCARDIOGRAM TRACING: CPT

## 2022-08-25 PROCEDURE — 71045 X-RAY EXAM CHEST 1 VIEW: CPT

## 2022-08-25 PROCEDURE — 70450 CT HEAD/BRAIN W/O DYE: CPT

## 2022-08-25 PROCEDURE — 80053 COMPREHEN METABOLIC PANEL: CPT

## 2022-08-25 PROCEDURE — 84484 ASSAY OF TROPONIN QUANT: CPT

## 2022-08-25 PROCEDURE — 85025 COMPLETE CBC W/AUTO DIFF WBC: CPT

## 2022-08-25 PROCEDURE — 83735 ASSAY OF MAGNESIUM: CPT

## 2022-08-26 ENCOUNTER — APPOINTMENT (OUTPATIENT)
Dept: CT IMAGING | Age: 87
End: 2022-08-26
Attending: STUDENT IN AN ORGANIZED HEALTH CARE EDUCATION/TRAINING PROGRAM
Payer: MEDICARE

## 2022-08-26 VITALS
BODY MASS INDEX: 19.08 KG/M2 | DIASTOLIC BLOOD PRESSURE: 57 MMHG | SYSTOLIC BLOOD PRESSURE: 143 MMHG | WEIGHT: 97.2 LBS | HEART RATE: 115 BPM | HEIGHT: 60 IN | RESPIRATION RATE: 16 BRPM | TEMPERATURE: 98.8 F | OXYGEN SATURATION: 100 %

## 2022-08-26 LAB
APPEARANCE UR: CLEAR
ATRIAL RATE: 66 BPM
BACTERIA URNS QL MICRO: ABNORMAL /HPF
BILIRUB UR QL: NEGATIVE
CALCULATED P AXIS, ECG09: 47 DEGREES
CALCULATED R AXIS, ECG10: -12 DEGREES
CALCULATED T AXIS, ECG11: 83 DEGREES
COLOR UR: YELLOW
DIAGNOSIS, 93000: NORMAL
EPITH CASTS URNS QL MICRO: ABNORMAL /LPF (ref 0–5)
FLUAV RNA SPEC QL NAA+PROBE: NOT DETECTED
FLUBV RNA SPEC QL NAA+PROBE: NOT DETECTED
GLUCOSE UR STRIP.AUTO-MCNC: NEGATIVE MG/DL
HGB UR QL STRIP: NEGATIVE
HYALINE CASTS URNS QL MICRO: ABNORMAL /LPF (ref 0–2)
KETONES UR QL STRIP.AUTO: NEGATIVE MG/DL
LEUKOCYTE ESTERASE UR QL STRIP.AUTO: NEGATIVE
MUCOUS THREADS URNS QL MICRO: NEGATIVE /LPF
NITRITE UR QL STRIP.AUTO: NEGATIVE
P-R INTERVAL, ECG05: 188 MS
PH UR STRIP: 5.5 [PH] (ref 5–8)
PROT UR STRIP-MCNC: ABNORMAL MG/DL
Q-T INTERVAL, ECG07: 400 MS
QRS DURATION, ECG06: 70 MS
QTC CALCULATION (BEZET), ECG08: 419 MS
RBC #/AREA URNS HPF: ABNORMAL /HPF (ref 0–5)
SARS-COV-2, COV2: DETECTED
SP GR UR REFRACTOMETRY: 1.02 (ref 1–1.03)
TROPONIN-HIGH SENSITIVITY: 282 NG/L (ref 0–54)
UROBILINOGEN UR QL STRIP.AUTO: 0.2 EU/DL (ref 0.2–1)
VENTRICULAR RATE, ECG03: 66 BPM
WBC URNS QL MICRO: ABNORMAL /HPF (ref 0–4)

## 2022-08-26 PROCEDURE — 72125 CT NECK SPINE W/O DYE: CPT

## 2022-08-26 PROCEDURE — 84484 ASSAY OF TROPONIN QUANT: CPT

## 2022-08-26 PROCEDURE — 87636 SARSCOV2 & INF A&B AMP PRB: CPT

## 2022-08-26 PROCEDURE — 51701 INSERT BLADDER CATHETER: CPT

## 2022-08-26 PROCEDURE — 99285 EMERGENCY DEPT VISIT HI MDM: CPT

## 2022-08-26 PROCEDURE — 81001 URINALYSIS AUTO W/SCOPE: CPT

## 2022-08-26 PROCEDURE — 96360 HYDRATION IV INFUSION INIT: CPT

## 2022-08-26 PROCEDURE — 74011250636 HC RX REV CODE- 250/636: Performed by: STUDENT IN AN ORGANIZED HEALTH CARE EDUCATION/TRAINING PROGRAM

## 2022-08-26 RX ADMIN — SODIUM CHLORIDE, SODIUM LACTATE, POTASSIUM CHLORIDE, AND CALCIUM CHLORIDE 500 ML: 600; 310; 30; 20 INJECTION, SOLUTION INTRAVENOUS at 00:00

## 2022-08-26 NOTE — ED TRIAGE NOTES
Pt arrived via EMS from home after assisted fall to floor with caregiver. No LOC, did not bump head.  Caregiver did state pt was weaker than normal.

## 2022-08-26 NOTE — ED PROVIDER NOTES
EMERGENCY DEPARTMENT HISTORY AND PHYSICAL EXAM      Date: 8/25/2022  Patient Name: Hector Fields    History of Presenting Illness     Chief Complaint   Patient presents with    Fall    Extremity Weakness         History Provided By: Patient and caregiver     Chief Complaint: Fall from standing, confusion, weakness  Duration: 1 day  Timing: Acute onset  Location: Generalized  Quality: Generalized weakness  Severity: Mild  Modifying Factors: None  Associated Symptoms: Intermittent dry cough      History (Context): Hector Fields is a 80 y.o. female with a past medical history significant for significant coronary artery disease, mild mitral regurgitation, MI in 2010, hypercholesterolemia, peripheral neuropathy comes into the ED today due to fall from standing today witnessed with caregivers. Reports that she was standing out of a chair when she became very weak and fell down, was assisted some by the caregiver. It was reported that she struck her head, no loss consciousness, no seizure activity. They did note that she had more confusion and weakness today and she was sent here to the ER for evaluation. PCP: Johnna Flores MD    Current Outpatient Medications   Medication Sig Dispense Refill    clopidogreL (PLAVIX) 75 mg tab TAKE ONE TABLET BY MOUTH DAILY 90 Tab 0    atorvastatin (LIPITOR) 40 mg tablet TAKE ONE TABLET BY MOUTH DAILY 90 Tab 0    escitalopram oxalate (LEXAPRO) 5 mg tablet TAKE ONE-HALF TABLET TO ONE TABLET BY MOUTH DAILY 90 Tab 0    metoprolol succinate (TOPROL-XL) 50 mg XL tablet Take 1 po daily 30 Tab 2    cholecalciferol (VITAMIN D3) 1,000 unit tablet Take  by mouth daily.  cyanocobalamin (VITAMIN B-12) 1,000 mcg tablet Take 1,000 mcg by mouth daily.  omeprazole (PRILOSEC) 40 mg capsule Take 40 mg by mouth daily.       losartan (COZAAR) 50 mg tablet TAKE ONE TABLET BY MOUTH TWICE A  Tab 3    ferrous sulfate (IRON) 325 mg (65 mg iron) tablet Take 1 Tab by mouth Daily (before breakfast). 30 Tab 2       Past History     Past Medical History:   Past Medical History:   Diagnosis Date    Cardiac ambulatory blood pressure monitoring 10/25/2011    JNC-7 classification:  Stage 1 hypertension.  Cardiac cath 02/22/2010    mRCA 35%. LM patent. Cx 25%. pLAD 100% (2.75 x 23 mm Cypher stent) LVEDP  20.  EF 45%. Anteroapical mod HK    Cardiac echocardiogram 05/11/2010    EF 60%. Gr 2 DDfx. LAE. Mild MR.  Mild-mod PI; PASP 30 mmHg.  Carotid duplex 08/26/2013    Mild 1-49% bilateral ICA stenosis. Biphasic signals in both subclavian arteries.  Coronary artery disease     acute anterior ST-elevation myocardial infarction/primary stenting of the LAD on 02/22/10/EF 45%.  Coronary atherosclerosis of unspecified type of bypass graft(414.05)     Depressive disorder     Fatigue     Headache(784.0)     Heart disease, unspecified     Iron deficiency anemia, unspecified     Migraine     Migraine headache     Myocardial infarction ([address] 02/22/2010    Acute anterior wall w/primary intubation, stenting of prox LAD w/ 2.75-mm Cypher stent. 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  [facility]ural hematoma[facility]) 09/19/2019    Senile cataract, unspecified     Valvular heart disease     Mitral valve prolapse. Past Surgical History:  Past Surgical History:   Procedure Laterality Date    HX APPENDECTOMY      HX BREAST LUMPECTOMY      Left.  HX CATARACT REMOVAL  5/1/2013, 5/8/2013    left and right    HX CORONARY STENT PLACEMENT  2/22/10    prox LAD w/ 2.75-mm Cypher stent.     HX HEART CATHETERIZATION      HX TONSILLECTOMY         Family History:  Family History   Problem Relation Age of Onset    Heart Disease Mother     Heart Disease Father        Social History:   Social History     Tobacco Use    Smoking status: Never  Smokeless tobacco: Never   Substance Use Topics    Alcohol use: No    Drug use: No       Allergies: Allergies   Allergen Reactions    Viibryd [Vilazodone] Anaphylaxis and Vertigo    Coreg [Carvedilol] Other (comments)     Pt states coreg makes her feel like a \"zombie\"    Erythromycin Nausea Only     Severe.  Penicillins Hives       PMH, PSH, family history, social history, allergies reviewed with the patient with significant items noted above. Review of Systems   Review of Systems   Constitutional:  Positive for fatigue. Negative for chills and fever. HENT:  Negative for congestion, rhinorrhea, sinus pressure, sore throat and trouble swallowing. Eyes:  Negative for photophobia and visual disturbance. Respiratory:  Negative for cough, chest tightness and shortness of breath. Cardiovascular:  Negative for chest pain, palpitations and leg swelling. Gastrointestinal:  Negative for abdominal distention, abdominal pain, blood in stool, diarrhea, nausea and vomiting. Genitourinary:  Negative for decreased urine volume, dysuria and hematuria. Musculoskeletal:  Negative for myalgias and neck pain. Skin:  Negative for rash and wound. Neurological:  Negative for tremors, seizures, syncope, weakness, light-headedness, numbness and headaches. Psychiatric/Behavioral:  Positive for confusion. Negative for self-injury and sleep disturbance. The patient is not nervous/anxious. All other systems reviewed and are negative. Physical Exam     Vitals:    08/25/22 2156 08/25/22 2310 08/25/22 2326 08/25/22 2356   BP: 132/69 134/61 (!) 140/50 (!) 148/116   Pulse:       Resp:       Temp:       SpO2:   98% 97%   Weight:       Height:           Physical Exam  Vitals and nursing note reviewed. Constitutional:       General: She is not in acute distress. Appearance: Normal appearance. HENT:      Head: Normocephalic and atraumatic.       Mouth/Throat:      Mouth: Mucous membranes are moist.   Eyes: General: Visual field deficit (Chronic) present. No scleral icterus. Conjunctiva/sclera: Conjunctivae normal.   Cardiovascular:      Rate and Rhythm: Normal rate and regular rhythm. Comments: Normal peripheral perfusion  Pulmonary:      Effort: Pulmonary effort is normal.      Breath sounds: Normal breath sounds. Abdominal:      General: There is no distension. Palpations: Abdomen is soft. Tenderness: There is no abdominal tenderness. Musculoskeletal:         General: No deformity. Normal range of motion. Cervical back: Normal range of motion and neck supple. Skin:     General: Skin is warm and dry. Findings: No rash. Neurological:      General: No focal deficit present. Mental Status: She is alert. She is disoriented and confused. GCS: GCS eye subscore is 4. GCS verbal subscore is 4. GCS motor subscore is 6. Cranial Nerves: No cranial nerve deficit, dysarthria or facial asymmetry. Sensory: Sensation is intact. Motor: Tremor (Mild, rhythmic hand tremor, intermittent) present. Psychiatric:         Mood and Affect: Mood normal.         Thought Content: Thought content normal.       Diagnostic Study Results     Labs -     Recent Results (from the past 12 hour(s))   CBC WITH AUTOMATED DIFF    Collection Time: 08/25/22 10:40 PM   Result Value Ref Range    WBC 7.0 4.6 - 13.2 K/uL    RBC 3.55 (L) 4.20 - 5.30 M/uL    HGB 10.9 (L) 12.0 - 16.0 g/dL    HCT 33.2 (L) 35.0 - 45.0 %    MCV 93.5 78.0 - 100.0 FL    MCH 30.7 24.0 - 34.0 PG    MCHC 32.8 31.0 - 37.0 g/dL    RDW 14.5 11.6 - 14.5 %    PLATELET 565 (L) 460 - 420 K/uL    MPV 11.1 9.2 - 11.8 FL    NRBC 0.0 0  WBC    ABSOLUTE NRBC 0.00 0.00 - 0.01 K/uL    NEUTROPHILS 73 40 - 73 %    LYMPHOCYTES 15 (L) 21 - 52 %    MONOCYTES 12 (H) 3 - 10 %    EOSINOPHILS 0 0 - 5 %    BASOPHILS 1 0 - 2 %    IMMATURE GRANULOCYTES 0 0.0 - 0.5 %    ABS. NEUTROPHILS 5.1 1.8 - 8.0 K/UL    ABS.  LYMPHOCYTES 1.0 0.9 - 3.6 K/UL    ABS. MONOCYTES 0.8 0.05 - 1.2 K/UL    ABS. EOSINOPHILS 0.0 0.0 - 0.4 K/UL    ABS. BASOPHILS 0.1 0.0 - 0.1 K/UL    ABS. IMM. GRANS. 0.0 0.00 - 0.04 K/UL    DF AUTOMATED     METABOLIC PANEL, COMPREHENSIVE    Collection Time: 08/25/22 10:40 PM   Result Value Ref Range    Sodium 137 136 - 145 mmol/L    Potassium 5.0 3.5 - 5.5 mmol/L    Chloride 106 100 - 111 mmol/L    CO2 24 21 - 32 mmol/L    Anion gap 7 3.0 - 18 mmol/L    Glucose 120 (H) 74 - 99 mg/dL    BUN 29 (H) 7.0 - 18 MG/DL    Creatinine 1.18 0.6 - 1.3 MG/DL    BUN/Creatinine ratio 25 (H) 12 - 20      GFR est AA 51 (L) >60 ml/min/1.73m2    GFR est non-AA 42 (L) >60 ml/min/1.73m2    Calcium 8.7 8.5 - 10.1 MG/DL    Bilirubin, total 0.3 0.2 - 1.0 MG/DL    ALT (SGPT) 25 13 - 56 U/L    AST (SGOT) 33 10 - 38 U/L    Alk.  phosphatase 65 45 - 117 U/L    Protein, total 6.5 6.4 - 8.2 g/dL    Albumin 3.1 (L) 3.4 - 5.0 g/dL    Globulin 3.4 2.0 - 4.0 g/dL    A-G Ratio 0.9 0.8 - 1.7     TROPONIN-HIGH SENSITIVITY    Collection Time: 08/25/22 10:40 PM   Result Value Ref Range    Troponin-High Sensitivity 233 (HH) 0 - 54 ng/L   MAGNESIUM    Collection Time: 08/25/22 10:40 PM   Result Value Ref Range    Magnesium 2.4 1.6 - 2.6 mg/dL   EKG, 12 LEAD, INITIAL    Collection Time: 08/25/22 10:49 PM   Result Value Ref Range    Ventricular Rate 66 BPM    Atrial Rate 66 BPM    P-R Interval 188 ms    QRS Duration 70 ms    Q-T Interval 400 ms    QTC Calculation (Bezet) 419 ms    Calculated P Axis 47 degrees    Calculated R Axis -12 degrees    Calculated T Axis 83 degrees    Diagnosis       Normal sinus rhythm  Low voltage QRS  Borderline ECG  When compared with ECG of 20-SEP-2019 14:11,  premature atrial complexes are no longer present     COVID-19 WITH INFLUENZA A/B    Collection Time: 08/26/22 12:25 AM   Result Value Ref Range    SARS-CoV-2 by PCR Detected (AA) NOTD      Influenza A by PCR Not detected NOTD      Influenza B by PCR Not detected NOTD        Labs Reviewed   COVID-19 WITH INFLUENZA A/B - Abnormal; Notable for the following components:       Result Value    SARS-CoV-2 by PCR Detected (*)     All other components within normal limits   CBC WITH AUTOMATED DIFF - Abnormal; Notable for the following components:    RBC 3.55 (*)     HGB 10.9 (*)     HCT 33.2 (*)     PLATELET 375 (*)     LYMPHOCYTES 15 (*)     MONOCYTES 12 (*)     All other components within normal limits   METABOLIC PANEL, COMPREHENSIVE - Abnormal; Notable for the following components:    Glucose 120 (*)     BUN 29 (*)     BUN/Creatinine ratio 25 (*)     GFR est AA 51 (*)     GFR est non-AA 42 (*)     Albumin 3.1 (*)     All other components within normal limits   TROPONIN-HIGH SENSITIVITY - Abnormal; Notable for the following components:    Troponin-High Sensitivity 233 (*)     All other components within normal limits   MAGNESIUM   TROPONIN-HIGH SENSITIVITY   URINALYSIS W/ RFLX MICROSCOPIC       Radiologic Studies -   CT SPINE CERV WO CONT   Final Result      No acute fracture identified. C4-C5 grade 1 anterolisthesis likely degenerative   in nature. Advanced degenerative changes summarized above. CT HEAD WO CONT   Final Result      Very subtle finding suggestive of small left subdural hematoma overlying the   frontal convexities.   -No associated mass effect. Prior right subdural hematoma is resolved. No CT finding for acute territorial infarct . Report called to the covering ED physician on 8/25/22 at 2340 hours. XR CHEST PORT   Final Result      Hypoinflation of the lungs without definite radiographic finding for an acute   cardiopulmonary process. CT Results  (Last 48 hours)                 08/26/22 0102  CT SPINE CERV WO CONT Final result    Impression:      No acute fracture identified. C4-C5 grade 1 anterolisthesis likely degenerative   in nature. Advanced degenerative changes summarized above.        Narrative:  EXAMINATION: CT cervical spine without contrast       INDICATION: Fall, altered mental status       COMPARISON: None       TECHNIQUE: CT cervical spine without contrast. All CT scans at this facility are   performed using dose optimization technique as appropriate to a performed exam,   to include automated exposure control, adjustment of the mA and/or kV according   to patient size (including appropriate matching first site specific   examinations), or use of iterative reconstruction technique. FINDINGS:       General: Vertebral body heights preserved. Disc space loss and endplate spurring   at multiple levels, most pronounced at C5-C7. Advanced atlantodental   degenerative changes. No significant prevertebral edema. Facet arthropathy most   advanced at right C3, bilateral C3-C4, bilateral C4-C5, right C5-C6, left C7-T1. C4-C5 grade 1 anterolisthesis. Cervical lordosis maintained. No acute fracture   identified. Miscellaneous: Atherosclerosis. No other significant incidental findings   identified outside of the cervical spine region. Levels:   -Degenerative changes summarized above.   -No evidence of critical spinal canal stenosis. -Foraminal stenosis most notable at C3-C4, C4-C5, C5-C6, C6-C7.           08/25/22 2310  CT HEAD WO CONT Final result    Impression:      Very subtle finding suggestive of small left subdural hematoma overlying the   frontal convexities.   -No associated mass effect. Prior right subdural hematoma is resolved. No CT finding for acute territorial infarct . Report called to the covering ED physician on 8/25/22 at 2340 hours. Narrative:  CT Head without contrast       HISTORY: Fall. Weakness. COMPARISON: 9/20/2019       TECHNIQUE:  Axial imaging from the skull base to the vertex was performed   without intravenous contrast.  Coronal and sagittal reformations.   All CT scans   are performed using dose optimization techniques as appropriate to the performed   exam including the following: Automated exposure control, adjustment of mA   and/or kV according to patient size, and use of iterative reconstructive   technique. FINDINGS:        Very subtle extra-axial crescentic hyperdensity left frontal lobe image 14 and   15. This may represent a very small subdural hematoma. Prior right subdural hematoma is resolved. .       No shift of the midline structures. Cisterns are patent. Preservation of the   gray-white matter differentiations, no findings suggestive of acute territorial   infarct. There is a mild degree of white matter hypodensities       The size of the ventricles and sulci are stable. Visualized paranasal sinuses are clear. No evidence for skull fracture. CXR Results  (Last 48 hours)                 08/25/22 2221  XR CHEST PORT Final result    Impression:      Hypoinflation of the lungs without definite radiographic finding for an acute   cardiopulmonary process. Narrative:  Portable Chest       CPT CODE: 67901       HISTORY: Fall. FINDINGS:        Compared with 9/19/2019. Low lung volumes. Heart size and mediastinal contours within normal limits. No   pulmonary edema, effusion, or pneumothorax. No acute consolidation. Arthritic   change of the shoulders. The laboratory results, imaging results, and other diagnostic exams were reviewed in the EMR. Medical Decision Making   I am the first provider for this patient. I reviewed the vital signs, available nursing notes, past medical history, past surgical history, family history and social history. Vital Signs-Reviewed the patient's vital signs. ED EKG interpretation:  Rhythm: normal sinus rhythm; and regular . Rate (approx.): 66; Axis: normal; P wave: normal; QRS interval: normal ; ST/T wave: normal; Other findings: normal. This EKG was interpreted by Laith Ibarra DO.        Records Reviewed: Personally, on initial evaluation    MDM:   Jazmyn Young presents with complaint of fall from standing, confusion, generalized weakness  DDX includes but is not limited to: Intracranial bleed, intracranial mass, stroke, metabolic derangement, [id number], viral URI, pneumonia, ACS, arrhythmia, myocarditis, pericarditis, pleural effusion, pericardial effusion     Will obtain lab work and imaging  for further evaluation of patients complaint. Will continue to monitor and evaluate patient while in the ED. Orders as below:  Orders Placed This Encounter    COVID-19 WITH INFLUENZA A/B    XR CHEST PORT    CT HEAD WO CONT    CT SPINE CERV WO CONT    CBC WITH AUTOMATED DIFF    COMPREHENSIVE METABOLIC PANEL    TROPONIN-HIGH SENSITIVITY    MAGNESIUM    TROPONIN-HIGH SENSITIVITY    URINALYSIS W/ RFLX MICROSCOPIC    VITAL SIGNS    STRAIGHT CATHETER (NURSING) ONE TIME STAT    EKG, 12 LEAD, INITIAL    SALINE LOCK IV ONE TIME STAT    lactated ringers bolus infusion 500 mL        ED Course:   ED Course as of 08/26/22 0312   Thu Aug 25, 2022   250  80-year-old female history of CAD/MI, hypertension presenting today for weakness and fall from standing with caregivers. EMS notes that she did hit her head but was without loss of consciousness or seizure. Caregiver at bedside reports that she has had some cough the past few days. Is concerned about COVID as well. On exam she is alert, oriented to person and place, disoriented to time/year/events. Cranial nerves intact and without deficit. Peripheral neurologic exam normal.  Cardiopulmonary exam normal to auscultation. , 2+ radial pulses and Dp's. Abdomen soft nontender. She has no cervical spine tenderness, no observed signs of trauma externally. Given her exam, history, and her risk factors high concern for intracranial process including bleed, metabolic derangement, infection, ACS, arrhythmia. Will order lab work and imaging to evaluate.  [ZS]   2251 Reviewed, CBC is without leukocytosis, mild anemia, CMP with normal electrolytes mildly elevated BUN. Patient receiving appropriate hydration at this time. [ZS]   3122 Troponin is elevated 233, no baseline high sensitive troponin to evaluate against.  Patient is without chest pain or shortness of breath. She has a nonischemic EKG. [ZS]   8095 CT head reviewed, small subdural hematoma in the frontal convexities. She continues to have a normal neurologic examination at this time confusion. [ZS]   Fri Aug 26, 2022   0230 Repeat troponin 282, patient is continued without chest pains, feel this is likely baseline for her given significant cardiac history. I did review the chart and found that she had numerous troponin I readings that were chronically elevated although these cannot be directly correlated with the high-sensitivity troponins. I did review that she has a POSITIVE COVID test.  Discussed this and the results of the work-up with the patient and her caregiver at bedside. Urinalysis is without significant signs of urinary tract infection. [ZS]   [facility] transfer service, Dr. Florencio Arnold attending trauma surgeon, discussed case in the entirety. She will accept the patient in transfer as a [facility] trauma for [facility]. [ZS]      ED Course User Index  [ZS] Lazarus Boone,            Procedures:  Procedures        Diagnosis and Disposition     CLINICAL IMPRESSION:  No diagnosis found. Current Discharge Medication List          Disposition: Transfer to [facility] [place], Dr. Florencio Arnold accepting    Patient condition at time of disposition: Stable  Dragon Disclaimer     Please note that this dictation was completed with [employer] Medium, the computer voice recognition software. Quite often unanticipated grammatical, syntax, homophones, and other interpretive errors are inadvertently transcribed by the computer software. Please disregard these errors. Please excuse any errors that have escaped final proofreading.       Stan Pino, [facility]  [id number]

## 2022-10-06 ENCOUNTER — APPOINTMENT (OUTPATIENT)
Dept: GENERAL RADIOLOGY | Age: 87
End: 2022-10-06
Attending: PHYSICIAN ASSISTANT
Payer: MEDICARE

## 2022-10-06 ENCOUNTER — APPOINTMENT (OUTPATIENT)
Dept: CT IMAGING | Age: 87
End: 2022-10-06
Attending: PHYSICIAN ASSISTANT
Payer: MEDICARE

## 2022-10-06 ENCOUNTER — HOSPITAL ENCOUNTER (EMERGENCY)
Age: 87
Discharge: HOME | End: 2022-10-06
Attending: EMERGENCY MEDICINE
Payer: MEDICARE

## 2022-10-06 VITALS
HEART RATE: 98 BPM | DIASTOLIC BLOOD PRESSURE: 91 MMHG | BODY MASS INDEX: 19.04 KG/M2 | RESPIRATION RATE: 16 BRPM | OXYGEN SATURATION: 98 % | WEIGHT: 97 LBS | TEMPERATURE: 97.5 F | SYSTOLIC BLOOD PRESSURE: 147 MMHG | HEIGHT: 60 IN

## 2022-10-06 DIAGNOSIS — R03.0 ELEVATED BLOOD PRESSURE READING: ICD-10-CM

## 2022-10-06 DIAGNOSIS — M25.511 PAIN IN JOINT OF RIGHT SHOULDER: Primary | ICD-10-CM

## 2022-10-06 DIAGNOSIS — H70.90 MASTOIDITIS, UNSPECIFIED LATERALITY: ICD-10-CM

## 2022-10-06 LAB
ALBUMIN SERPL-MCNC: 3.3 G/DL (ref 3.4–5)
ALBUMIN/GLOB SERPL: 1 {RATIO} (ref 0.8–1.7)
ALP SERPL-CCNC: 55 U/L (ref 45–117)
ALT SERPL-CCNC: 24 U/L (ref 13–56)
ANION GAP SERPL CALC-SCNC: 6 MMOL/L (ref 3–18)
APPEARANCE UR: CLEAR
AST SERPL-CCNC: 28 U/L (ref 10–38)
BASOPHILS # BLD: 0.1 K/UL (ref 0–0.1)
BASOPHILS NFR BLD: 1 % (ref 0–2)
BILIRUB SERPL-MCNC: 0.3 MG/DL (ref 0.2–1)
BILIRUB UR QL: NEGATIVE
BUN SERPL-MCNC: 31 MG/DL (ref 7–18)
BUN/CREAT SERPL: 31 (ref 12–20)
CALCIUM SERPL-MCNC: 8.9 MG/DL (ref 8.5–10.1)
CHLORIDE SERPL-SCNC: 110 MMOL/L (ref 100–111)
CO2 SERPL-SCNC: 26 MMOL/L (ref 21–32)
COLOR UR: YELLOW
CREAT SERPL-MCNC: 1.01 MG/DL (ref 0.6–1.3)
DIFFERENTIAL METHOD BLD: ABNORMAL
EOSINOPHIL # BLD: 0.2 K/UL (ref 0–0.4)
EOSINOPHIL NFR BLD: 2 % (ref 0–5)
ERYTHROCYTE [DISTWIDTH] IN BLOOD BY AUTOMATED COUNT: 15.9 % (ref 11.6–14.5)
GLOBULIN SER CALC-MCNC: 3.2 G/DL (ref 2–4)
GLUCOSE SERPL-MCNC: 107 MG/DL (ref 74–99)
GLUCOSE UR STRIP.AUTO-MCNC: NEGATIVE MG/DL
HCT VFR BLD AUTO: 29.7 % (ref 35–45)
HGB BLD-MCNC: 9.4 G/DL (ref 12–16)
HGB UR QL STRIP: NEGATIVE
IMM GRANULOCYTES # BLD AUTO: 0 K/UL (ref 0–0.04)
IMM GRANULOCYTES NFR BLD AUTO: 0 % (ref 0–0.5)
KETONES UR QL STRIP.AUTO: NEGATIVE MG/DL
LEUKOCYTE ESTERASE UR QL STRIP.AUTO: NEGATIVE
LYMPHOCYTES # BLD: 2 K/UL (ref 0.9–3.6)
LYMPHOCYTES NFR BLD: 19 % (ref 21–52)
MAGNESIUM SERPL-MCNC: 2.3 MG/DL (ref 1.6–2.6)
MCH RBC QN AUTO: 30 PG (ref 24–34)
MCHC RBC AUTO-ENTMCNC: 31.6 G/DL (ref 31–37)
MCV RBC AUTO: 94.9 FL (ref 78–100)
MONOCYTES # BLD: 0.8 K/UL (ref 0.05–1.2)
MONOCYTES NFR BLD: 8 % (ref 3–10)
NEUTS SEG # BLD: 7.2 K/UL (ref 1.8–8)
NEUTS SEG NFR BLD: 70 % (ref 40–73)
NITRITE UR QL STRIP.AUTO: NEGATIVE
NRBC # BLD: 0 K/UL (ref 0–0.01)
NRBC BLD-RTO: 0 PER 100 WBC
PH UR STRIP: 5.5 [PH] (ref 5–8)
PLATELET # BLD AUTO: 154 K/UL (ref 135–420)
PMV BLD AUTO: 10.8 FL (ref 9.2–11.8)
POTASSIUM SERPL-SCNC: 4.3 MMOL/L (ref 3.5–5.5)
PROT SERPL-MCNC: 6.5 G/DL (ref 6.4–8.2)
PROT UR STRIP-MCNC: NEGATIVE MG/DL
RBC # BLD AUTO: 3.13 M/UL (ref 4.2–5.3)
SODIUM SERPL-SCNC: 142 MMOL/L (ref 136–145)
SP GR UR REFRACTOMETRY: 1.01 (ref 1–1.03)
TROPONIN-HIGH SENSITIVITY: 25 NG/L (ref 0–54)
UROBILINOGEN UR QL STRIP.AUTO: 0.2 EU/DL (ref 0.2–1)
WBC # BLD AUTO: 10.2 K/UL (ref 4.6–13.2)

## 2022-10-06 PROCEDURE — 93005 ELECTROCARDIOGRAM TRACING: CPT

## 2022-10-06 PROCEDURE — 99285 EMERGENCY DEPT VISIT HI MDM: CPT

## 2022-10-06 PROCEDURE — 70450 CT HEAD/BRAIN W/O DYE: CPT

## 2022-10-06 PROCEDURE — 84484 ASSAY OF TROPONIN QUANT: CPT

## 2022-10-06 PROCEDURE — 80053 COMPREHEN METABOLIC PANEL: CPT

## 2022-10-06 PROCEDURE — 74011250637 HC RX REV CODE- 250/637: Performed by: EMERGENCY MEDICINE

## 2022-10-06 PROCEDURE — 85025 COMPLETE CBC W/AUTO DIFF WBC: CPT

## 2022-10-06 PROCEDURE — 83735 ASSAY OF MAGNESIUM: CPT

## 2022-10-06 PROCEDURE — 81003 URINALYSIS AUTO W/O SCOPE: CPT

## 2022-10-06 PROCEDURE — 71046 X-RAY EXAM CHEST 2 VIEWS: CPT

## 2022-10-06 RX ORDER — ACETAMINOPHEN AND CODEINE PHOSPHATE 300; 30 MG/1; MG/1
0.5 TABLET ORAL
Qty: 5 TABLET | Refills: 0 | Status: SHIPPED | OUTPATIENT
Start: 2022-10-06 | End: 2022-10-09

## 2022-10-06 RX ORDER — DOXYCYCLINE HYCLATE 100 MG
100 TABLET ORAL 2 TIMES DAILY
Qty: 14 TABLET | Refills: 0 | Status: SHIPPED | OUTPATIENT
Start: 2022-10-06 | End: 2022-10-13

## 2022-10-06 RX ORDER — ACETAMINOPHEN AND CODEINE PHOSPHATE 300; 30 MG/1; MG/1
0.5 TABLET ORAL
Status: COMPLETED | OUTPATIENT
Start: 2022-10-06 | End: 2022-10-06

## 2022-10-06 RX ADMIN — ACETAMINOPHEN AND CODEINE PHOSPHATE 0.5 TABLET: 300; 30 TABLET ORAL at 20:20

## 2022-10-06 NOTE — ED PROVIDER NOTES
EMERGENCY DEPARTMENT HISTORY AND PHYSICAL EXAM    7:04 PM      Date: 10/6/2022  Patient Name: Brock Patino    History of Presenting Illness     Chief Complaint   Patient presents with    Altered mental status    Shoulder Pain         History Provided By: Patient  Location/Duration/Severity/Modifying factors   Patient is a 80-year-old female with a history of headache, migraine headache, subdural hematoma, depression, anemia, coronary disease with bypass graft, the presents emergency department with a complaint of right shoulder pain is been increasing for the last 2 days and has been insisting on her caregiver to bring her to the emergency department. Patient said that she has right shoulder pain and just was not feeling well and the caregiver notes that when she starts getting more angry like this that is usually a sign of infection. The patient went to an urgent care last week and there was nothing noted on the work-up but wanted her to be reevaluated because she was complaining of the shoulder pain and not feeling well. The patient is compliant with her medications and has 24/7 caregivers and the caregiver with her is with her most of the week. There are no other known aggravating or alleviating factors. Patient is a smoker, drinker, no drug user. PCP: Ari Handy MD    Current Facility-Administered Medications   Medication Dose Route Frequency Provider Last Rate Last Admin    acetaminophen-codeine (TYLENOL #3) per tablet 0.5 Tablet  0.5 Tablet Oral NOW Anitra Rojo MD         Current Outpatient Medications   Medication Sig Dispense Refill    acetaminophen-codeine (Tylenol-Codeine #3) 300-30 mg per tablet Take 0.5 Tablets by mouth every four (4) hours as needed for Pain for up to 3 days. Max Daily Amount: 3 Tablets. 5 Tablet 0    doxycycline (VIBRA-TABS) 100 mg tablet Take 1 Tablet by mouth two (2) times a day for 7 days.  14 Tablet 0    clopidogreL (PLAVIX) 75 mg tab TAKE ONE TABLET BY MOUTH DAILY 90 Tab 0    atorvastatin (LIPITOR) 40 mg tablet TAKE ONE TABLET BY MOUTH DAILY 90 Tab 0    escitalopram oxalate (LEXAPRO) 5 mg tablet TAKE ONE-HALF TABLET TO ONE TABLET BY MOUTH DAILY 90 Tab 0    metoprolol succinate (TOPROL-XL) 50 mg XL tablet Take 1 po daily 30 Tab 2    cholecalciferol (VITAMIN D3) 1,000 unit tablet Take  by mouth daily. cyanocobalamin (VITAMIN B-12) 1,000 mcg tablet Take 1,000 mcg by mouth daily. omeprazole (PRILOSEC) 40 mg capsule Take 40 mg by mouth daily. losartan (COZAAR) 50 mg tablet TAKE ONE TABLET BY MOUTH TWICE A  Tab 3    ferrous sulfate (IRON) 325 mg (65 mg iron) tablet Take 1 Tab by mouth Daily (before breakfast). 30 Tab 2       Past History     Past Medical History:  Past Medical History:   Diagnosis Date    Cardiac ambulatory blood pressure monitoring 10/25/2011    JNC-7 classification:  Stage 1 hypertension. Cardiac cath 02/22/2010    mRCA 35%. LM patent. Cx 25%. pLAD 100% (2.75 x 23 mm Cypher stent) LVEDP  20.  EF 45%. Anteroapical mod HK    Cardiac echocardiogram 05/11/2010    EF 60%. Gr 2 DDfx. LAE. Mild MR.  Mild-mod PI; PASP 30 mmHg. Carotid duplex 08/26/2013    Mild 1-49% bilateral ICA stenosis. Biphasic signals in both subclavian arteries. Coronary artery disease     acute anterior ST-elevation myocardial infarction/primary stenting of the LAD on 02/22/10/EF 45%. Coronary atherosclerosis of unspecified type of bypass graft(414.05)     Depressive disorder     Fatigue     Headache(784.0)     Heart disease, unspecified     Iron deficiency anemia, unspecified     Migraine     Migraine headache     Myocardial infarction ([facility] [address] 02/22/2010    Acute anterior wall w/primary intubation, stenting of prox LAD w/ 2.75-mm Cypher stent. 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     SDH (subdural hematoma) ([facility] 09/19/2019    Senile cataract, unspecified     Valvular heart disease     Mitral valve prolapse. Past Surgical History:  Past Surgical History:   Procedure Laterality Date    HX APPENDECTOMY      HX BREAST LUMPECTOMY      Left. HX CATARACT REMOVAL  5/1/2013, 5/8/2013    left and right    HX CORONARY STENT PLACEMENT  2/22/10    prox LAD w/ 2.75-mm Cypher stent. HX HEART CATHETERIZATION      HX TONSILLECTOMY         Family History:  Family History   Problem Relation Age of Onset    Heart Disease Mother     Heart Disease Father        Social History:  Social History     Tobacco Use    Smoking status: Never    Smokeless tobacco: Never   Substance Use Topics    Alcohol use: No    Drug use: No       Allergies: Allergies   Allergen Reactions    Viibryd [Vilazodone] Anaphylaxis and Vertigo    Coreg [Carvedilol] Other (comments)     Pt states coreg makes her feel like a \"zombie\"    Erythromycin Nausea Only     Severe. Penicillins Hives         Review of Systems       Review of Systems   Constitutional:  Negative for activity change, fatigue and fever. HENT:  Negative for congestion and rhinorrhea. Eyes:  Negative for visual disturbance. Respiratory:  Negative for shortness of breath. Cardiovascular:  Negative for chest pain and palpitations. Gastrointestinal:  Negative for abdominal pain, diarrhea, nausea and vomiting. Genitourinary:  Negative for dysuria and hematuria. Musculoskeletal:  Positive for arthralgias. Negative for back pain. Skin:  Negative for rash. Neurological:  Negative for dizziness, weakness and light-headedness. Psychiatric/Behavioral:  Positive for agitation. All other systems reviewed and are negative. Physical Exam   Visit Vitals  BP (!) 147/91   Pulse 98   Temp 97.5 °F (36.4 °C)   Resp 16   Ht 5' (1.524 m)   Wt 44 kg (97 lb)   SpO2 98%   BMI 18.94 kg/m²         Physical Exam  Vitals and nursing note reviewed.    Constitutional:       General: She is not in acute distress. Appearance: She is well-developed. HENT:      Head: Normocephalic and atraumatic. Right Ear: External ear normal.      Left Ear: External ear normal.      Nose: Nose normal.   Eyes:      General: No scleral icterus. Conjunctiva/sclera: Conjunctivae normal.      Pupils: Pupils are equal, round, and reactive to light. Neck:      Thyroid: No thyromegaly. Vascular: No JVD. Trachea: No tracheal deviation. Cardiovascular:      Rate and Rhythm: Normal rate and regular rhythm. Heart sounds: Normal heart sounds. No murmur heard. No friction rub. No gallop. Pulmonary:      Effort: Pulmonary effort is normal.      Breath sounds: Normal breath sounds. Chest:      Chest wall: No tenderness. Abdominal:      General: Bowel sounds are normal. There is no distension. Palpations: Abdomen is soft. Tenderness: There is no abdominal tenderness. There is no guarding or rebound. Musculoskeletal:         General: No tenderness. Cervical back: Normal range of motion and neck supple. Comments: Right shoulder with mild point tenderness, full passive and active range of motion   Lymphadenopathy:      Cervical: No cervical adenopathy. Skin:     General: Skin is warm and dry. Neurological:      Mental Status: She is alert. Cranial Nerves: No cranial nerve deficit. Coordination: Coordination normal.      Comments: Oriented x2, follows commands, gait not observed   Psychiatric:         Behavior: Behavior normal.         Thought Content: Thought content normal.         Judgment: Judgment normal.      Comments:  Follows commands, supportive caregiver at the bedside         Diagnostic Study Results     Labs -  Recent Results (from the past 12 hour(s))   CBC WITH AUTOMATED DIFF    Collection Time: 10/06/22  6:10 PM   Result Value Ref Range    WBC 10.2 4.6 - 13.2 K/uL    RBC 3.13 (L) 4.20 - 5.30 M/uL    HGB 9.4 (L) 12.0 - 16.0 g/dL    HCT 29.7 (L) 35.0 - 45.0 %    MCV 94.9 78.0 - 100.0 FL    MCH 30.0 24.0 - 34.0 PG    MCHC 31.6 31.0 - 37.0 g/dL    RDW 15.9 (H) 11.6 - 14.5 %    PLATELET 055 242 - 568 K/uL    MPV 10.8 9.2 - 11.8 FL    NRBC 0.0 0  WBC    ABSOLUTE NRBC 0.00 0.00 - 0.01 K/uL    NEUTROPHILS 70 40 - 73 %    LYMPHOCYTES 19 (L) 21 - 52 %    MONOCYTES 8 3 - 10 %    EOSINOPHILS 2 0 - 5 %    BASOPHILS 1 0 - 2 %    IMMATURE GRANULOCYTES 0 0.0 - 0.5 %    ABS. NEUTROPHILS 7.2 1.8 - 8.0 K/UL    ABS. LYMPHOCYTES 2.0 0.9 - 3.6 K/UL    ABS. MONOCYTES 0.8 0.05 - 1.2 K/UL    ABS. EOSINOPHILS 0.2 0.0 - 0.4 K/UL    ABS. BASOPHILS 0.1 0.0 - 0.1 K/UL    ABS. IMM. GRANS. 0.0 0.00 - 0.04 K/UL    DF AUTOMATED     METABOLIC PANEL, COMPREHENSIVE    Collection Time: 10/06/22  6:10 PM   Result Value Ref Range    Sodium 142 136 - 145 mmol/L    Potassium 4.3 3.5 - 5.5 mmol/L    Chloride 110 100 - 111 mmol/L    CO2 26 21 - 32 mmol/L    Anion gap 6 3.0 - 18 mmol/L    Glucose 107 (H) 74 - 99 mg/dL    BUN 31 (H) 7.0 - 18 MG/DL    Creatinine 1.01 0.6 - 1.3 MG/DL    BUN/Creatinine ratio 31 (H) 12 - 20      eGFR 50 (L) >60 ml/min/1.73m2    Calcium 8.9 8.5 - 10.1 MG/DL    Bilirubin, total 0.3 0.2 - 1.0 MG/DL    ALT (SGPT) 24 13 - 56 U/L    AST (SGOT) 28 10 - 38 U/L    Alk.  phosphatase 55 45 - 117 U/L    Protein, total 6.5 6.4 - 8.2 g/dL    Albumin 3.3 (L) 3.4 - 5.0 g/dL    Globulin 3.2 2.0 - 4.0 g/dL    A-G Ratio 1.0 0.8 - 1.7     MAGNESIUM    Collection Time: 10/06/22  6:10 PM   Result Value Ref Range    Magnesium 2.3 1.6 - 2.6 mg/dL   TROPONIN-HIGH SENSITIVITY    Collection Time: 10/06/22  6:10 PM   Result Value Ref Range    Troponin-High Sensitivity 25 0 - 54 ng/L   URINALYSIS W/ RFLX MICROSCOPIC    Collection Time: 10/06/22  6:40 PM   Result Value Ref Range    Color YELLOW      Appearance CLEAR      Specific gravity 1.014 1.005 - 1.030      pH (UA) 5.5 5.0 - 8.0      Protein Negative NEG mg/dL    Glucose Negative NEG mg/dL    Ketone Negative NEG mg/dL    Bilirubin Negative NEG Blood Negative NEG      Urobilinogen 0.2 0.2 - 1.0 EU/dL    Nitrites Negative NEG      Leukocyte Esterase Negative NEG         Radiologic Studies -   CT HEAD WO CONT   Final Result      1. New small left mastoid sinus effusion. Otherwise no change to prior study. 2. Atrophy and chronic microvascular disease with chronic right frontal lobe and   right thalamus infarcts. 3. Stable thin crescent of right lateral dural thickening at site of otherwise   resolved fluid collection seen in 2019. No acute hemorrhage. XR CHEST PA LAT    (Results Pending)     Chest x-ray, no acute process interpreted by me    Medical Decision Making   I am the first provider for this patient. I reviewed the vital signs, available nursing notes, past medical history, past surgical history, family history and social history. Vital Signs-Reviewed the patient's vital signs. EKG: Sinus rhythm at 90, left axis deviation, no STEMI, interpreted by me    Records Reviewed: Nursing Notes, Old Medical Records, Previous Radiology Studies, and Previous Laboratory Studies (Time of Review: 7:04 PM)    ED Course: Progress Notes, Reevaluation, and Consults: The patient has a reassuring urinalysis, chest x-ray, cardiac labs, and the shoulder that is seen on the chest x-ray looks to be intact and the patient has full range of motion. We will repeat her vital signs and if her blood pressure improves we will discuss a plan for close outpatient care as well as pain control. Joycelyn Herrera,  7:23 PM    Patient's blood pressure is improved and will proceed with close outpatient care. The patient's caregiver would like something stronger for pain so ordered a half dose of Tylenol with codeine and will discharge patient with the same and have her follow closely as an outpatient. The patient will return if at all worsen or concern. We will also start doxycycline for the mastoid fluid that is noted on the CT scan.     Workup and recommendations were reviewed with the patient and all questions were answered. The patient understands the plan and will proceed with close outpatient care. I have encouraged the patient to return if at all worsened or concerned. Bartolo Howard DO 8:05 PM        Provider Notes (Medical Decision Making):   MDM  Number of Diagnoses or Management Options  Elevated blood pressure reading  Mastoiditis, unspecified laterality  Pain in joint of right shoulder  Diagnosis management comments: The patient is a 80-year-old female with a history of cardiac disease, subdural hematoma, hearing loss, and advanced age who presents emergency department complaint of right shoulder pain and intermittent agitation. Patient's blood pressure in triage was elevated in the emergency department not having significant plaints taken orally range of motion her right shoulder. Patient CT of her head showed an mastoid effusion which likely be followed as an outpatient and will follow patient's cardiac labs, urinalysis, chest x-ray, then reevaluate. Patient has caregivers at home. Procedures          Diagnosis     Clinical Impression:   1. Pain in joint of right shoulder    2. Elevated blood pressure reading    3. Mastoiditis, unspecified laterality        Disposition: DC    Follow-up Information       Follow up With Specialties Details Why Contact Info    Your Primary  In 1 day      [facility] EMERGENCY DEPT Emergency Medicine  As needed, If symptoms worsen [address] [facility] [id number]  [phone]             Patient's Medications   Start Taking    ACETAMINOPHEN-CODEINE (TYLENOL-CODEINE #3) 300-30 MG PER TABLET    Take 0.5 Tablets by mouth every four (4) hours as needed for Pain for up to 3 days. Max Daily Amount: 3 Tablets. DOXYCYCLINE (VIBRA-TABS) 100 MG TABLET    Take 1 Tablet by mouth two (2) times a day for 7 days.    Continue Taking    ATORVASTATIN (LIPITOR) 40 MG TABLET    TAKE ONE TABLET BY MOUTH DAILY CHOLECALCIFEROL (VITAMIN D3) 1,000 UNIT TABLET    Take  by mouth daily. CLOPIDOGREL (PLAVIX) 75 MG TAB    TAKE ONE TABLET BY MOUTH DAILY    CYANOCOBALAMIN (VITAMIN B-12) 1,000 MCG TABLET    Take 1,000 mcg by mouth daily. ESCITALOPRAM OXALATE (LEXAPRO) 5 MG TABLET    TAKE ONE-HALF TABLET TO ONE TABLET BY MOUTH DAILY    FERROUS SULFATE (IRON) 325 MG (65 MG IRON) TABLET    Take 1 Tab by mouth Daily (before breakfast). LOSARTAN (COZAAR) 50 MG TABLET    TAKE ONE TABLET BY MOUTH TWICE A DAY    METOPROLOL SUCCINATE (TOPROL-XL) 50 MG XL TABLET    Take 1 po daily    OMEPRAZOLE (PRILOSEC) 40 MG CAPSULE    Take 40 mg by mouth daily. These Medications have changed    No medications on file   Stop Taking    No medications on file     Disclaimer: Sections of this note are dictated using utilizing voice recognition software. Minor typographical errors may be present. If questions arise, please do not hesitate to contact me or call our department.

## 2022-10-06 NOTE — ED TRIAGE NOTES
Patient accompanied by caregiver who states that patient has new onset confusion since this morning. Also reports right shoulder pain.     Gypsy Morris, BSN, RN

## 2022-10-07 LAB
ATRIAL RATE: 90 BPM
CALCULATED P AXIS, ECG09: 71 DEGREES
CALCULATED R AXIS, ECG10: -9 DEGREES
CALCULATED T AXIS, ECG11: 21 DEGREES
DIAGNOSIS, 93000: NORMAL
P-R INTERVAL, ECG05: 108 MS
Q-T INTERVAL, ECG07: 362 MS
QRS DURATION, ECG06: 78 MS
QTC CALCULATION (BEZET), ECG08: 442 MS
VENTRICULAR RATE, ECG03: 90 BPM

## 2022-10-07 NOTE — DISCHARGE INSTRUCTIONS
Take half tablet of the Tylenol 3 and see if it helps your pain if you need another half you can take that as well but suggest to start slow to avoid any drowsiness. Please follow-up close with your primary doctor and return if you are at all worsened or concerned.

## 2022-12-02 NOTE — TELEPHONE ENCOUNTER
----- Message from Joe Butler NP sent at 12/8/2017  3:29 PM EST -----  Please notify patient that lab results were reviewed and the results are ok. Ferritin level is much better.
Contacted Esperanza Almanza and informed Her of lab results for labs drawn on 12/04/2017 per Carrie Hope NP's request. Nneka Flores expressed understanding.
Chip Huff

## 2023-03-04 NOTE — PROGRESS NOTES
Patient came into the office today for a blood pressure check. Blood pressure was checked in the left arm at 128/58. Dr Wilkes Ao informed and patient able to leave office ambulatory. Yes

## 2024-01-01 ENCOUNTER — HOME CARE VISIT (OUTPATIENT)
Age: 89
End: 2024-01-01
Payer: MEDICARE

## 2024-01-01 ENCOUNTER — HOME CARE VISIT (OUTPATIENT)
Age: 89
End: 2024-01-01

## 2024-01-01 VITALS
SYSTOLIC BLOOD PRESSURE: 92 MMHG | DIASTOLIC BLOOD PRESSURE: 63 MMHG | HEART RATE: 75 BPM | TEMPERATURE: 98.2 F | RESPIRATION RATE: 30 BRPM | OXYGEN SATURATION: 71 %

## 2024-01-01 VITALS
HEART RATE: 105 BPM | SYSTOLIC BLOOD PRESSURE: 158 MMHG | DIASTOLIC BLOOD PRESSURE: 81 MMHG | OXYGEN SATURATION: 100 % | TEMPERATURE: 97.6 F | RESPIRATION RATE: 16 BRPM

## 2024-01-01 VITALS
OXYGEN SATURATION: 100 % | HEART RATE: 89 BPM | DIASTOLIC BLOOD PRESSURE: 86 MMHG | SYSTOLIC BLOOD PRESSURE: 159 MMHG | TEMPERATURE: 97.6 F | RESPIRATION RATE: 20 BRPM

## 2024-01-01 VITALS — TEMPERATURE: 97.3 F | HEART RATE: 130 BPM | RESPIRATION RATE: 30 BRPM

## 2024-01-01 VITALS — TEMPERATURE: 97.5 F | HEART RATE: 111 BPM | RESPIRATION RATE: 27 BRPM | OXYGEN SATURATION: 66 %

## 2024-01-01 PROCEDURE — G0155 HHCP-SVS OF CSW,EA 15 MIN: HCPCS

## 2024-01-01 PROCEDURE — G0300 HHS/HOSPICE OF LPN EA 15 MIN: HCPCS

## 2024-01-01 PROCEDURE — G0299 HHS/HOSPICE OF RN EA 15 MIN: HCPCS

## 2024-01-01 PROCEDURE — G0156 HHCP-SVS OF AIDE,EA 15 MIN: HCPCS

## 2024-01-01 ASSESSMENT — ENCOUNTER SYMPTOMS
BOWEL INCONTINENCE: 1
DYSPNEA ACTIVITY LEVEL: AT REST

## 2024-02-05 ENCOUNTER — APPOINTMENT (OUTPATIENT)
Facility: HOSPITAL | Age: 89
End: 2024-02-05
Payer: MEDICARE

## 2024-02-05 ENCOUNTER — HOSPITAL ENCOUNTER (INPATIENT)
Facility: HOSPITAL | Age: 89
LOS: 4 days | Discharge: HOSPICE | End: 2024-02-09
Attending: STUDENT IN AN ORGANIZED HEALTH CARE EDUCATION/TRAINING PROGRAM
Payer: MEDICARE

## 2024-02-05 DIAGNOSIS — I48.91 ATRIAL FIBRILLATION, UNSPECIFIED TYPE (HCC): ICD-10-CM

## 2024-02-05 DIAGNOSIS — I50.33 ACUTE ON CHRONIC DIASTOLIC CHF (CONGESTIVE HEART FAILURE) (HCC): ICD-10-CM

## 2024-02-05 DIAGNOSIS — Z51.5 HOSPICE CARE PATIENT: ICD-10-CM

## 2024-02-05 DIAGNOSIS — I48.91 ATRIAL FIBRILLATION WITH RVR (HCC): ICD-10-CM

## 2024-02-05 DIAGNOSIS — I10 ESSENTIAL HYPERTENSION, BENIGN: Primary | ICD-10-CM

## 2024-02-05 PROBLEM — R94.31 QT PROLONGATION: Status: ACTIVE | Noted: 2024-02-05

## 2024-02-05 PROBLEM — N17.9 AKI (ACUTE KIDNEY INJURY) (HCC): Status: ACTIVE | Noted: 2024-02-05

## 2024-02-05 PROBLEM — E78.5 HYPERLIPIDEMIA: Status: ACTIVE | Noted: 2024-02-05

## 2024-02-05 LAB
ALBUMIN SERPL-MCNC: 3.9 G/DL (ref 3.4–5)
ALBUMIN/GLOB SERPL: 1.1 (ref 0.8–1.7)
ALP SERPL-CCNC: 65 U/L (ref 45–117)
ALT SERPL-CCNC: 38 U/L (ref 13–56)
ANION GAP SERPL CALC-SCNC: 4 MMOL/L (ref 3–18)
AST SERPL-CCNC: 44 U/L (ref 10–38)
B PERT DNA SPEC QL NAA+PROBE: NOT DETECTED
BASOPHILS # BLD: 0.1 K/UL (ref 0–0.1)
BASOPHILS NFR BLD: 1 % (ref 0–2)
BILIRUB SERPL-MCNC: 0.7 MG/DL (ref 0.2–1)
BORDETELLA PARAPERTUSSIS BY PCR: NOT DETECTED
BUN SERPL-MCNC: 32 MG/DL (ref 7–18)
BUN/CREAT SERPL: 20 (ref 12–20)
C PNEUM DNA SPEC QL NAA+PROBE: NOT DETECTED
CALCIUM SERPL-MCNC: 8.9 MG/DL (ref 8.5–10.1)
CHLORIDE SERPL-SCNC: 112 MMOL/L (ref 100–111)
CO2 SERPL-SCNC: 24 MMOL/L (ref 21–32)
CREAT SERPL-MCNC: 1.59 MG/DL (ref 0.6–1.3)
D DIMER PPP FEU-MCNC: 2.34 UG/ML(FEU)
DIFFERENTIAL METHOD BLD: ABNORMAL
EKG ATRIAL RATE: 85 BPM
EKG DIAGNOSIS: NORMAL
EKG Q-T INTERVAL: 324 MS
EKG QRS DURATION: 68 MS
EKG QTC CALCULATION (BAZETT): 498 MS
EKG R AXIS: 40 DEGREES
EKG T AXIS: 214 DEGREES
EKG VENTRICULAR RATE: 142 BPM
EOSINOPHIL # BLD: 0.2 K/UL (ref 0–0.4)
EOSINOPHIL NFR BLD: 2 % (ref 0–5)
ERYTHROCYTE [DISTWIDTH] IN BLOOD BY AUTOMATED COUNT: 15.7 % (ref 11.6–14.5)
FLUAV SUBTYP SPEC NAA+PROBE: NOT DETECTED
FLUBV RNA SPEC QL NAA+PROBE: NOT DETECTED
GLOBULIN SER CALC-MCNC: 3.4 G/DL (ref 2–4)
GLUCOSE SERPL-MCNC: 124 MG/DL (ref 74–99)
HADV DNA SPEC QL NAA+PROBE: NOT DETECTED
HCOV 229E RNA SPEC QL NAA+PROBE: NOT DETECTED
HCOV HKU1 RNA SPEC QL NAA+PROBE: NOT DETECTED
HCOV NL63 RNA SPEC QL NAA+PROBE: NOT DETECTED
HCOV OC43 RNA SPEC QL NAA+PROBE: NOT DETECTED
HCT VFR BLD AUTO: 35.6 % (ref 35–45)
HGB BLD-MCNC: 11.2 G/DL (ref 12–16)
HMPV RNA SPEC QL NAA+PROBE: NOT DETECTED
HPIV1 RNA SPEC QL NAA+PROBE: NOT DETECTED
HPIV2 RNA SPEC QL NAA+PROBE: NOT DETECTED
HPIV3 RNA SPEC QL NAA+PROBE: NOT DETECTED
HPIV4 RNA SPEC QL NAA+PROBE: NOT DETECTED
IMM GRANULOCYTES # BLD AUTO: 0 K/UL (ref 0–0.04)
IMM GRANULOCYTES NFR BLD AUTO: 0 % (ref 0–0.5)
LYMPHOCYTES # BLD: 1.8 K/UL (ref 0.9–3.6)
LYMPHOCYTES NFR BLD: 20 % (ref 21–52)
M PNEUMO DNA SPEC QL NAA+PROBE: NOT DETECTED
MCH RBC QN AUTO: 31 PG (ref 24–34)
MCHC RBC AUTO-ENTMCNC: 31.5 G/DL (ref 31–37)
MCV RBC AUTO: 98.6 FL (ref 78–100)
MONOCYTES # BLD: 0.6 K/UL (ref 0.05–1.2)
MONOCYTES NFR BLD: 7 % (ref 3–10)
NEUTS SEG # BLD: 6.3 K/UL (ref 1.8–8)
NEUTS SEG NFR BLD: 70 % (ref 40–73)
NRBC # BLD: 0 K/UL (ref 0–0.01)
NRBC BLD-RTO: 0 PER 100 WBC
NT PRO BNP: ABNORMAL PG/ML (ref 0–1800)
PLATELET # BLD AUTO: 142 K/UL (ref 135–420)
PMV BLD AUTO: 11.3 FL (ref 9.2–11.8)
POTASSIUM SERPL-SCNC: 4.7 MMOL/L (ref 3.5–5.5)
PROT SERPL-MCNC: 7.3 G/DL (ref 6.4–8.2)
RBC # BLD AUTO: 3.61 M/UL (ref 4.2–5.3)
RSV RNA SPEC QL NAA+PROBE: NOT DETECTED
RV+EV RNA SPEC QL NAA+PROBE: NOT DETECTED
SARS-COV-2 RNA RESP QL NAA+PROBE: NOT DETECTED
SODIUM SERPL-SCNC: 140 MMOL/L (ref 136–145)
TROPONIN I SERPL HS-MCNC: 34 NG/L (ref 0–54)
TSH SERPL DL<=0.05 MIU/L-ACNC: 2.33 UIU/ML (ref 0.36–3.74)
WBC # BLD AUTO: 9 K/UL (ref 4.6–13.2)

## 2024-02-05 PROCEDURE — 6370000000 HC RX 637 (ALT 250 FOR IP)

## 2024-02-05 PROCEDURE — 83880 ASSAY OF NATRIURETIC PEPTIDE: CPT

## 2024-02-05 PROCEDURE — 93005 ELECTROCARDIOGRAM TRACING: CPT | Performed by: STUDENT IN AN ORGANIZED HEALTH CARE EDUCATION/TRAINING PROGRAM

## 2024-02-05 PROCEDURE — 6360000002 HC RX W HCPCS

## 2024-02-05 PROCEDURE — A9539 TC99M PENTETATE: HCPCS | Performed by: STUDENT IN AN ORGANIZED HEALTH CARE EDUCATION/TRAINING PROGRAM

## 2024-02-05 PROCEDURE — 93010 ELECTROCARDIOGRAM REPORT: CPT | Performed by: INTERNAL MEDICINE

## 2024-02-05 PROCEDURE — 96375 TX/PRO/DX INJ NEW DRUG ADDON: CPT

## 2024-02-05 PROCEDURE — 80053 COMPREHEN METABOLIC PANEL: CPT

## 2024-02-05 PROCEDURE — 99285 EMERGENCY DEPT VISIT HI MDM: CPT

## 2024-02-05 PROCEDURE — 85379 FIBRIN DEGRADATION QUANT: CPT

## 2024-02-05 PROCEDURE — 3430000000 HC RX DIAGNOSTIC RADIOPHARMACEUTICAL: Performed by: STUDENT IN AN ORGANIZED HEALTH CARE EDUCATION/TRAINING PROGRAM

## 2024-02-05 PROCEDURE — 96376 TX/PRO/DX INJ SAME DRUG ADON: CPT

## 2024-02-05 PROCEDURE — 84484 ASSAY OF TROPONIN QUANT: CPT

## 2024-02-05 PROCEDURE — 2580000003 HC RX 258: Performed by: STUDENT IN AN ORGANIZED HEALTH CARE EDUCATION/TRAINING PROGRAM

## 2024-02-05 PROCEDURE — 2500000003 HC RX 250 WO HCPCS

## 2024-02-05 PROCEDURE — 71045 X-RAY EXAM CHEST 1 VIEW: CPT

## 2024-02-05 PROCEDURE — 2500000003 HC RX 250 WO HCPCS: Performed by: INTERNAL MEDICINE

## 2024-02-05 PROCEDURE — 96374 THER/PROPH/DIAG INJ IV PUSH: CPT

## 2024-02-05 PROCEDURE — 99223 1ST HOSP IP/OBS HIGH 75: CPT

## 2024-02-05 PROCEDURE — 2580000003 HC RX 258: Performed by: INTERNAL MEDICINE

## 2024-02-05 PROCEDURE — 84443 ASSAY THYROID STIM HORMONE: CPT

## 2024-02-05 PROCEDURE — 2500000003 HC RX 250 WO HCPCS: Performed by: STUDENT IN AN ORGANIZED HEALTH CARE EDUCATION/TRAINING PROGRAM

## 2024-02-05 PROCEDURE — 85025 COMPLETE CBC W/AUTO DIFF WBC: CPT

## 2024-02-05 PROCEDURE — 1100000000 HC RM PRIVATE

## 2024-02-05 PROCEDURE — 0202U NFCT DS 22 TRGT SARS-COV-2: CPT

## 2024-02-05 PROCEDURE — 6360000002 HC RX W HCPCS: Performed by: STUDENT IN AN ORGANIZED HEALTH CARE EDUCATION/TRAINING PROGRAM

## 2024-02-05 RX ORDER — METOPROLOL TARTRATE 1 MG/ML
5 INJECTION, SOLUTION INTRAVENOUS ONCE
Status: COMPLETED | OUTPATIENT
Start: 2024-02-05 | End: 2024-02-05

## 2024-02-05 RX ORDER — ATORVASTATIN CALCIUM 40 MG/1
40 TABLET, FILM COATED ORAL NIGHTLY
Status: DISCONTINUED | OUTPATIENT
Start: 2024-02-05 | End: 2024-02-09 | Stop reason: HOSPADM

## 2024-02-05 RX ORDER — SODIUM CHLORIDE 0.9 % (FLUSH) 0.9 %
5-40 SYRINGE (ML) INJECTION EVERY 12 HOURS SCHEDULED
Status: DISCONTINUED | OUTPATIENT
Start: 2024-02-05 | End: 2024-02-09 | Stop reason: HOSPADM

## 2024-02-05 RX ORDER — 0.9 % SODIUM CHLORIDE 0.9 %
1000 INTRAVENOUS SOLUTION INTRAVENOUS ONCE
Status: COMPLETED | OUTPATIENT
Start: 2024-02-05 | End: 2024-02-05

## 2024-02-05 RX ORDER — ONDANSETRON 2 MG/ML
4 INJECTION INTRAMUSCULAR; INTRAVENOUS EVERY 6 HOURS PRN
Status: DISCONTINUED | OUTPATIENT
Start: 2024-02-05 | End: 2024-02-05

## 2024-02-05 RX ORDER — FAMOTIDINE 20 MG/1
20 TABLET, FILM COATED ORAL DAILY
Status: DISCONTINUED | OUTPATIENT
Start: 2024-02-05 | End: 2024-02-09 | Stop reason: HOSPADM

## 2024-02-05 RX ORDER — HEPARIN SODIUM 5000 [USP'U]/ML
5000 INJECTION, SOLUTION INTRAVENOUS; SUBCUTANEOUS 2 TIMES DAILY
Status: DISCONTINUED | OUTPATIENT
Start: 2024-02-05 | End: 2024-02-09 | Stop reason: HOSPADM

## 2024-02-05 RX ORDER — POLYETHYLENE GLYCOL 3350 17 G/17G
17 POWDER, FOR SOLUTION ORAL DAILY PRN
Status: DISCONTINUED | OUTPATIENT
Start: 2024-02-05 | End: 2024-02-09 | Stop reason: HOSPADM

## 2024-02-05 RX ORDER — METOPROLOL SUCCINATE 25 MG/1
12.5 TABLET, EXTENDED RELEASE ORAL DAILY
Status: DISCONTINUED | OUTPATIENT
Start: 2024-02-05 | End: 2024-02-06

## 2024-02-05 RX ORDER — ONDANSETRON 4 MG/1
4 TABLET, ORALLY DISINTEGRATING ORAL EVERY 8 HOURS PRN
Status: DISCONTINUED | OUTPATIENT
Start: 2024-02-05 | End: 2024-02-05

## 2024-02-05 RX ORDER — DILTIAZEM HYDROCHLORIDE 5 MG/ML
10 INJECTION INTRAVENOUS
Status: COMPLETED | OUTPATIENT
Start: 2024-02-05 | End: 2024-02-05

## 2024-02-05 RX ORDER — BUDESONIDE 1 MG/2ML
1 INHALANT ORAL 2 TIMES DAILY
Status: DISPENSED | OUTPATIENT
Start: 2024-02-05 | End: 2024-02-07

## 2024-02-05 RX ORDER — IPRATROPIUM BROMIDE AND ALBUTEROL SULFATE 2.5; .5 MG/3ML; MG/3ML
1 SOLUTION RESPIRATORY (INHALATION)
Status: DISPENSED | OUTPATIENT
Start: 2024-02-05 | End: 2024-02-07

## 2024-02-05 RX ORDER — METOPROLOL SUCCINATE 25 MG/1
12.5 TABLET, EXTENDED RELEASE ORAL DAILY
Status: ON HOLD | COMMUNITY
Start: 2024-01-26 | End: 2024-02-09 | Stop reason: HOSPADM

## 2024-02-05 RX ORDER — ACETAMINOPHEN 325 MG/1
650 TABLET ORAL EVERY 6 HOURS PRN
Status: DISCONTINUED | OUTPATIENT
Start: 2024-02-05 | End: 2024-02-09 | Stop reason: HOSPADM

## 2024-02-05 RX ORDER — ACETAMINOPHEN 650 MG/1
650 SUPPOSITORY RECTAL EVERY 6 HOURS PRN
Status: DISCONTINUED | OUTPATIENT
Start: 2024-02-05 | End: 2024-02-09 | Stop reason: HOSPADM

## 2024-02-05 RX ORDER — ASPIRIN 81 MG/1
81 TABLET ORAL DAILY
Status: DISCONTINUED | OUTPATIENT
Start: 2024-02-05 | End: 2024-02-09 | Stop reason: HOSPADM

## 2024-02-05 RX ORDER — ARFORMOTEROL TARTRATE 15 UG/2ML
15 SOLUTION RESPIRATORY (INHALATION)
Status: DISPENSED | OUTPATIENT
Start: 2024-02-05 | End: 2024-02-07

## 2024-02-05 RX ORDER — DILTIAZEM HYDROCHLORIDE 5 MG/ML
15 INJECTION INTRAVENOUS ONCE
Status: COMPLETED | OUTPATIENT
Start: 2024-02-05 | End: 2024-02-05

## 2024-02-05 RX ORDER — FUROSEMIDE 10 MG/ML
20 INJECTION INTRAMUSCULAR; INTRAVENOUS 2 TIMES DAILY
Status: DISCONTINUED | OUTPATIENT
Start: 2024-02-06 | End: 2024-02-07

## 2024-02-05 RX ORDER — SODIUM CHLORIDE 0.9 % (FLUSH) 0.9 %
5-40 SYRINGE (ML) INJECTION PRN
Status: DISCONTINUED | OUTPATIENT
Start: 2024-02-05 | End: 2024-02-09 | Stop reason: HOSPADM

## 2024-02-05 RX ORDER — KIT FOR THE PREPARATION OF TECHNETIUM TC 99M PENTETATE 20 MG/1
29 INJECTION, POWDER, LYOPHILIZED, FOR SOLUTION INTRAVENOUS; RESPIRATORY (INHALATION)
Status: COMPLETED | OUTPATIENT
Start: 2024-02-05 | End: 2024-02-05

## 2024-02-05 RX ORDER — FUROSEMIDE 10 MG/ML
60 INJECTION INTRAMUSCULAR; INTRAVENOUS
Status: COMPLETED | OUTPATIENT
Start: 2024-02-05 | End: 2024-02-05

## 2024-02-05 RX ORDER — PROCHLORPERAZINE EDISYLATE 5 MG/ML
10 INJECTION INTRAMUSCULAR; INTRAVENOUS EVERY 6 HOURS PRN
Status: DISCONTINUED | OUTPATIENT
Start: 2024-02-05 | End: 2024-02-09 | Stop reason: HOSPADM

## 2024-02-05 RX ADMIN — METOPROLOL TARTRATE 5 MG: 5 INJECTION INTRAVENOUS at 19:42

## 2024-02-05 RX ADMIN — SODIUM CHLORIDE 1000 ML: 9 INJECTION, SOLUTION INTRAVENOUS at 10:08

## 2024-02-05 RX ADMIN — BUDESONIDE 1 MG: 1 SUSPENSION RESPIRATORY (INHALATION) at 20:58

## 2024-02-05 RX ADMIN — IPRATROPIUM BROMIDE AND ALBUTEROL SULFATE 1 DOSE: .5; 3 SOLUTION RESPIRATORY (INHALATION) at 18:18

## 2024-02-05 RX ADMIN — DILTIAZEM HYDROCHLORIDE 15 MG: 5 INJECTION, SOLUTION INTRAVENOUS at 11:16

## 2024-02-05 RX ADMIN — ARFORMOTEROL TARTRATE 15 MCG: 15 SOLUTION RESPIRATORY (INHALATION) at 20:58

## 2024-02-05 RX ADMIN — ATORVASTATIN CALCIUM 40 MG: 40 TABLET, FILM COATED ORAL at 20:59

## 2024-02-05 RX ADMIN — DILTIAZEM HYDROCHLORIDE 10 MG: 5 INJECTION, SOLUTION INTRAVENOUS at 13:57

## 2024-02-05 RX ADMIN — Medication 29 MILLICURIE: at 15:20

## 2024-02-05 RX ADMIN — ASPIRIN 81 MG: 81 TABLET, COATED ORAL at 18:15

## 2024-02-05 RX ADMIN — SODIUM CHLORIDE 5 MG/HR: 900 INJECTION, SOLUTION INTRAVENOUS at 22:51

## 2024-02-05 RX ADMIN — FUROSEMIDE 60 MG: 10 INJECTION, SOLUTION INTRAMUSCULAR; INTRAVENOUS at 11:17

## 2024-02-05 RX ADMIN — METOPROLOL SUCCINATE 12.5 MG: 25 TABLET, EXTENDED RELEASE ORAL at 18:15

## 2024-02-05 RX ADMIN — FAMOTIDINE 20 MG: 20 TABLET ORAL at 18:15

## 2024-02-05 RX ADMIN — Medication 4.5 MILLICURIE: at 15:21

## 2024-02-05 RX ADMIN — HEPARIN SODIUM 5000 UNITS: 5000 INJECTION INTRAVENOUS; SUBCUTANEOUS at 21:03

## 2024-02-05 NOTE — H&P
History and Physical          Subjective     HPI: Kamilah Villegas is a 89 y.o. female with a PMHx of coronary artery disease, depression, migraine, hyperlipidemia who presented to the ED with caregiver. Patient is alert and somewhat oriented. Is very [place]. History obtained from caregiver at bedside. Per caregiver, she noticed patient began to start wheezing and having SOB which began this morning. Denies associated chest pain, palpitations. Does not wear oxygen at home. States patient did not receive home meds this AM as she was brought to the ED. Upon my evaluation, patient resting in no apparent distress. HR continues to be between 120-140 during my interview. Per caregiver she does not have any history of underlying CHF. Denies fever, chills, nvd, abdominal pain, headache, cough, dizziness, BLE edema. Denies smoking, drinking, illicit drug use.     IN the ED, Temp 98.7, respiration 16-34, heart rate 144 122, blood pressure 131/100, 98% on 2 L nasal cannula.  Creatinine 1.59.  proBNP 10,689.  Troponin negative.  Hemoglobin 11.2, hematocrit 35.6.  D-dimer 2.34.  Respiratory viral panel negative.  Chest x-ray with mild failure with tiny effusions.  VQ scan without evidence of PE.  EKG with atrial fibrillation with RVR, prolonged QTc.  Received total of 35 mg IV diltiazem, 60 mg IV Lasix, 1 L normal saline bolus in the ED.      Home meds reconciled with the patient  Code status discussed- DNR     PMHx:  Past Medical History:   Diagnosis Date    Carotid bruit 08/26/2013    Mild 1-49% bilateral ICA stenosis.  Biphasic signals in both subclavian arteries.    Coronary artery disease     acute anterior ST-elevation myocardial infarction/primary stenting of the LAD on 02/22/10/EF 45%.    Coronary atherosclerosis of unspecified type of bypass graft(414.05)     Depressive disorder     Fatigue     H/O ambulatory blood pressure monitoring 10/25/2011    JNC-7 classification:  Stage 1 hypertension.    Headache(784.0)      Problems             Last Modified POA    * (Principal) Atrial fibrillation with RVR ([facility]) 2/5/2024 Yes    Essential hypertension, benign 2/5/2024 Yes    CAD (coronary artery disease) 2/5/2024 Yes    Subdural hematoma ([facility]) 2/5/2024 Yes    Gastroesophageal reflux disease without esophagitis 2/5/2024 Yes    Acute on chronic diastolic CHF (congestive heart failure) ([facility]) 2/5/2024 Yes    DENICE (acute kidney injury) ([facility]) 2/5/2024 Yes    Hyperlipidemia 2/5/2024 Yes    QT prolongation 2/5/2024 Yes     Assessment:  Atrial fibrillation with RVR- currently rate between 120-140, Chadsvasc  score 4, recent hx of SDH   Acute Hypoxic Respiratory Failure 2/2 Acute CHF - CXR with pulm edema, elevated proBNP, SOB   Acute Kidney Injury- Cr 1.59   QT prolongation- 498   Hx of Subdural Hematoma in 2019   Hypertension  GERD  Hyperlipidemia  Coronary artery disease  Underweight, cachectic       Plan:  - Will start ASA 81 mg daily   - Con't PO metoprolol daily and give one time dose of IV lopressor for better control   - IV Lasix 20 mg BID (monitor renal functions closely)  - Scheduled Bronchodilators for SOB   - Obtain Echo  - Supportive oxygen as needed   - Cardiac Monitoring  - Low Na diet; Fluid restriction  - Strict I&O  - Daily Weight  - hold off on IVF d/t pulm edema  - Avoid nephrotoxins (losartan)   - Avoid QT prolonging agents   - Resume appropriate home meds- Metoprolol, Lipitor   - Consult cardiology in the AM     PT/OT/IS     Anticipated Discharge: 2 days     DVT Prophylaxis:  []Lovenox  [x]Hep SQ  []SCDs  []Coumadin []DOAC  []On Heparin gtt     I have personally reviewed all pertinent labs, films and EKGs that have officially resulted. I reviewed available electronic documentation outlining the initial presentation as well as the emergency room physician's encounter.    Time spent reviewing records, independently interpreting results, obtaining history from patient or caregiver, performing physical exam, ordering tests and

## 2024-02-05 NOTE — ED PROVIDER NOTES
I received the patient in turnover from Dr. Adamson at the end of her shift.  The patient is a 89-year-old woman who presented to the ED today with atrial fibrillation with RVR, CHF, and DENICE.  On exam and x-ray, she did have pulmonary edema.  She was given IV Lasix.  She is currently on 4 L nasal cannula.  Her heart rate was in the 130s and she received 2 doses of IV diltiazem.  At the time of turnover, we are awaiting her VQ scan.    Recent Results (from the past 24 hour(s))   EKG 12 Lead    Collection Time: 02/05/24  9:55 AM   Result Value Ref Range    Ventricular Rate 142 BPM    Atrial Rate 85 BPM    QRS Duration 68 ms    Q-T Interval 324 ms    QTc Calculation (Bazett) 498 ms    R Axis 40 degrees    T Axis 214 degrees    Diagnosis       Atrial fibrillation with rapid ventricular response  Cannot rule out Anterior infarct , age undetermined  Abnormal ECG    Confirmed by MD Dandy, Marv ([id number]) on 2/5/2024 1:00:23 PM     Respiratory Panel, Molecular, with COVID-19 (Restricted: peds pts or suitable admitted adults)    Collection Time: 02/05/24 10:04 AM    Specimen: Nasopharyngeal   Result Value Ref Range    Adenovirus by PCR Not detected NOTD      Coronavirus 229E by PCR Not detected NOTD      Coronavirus HKU1 by PCR Not detected NOTD      Coronavirus NL63 by PCR Not detected NOTD      Coronavirus OC43 by PCR Not detected NOTD      SARS-CoV-2, PCR Not detected NOTD      Human Metapneumovirus by PCR Not detected NOTD      Rhinovirus Enterovirus PCR Not detected NOTD      Influenza A by PCR Not detected NOTD      Influenza B PCR Not detected NOTD      Parainfluenza 1 PCR Not detected NOTD      Parainfluenza 2 PCR Not detected NOTD      Parainfluenza 3 PCR Not detected NOTD      Parainfluenza 4 PCR Not detected NOTD      Respiratory Syncytial Virus by PCR Not detected NOTD      Bordetella parapertussis by PCR Not detected NOTD      Bordetella pertussis by PCR Not detected NOTD      Chlamydophila Pneumonia PCR

## 2024-02-05 NOTE — ED NOTES
1335H - relayed patient's heart rate to Dr. Adamson concerning not to send her to nuclear medicine. As per Dr. Adamson she is okay with that; she advised to give another Diltiazem IV prior going to nuclear medicine.   Workers compensation [facility]    EMPLOYER:  [employer]    JOB TITLE:  Temp  Years: 3 weeks    Prior Injuries: No    DATE OF INJURY: 5/29/2018     SUBJECTIVE:   Agnes is a 38 year old female who presents today with concerns of finger pain.    Chief Complaint   Patient presents with   • Workers Compensation Follow Up Visit     Antonella 5/29 right 3rd digit and wrist, worse since DOI, N&T in 3rd digit and to wrist       HISTORY OF PRESENT ILLNESS:   The patient reports the pain is located in the Right middle finger.   Radiates:  To wrist  The symptoms have been present since 5/29/2018 when she dropped a pallet on finger.  The pain is described as severe.    The symptoms are WORSE today despite not working.  The patient rates the pain at 8 today on a 10 scale when at its worst.    The patient rates the pain at 4 today on a 10 scale when at its best.  Symptoms are exacerbated by activity/movement.    She does not have symptoms of weakness, tingling, or numbness.     Denies any other aggravating or relieving factor and any other associated signs and symptoms.     RED FLAGS:                                                            Profound or progressive neurologic deficit?  no  Severe progressive symptomology?  no  Motor weakness?  no  Loss of upper extremity reflexes?  no  High energy trauma?  no  Unexplained weight loss (lost more than 10 pounds in less than 6 months)?  no  Suspected infection (discharge, fevers, chills, elevated WBC or ESR)?  no  Contraindications for nonsteroidal anti-inflammatory drugs (hypertension, gastrointestinal intolerance, kidney disease, pregnancy)?  no    PROBLEM LIST/PAST MEDICAL HISTORY:  Reviewed in enavu.    PERTINENT PAST MEDICAL HISTORY:  ALLERGIES:  No Known Allergies  Last Menstrual Period: No LMP recorded. Patient has had a hysterectomy.     Birth Control Pill: N/A    SOCIAL HISTORY:  Social History     Social History   • Marital status: Single     Spouse name: N/A   • Number of  children: N/A   • Years of education: N/A     Occupational History   • Not on file.     Social History Main Topics   • Smoking status: Current Some Day Smoker     Types: Cigarettes   • Smokeless tobacco: Never Used   • Alcohol use Not on file   • Drug use: Unknown   • Sexual activity: Not on file     Other Topics Concern   • Not on file     Social History Narrative   • No narrative on file     HOBBIES: None pertinent.    REVIEW OF SYSTEMS:   No other pertinent cardiovascular or respiratory symptoms were elicited.      OBJECTIVE:    Visit Vitals  /80   Resp 14   Ht 5' 3\" (1.6 m)   Wt 80.3 kg   BMI 31.35 kg/m²     Constitutional: Well-developed and well-nourished.  Nontoxic and in no distress.   Head: Normocephalic and atraumatic.   Eyes: Conjunctivae are normal. Pupils are equal, round, and reactive to light.   Neck: Supple, symmetrical, trachea midline, no adenopathy; Thyroid: No enlargement or nodules; no carotid bruit or JVD (jugular venous distention). normal range of motion.  No tenderness to palpation in musculature, no spinous tenderness.  Cardiovascular: Normal rate, regular rhythm and normal heart sounds.   Pulmonary/Chest: Effort normal and breath sounds normal.  No tenderness or deformity.  NEUROLOGIC: Sensory and motor grossly intact. Reflexes normal and symmetric    Extremities: no clubbing, cyanosis, edema or deformity noted except as discussed in Special testing  Psychiatric: Oriented to person, place, and time.   Special Testing:  right hand:  Warm, well-perfused.  Strong radial pulse, normal capillary refill.  The sensation was intact throughout to light touch. There was not subjective differences in the ulnar and median nerve sensation.   There is injury localized to the Right long finger(s). Tender at MCPJ with decreased range of motion. Remaining fingers and wrist show normal range of motion and strength to wrist flexion and extension.  Distal median, ulnar and radial nerve function was  normal.    Anatomical snuffbox is not tender to palpation.  Finkelstein's testing was negative.  No tenderness to palpation at the distal or proximal carpal rows.  Axial loading is nontender.  TFCC (triangular fibrocartilage complex) and ECRL (Extensor Carpi Radialis Longus) testing does not reproduce the pain.  Resisted long finger testing is not sore.  CONTRALATERAL hand:  Warm, well-perfused.  Strong radial pulse, normal capillary refill.  The sensation was intact throughout to light touch. There was not subjective differences in the ulnar and median nerve sensation.    There is no obvious deformity about the upper extremity. Wrist and finger range of motion was normal.  Strength testing:  Normal wrist flexion and extension.  Distal median, ulnar and radial nerve function was normal.    Anatomical snuffbox is not tender to palpation.  Finkelstein's testing was negative.  No tenderness to palpation at the distal or proximal carpal rows.  Axial loading is nontender.  TFCC and ECL testing does not reproduce the pain.  Resisted long finger testing is not sore.  XR FINGER MIN 2 VW RIGHT      CLINICAL INDICATION:  Crushing injury of right middle finger, initial  encounter      COMPARISON:  None.     TECHNIQUE:  3 view(s) of the right third/middle finger  were submitted.     FINDINGS:  There is mild cortical irregularity involving the distal portion  of the tuft of the distal phalanx of the right third/middle finger,  suspicious for a tiny, mildly displaced displaced fracture at this site.   No large or grossly displaced fracture fragments are evident.  No  radiopaque foreign body.  There is soft tissue swelling adjacent to the  distal phalanx.  The joint spaces are maintained.        IMPRESSION: Questionable tiny, slightly displaced fracture involving the  tuft of the distal phalanx.     PROCEDURE:  A custom splint is fabricated for the right  Ulnar gutter using Ortho-Glass and Ace bandages.  Procedure was well  tolerated and distal neurovascular function was intact following procedure.        Agnes was seen today for workers compensation follow up visit.    Diagnoses and all orders for this visit:    Crushing injury of right middle finger, subsequent encounter      STATUS: This injury is determined to be WORK RELATED.  RETURN TO WORK:   Ms. Grover may return to work with restrictions on 6/5/2018             RESTRICTIONS:   Restrictions are to be followed at work and at home.   Restrictions are in effect until next follow-up visit.   Left handed work only  Diagnostic Testing:   XR FINGER MIN 2 VW RIGHT    ANTICIPATED MAXIMUM MEDICAL IMPROVEMENT:  1-2 weeks  TREATMENT PLAN:  Warm water soaks  New splint  Light stretches  Medications as directed  FOLLOW UP:  1 week    OT if NB

## 2024-02-05 NOTE — ED TRIAGE NOTES
FROM home, c/o sob since this am, o2 sat of 90% on Room Air.   Afib at 130, no hx. /90. Placed on 2LNC, maintained 90%.

## 2024-02-05 NOTE — ED NOTES
11:20 Purewick placed. Patient has refused to put on hospital gown. Offered patient warm blankets. Patent continued to refuse gown.

## 2024-02-05 NOTE — ED NOTES
1440H - Nuclear medicine staff came in to transport patient to radiology department.  1600H - patient came back from radiology department.

## 2024-02-05 NOTE — ED PROVIDER NOTES
[facility] 2S TELEMETRY  EMERGENCY DEPARTMENT ENCOUNTER     Pt Name: Kamilah Villegas  MRN: [id number]  Birthdate 12/22/1923  Date of evaluation: 2/5/2024  Provider: Xavier Adamson MD  PCP: Chriss Lopez MD  Note Started: 7:18 PM EST 2/6/24    Chief Complaint  Shortness of Breath      History of Present Illness  Kamilah Villegas is a 89 y.o. female with a history of CAD who presents to the ED with a chief complaint of shortness of breath.  Patient was found by her caregiver earlier this morning to be short of breath.  Per EMS patient was found to be in A-fib with RVR, patient has no previous history of A-fib, is not taking any medications for anticoagulation.  Patient reports shortness of breath, denies chest pain, syncope, nausea, vomiting, fever.    Medical History  Past Medical History:   Diagnosis Date    Carotid bruit 08/26/2013    Mild 1-49% bilateral ICA stenosis.  Biphasic signals in both subclavian arteries.    Coronary artery disease     acute anterior ST-elevation myocardial infarction/primary stenting of the LAD on 02/22/10/EF 45%.    Coronary atherosclerosis of unspecified type of bypass graft(414.05)     Depressive disorder     Fatigue     H/O ambulatory blood pressure monitoring 10/25/2011    JNC-7 classification:  Stage 1 hypertension.    Headache(784.0)     Heart disease, unspecified     History of echocardiogram 05/11/2010    EF 60%.  Gr 2 DDfx.  LAE.  Mild MR.  Mild-mod PI; PASP 30 mmHg.    Iron deficiency anemia, unspecified     Migraine     Migraine headache     Myocardial infarction (HCC) 02/22/2010    Acute anterior wall w/primary intubation, stenting of prox LAD w/ 2.75-mm Cypher stent.    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    S/P cardiac cath 02/22/2010    mRCA 35%. LM patent. Cx 25%. pLAD 100% (2.75 x 23 mm Cypher stent) LVEDP  20.  EF 45%.

## 2024-02-06 ENCOUNTER — APPOINTMENT (OUTPATIENT)
Facility: HOSPITAL | Age: 89
End: 2024-02-06
Payer: MEDICARE

## 2024-02-06 LAB
ANION GAP SERPL CALC-SCNC: 10 MMOL/L (ref 3–18)
BASOPHILS # BLD: 0.1 K/UL (ref 0–0.1)
BASOPHILS NFR BLD: 1 % (ref 0–2)
BUN SERPL-MCNC: 33 MG/DL (ref 7–18)
BUN/CREAT SERPL: 21 (ref 12–20)
CALCIUM SERPL-MCNC: 9 MG/DL (ref 8.5–10.1)
CHLORIDE SERPL-SCNC: 110 MMOL/L (ref 100–111)
CO2 SERPL-SCNC: 21 MMOL/L (ref 21–32)
CREAT SERPL-MCNC: 1.54 MG/DL (ref 0.6–1.3)
DIFFERENTIAL METHOD BLD: ABNORMAL
ECHO AO ASC DIAM: 2.9 CM
ECHO AO ASCENDING AORTA INDEX: 2 CM/M2
ECHO AO ROOT DIAM: 2.8 CM
ECHO AO ROOT INDEX: 1.93 CM/M2
ECHO AV AREA PEAK VELOCITY: 1.2 CM2
ECHO AV AREA VTI: 1.4 CM2
ECHO AV AREA/BSA PEAK VELOCITY: 0.8 CM2/M2
ECHO AV AREA/BSA VTI: 1 CM2/M2
ECHO AV MEAN GRADIENT: 6 MMHG
ECHO AV MEAN VELOCITY: 1.2 M/S
ECHO AV PEAK GRADIENT: 8 MMHG
ECHO AV PEAK VELOCITY: 1.4 M/S
ECHO AV VELOCITY RATIO: 0.43
ECHO AV VTI: 25.5 CM
ECHO BSA: 1.45 M2
ECHO LA DIAMETER INDEX: 3.03 CM/M2
ECHO LA DIAMETER: 4.4 CM
ECHO LA TO AORTIC ROOT RATIO: 1.57
ECHO LA VOL A-L A2C: 66 ML (ref 22–52)
ECHO LA VOL A-L A4C: 64 ML (ref 22–52)
ECHO LA VOL BP: 66 ML (ref 22–52)
ECHO LA VOL MOD A2C: 61 ML (ref 22–52)
ECHO LA VOL MOD A4C: 60 ML (ref 22–52)
ECHO LA VOL/BSA BIPLANE: 46 ML/M2 (ref 16–34)
ECHO LA VOLUME AREA LENGTH: 72 ML
ECHO LA VOLUME INDEX A-L A2C: 46 ML/M2 (ref 16–34)
ECHO LA VOLUME INDEX A-L A4C: 44 ML/M2 (ref 16–34)
ECHO LA VOLUME INDEX AREA LENGTH: 50 ML/M2 (ref 16–34)
ECHO LA VOLUME INDEX MOD A2C: 42 ML/M2 (ref 16–34)
ECHO LA VOLUME INDEX MOD A4C: 41 ML/M2 (ref 16–34)
ECHO LV E' LATERAL VELOCITY: 9 CM/S
ECHO LV E' SEPTAL VELOCITY: 6 CM/S
ECHO LV FRACTIONAL SHORTENING: 29 % (ref 28–44)
ECHO LV INTERNAL DIMENSION DIASTOLE INDEX: 2.62 CM/M2
ECHO LV INTERNAL DIMENSION DIASTOLIC: 3.8 CM (ref 3.9–5.3)
ECHO LV INTERNAL DIMENSION SYSTOLIC INDEX: 1.86 CM/M2
ECHO LV INTERNAL DIMENSION SYSTOLIC: 2.7 CM
ECHO LV IVSD: 0.9 CM (ref 0.6–0.9)
ECHO LV MASS 2D: 109 G (ref 67–162)
ECHO LV MASS INDEX 2D: 75.2 G/M2 (ref 43–95)
ECHO LV POSTERIOR WALL DIASTOLIC: 1 CM (ref 0.6–0.9)
ECHO LV RELATIVE WALL THICKNESS RATIO: 0.53
ECHO LVOT AREA: 2.8 CM2
ECHO LVOT AV VTI INDEX: 0.5
ECHO LVOT DIAM: 1.9 CM
ECHO LVOT MEAN GRADIENT: 1 MMHG
ECHO LVOT PEAK GRADIENT: 1 MMHG
ECHO LVOT PEAK VELOCITY: 0.6 M/S
ECHO LVOT STROKE VOLUME INDEX: 25 ML/M2
ECHO LVOT SV: 36.3 ML
ECHO LVOT VTI: 12.8 CM
ECHO MV A VELOCITY: 0.3 M/S
ECHO MV E DECELERATION TIME (DT): 108.2 MS
ECHO MV E VELOCITY: 1.06 M/S
ECHO MV E/A RATIO: 3.53
ECHO MV E/E' LATERAL: 11.78
ECHO MV E/E' RATIO (AVERAGED): 14.72
ECHO MV REGURGITANT PEAK GRADIENT: 121 MMHG
ECHO MV REGURGITANT PEAK VELOCITY: 5.5 M/S
ECHO MV REGURGITANT VTIA: 163.1 CM
ECHO PULMONARY ARTERY END DIASTOLIC PRESSURE: 7 MMHG
ECHO PV REGURGITANT MAX VELOCITY: 1.3 M/S
ECHO RA VOLUME: 35 ML
ECHO RA VOLUME: 37 ML
ECHO TV REGURGITANT MAX VELOCITY: 3.47 M/S
ECHO TV REGURGITANT PEAK GRADIENT: 48 MMHG
EOSINOPHIL # BLD: 0.2 K/UL (ref 0–0.4)
EOSINOPHIL NFR BLD: 2 % (ref 0–5)
ERYTHROCYTE [DISTWIDTH] IN BLOOD BY AUTOMATED COUNT: 15.9 % (ref 11.6–14.5)
GLUCOSE SERPL-MCNC: 102 MG/DL (ref 74–99)
HCT VFR BLD AUTO: 33.5 % (ref 35–45)
HGB BLD-MCNC: 10.6 G/DL (ref 12–16)
IMM GRANULOCYTES # BLD AUTO: 0 K/UL (ref 0–0.04)
IMM GRANULOCYTES NFR BLD AUTO: 0 % (ref 0–0.5)
LYMPHOCYTES # BLD: 1.9 K/UL (ref 0.9–3.6)
LYMPHOCYTES NFR BLD: 19 % (ref 21–52)
MCH RBC QN AUTO: 31.1 PG (ref 24–34)
MCHC RBC AUTO-ENTMCNC: 31.6 G/DL (ref 31–37)
MCV RBC AUTO: 98.2 FL (ref 78–100)
MONOCYTES # BLD: 0.7 K/UL (ref 0.05–1.2)
MONOCYTES NFR BLD: 7 % (ref 3–10)
NEUTS SEG # BLD: 7.1 K/UL (ref 1.8–8)
NEUTS SEG NFR BLD: 71 % (ref 40–73)
NRBC # BLD: 0 K/UL (ref 0–0.01)
NRBC BLD-RTO: 0 PER 100 WBC
PLATELET # BLD AUTO: 152 K/UL (ref 135–420)
PMV BLD AUTO: 11.7 FL (ref 9.2–11.8)
POTASSIUM SERPL-SCNC: 4.3 MMOL/L (ref 3.5–5.5)
RBC # BLD AUTO: 3.41 M/UL (ref 4.2–5.3)
SODIUM SERPL-SCNC: 141 MMOL/L (ref 136–145)
WBC # BLD AUTO: 10 K/UL (ref 4.6–13.2)

## 2024-02-06 PROCEDURE — 99232 SBSQ HOSP IP/OBS MODERATE 35: CPT | Performed by: HOSPITALIST

## 2024-02-06 PROCEDURE — 6370000000 HC RX 637 (ALT 250 FOR IP): Performed by: PHYSICIAN ASSISTANT

## 2024-02-06 PROCEDURE — 6370000000 HC RX 637 (ALT 250 FOR IP): Performed by: INTERNAL MEDICINE

## 2024-02-06 PROCEDURE — 6360000002 HC RX W HCPCS

## 2024-02-06 PROCEDURE — 2500000003 HC RX 250 WO HCPCS: Performed by: INTERNAL MEDICINE

## 2024-02-06 PROCEDURE — 97530 THERAPEUTIC ACTIVITIES: CPT

## 2024-02-06 PROCEDURE — 2580000003 HC RX 258: Performed by: INTERNAL MEDICINE

## 2024-02-06 PROCEDURE — 80048 BASIC METABOLIC PNL TOTAL CA: CPT

## 2024-02-06 PROCEDURE — 94761 N-INVAS EAR/PLS OXIMETRY MLT: CPT

## 2024-02-06 PROCEDURE — 99223 1ST HOSP IP/OBS HIGH 75: CPT | Performed by: NURSE PRACTITIONER

## 2024-02-06 PROCEDURE — 85025 COMPLETE CBC W/AUTO DIFF WBC: CPT

## 2024-02-06 PROCEDURE — 92526 ORAL FUNCTION THERAPY: CPT

## 2024-02-06 PROCEDURE — 97166 OT EVAL MOD COMPLEX 45 MIN: CPT

## 2024-02-06 PROCEDURE — 6370000000 HC RX 637 (ALT 250 FOR IP)

## 2024-02-06 PROCEDURE — 93306 TTE W/DOPPLER COMPLETE: CPT | Performed by: INTERNAL MEDICINE

## 2024-02-06 PROCEDURE — 2580000003 HC RX 258

## 2024-02-06 PROCEDURE — 97535 SELF CARE MNGMENT TRAINING: CPT

## 2024-02-06 PROCEDURE — 97162 PT EVAL MOD COMPLEX 30 MIN: CPT

## 2024-02-06 PROCEDURE — 99223 1ST HOSP IP/OBS HIGH 75: CPT | Performed by: INTERNAL MEDICINE

## 2024-02-06 PROCEDURE — 92610 EVALUATE SWALLOWING FUNCTION: CPT

## 2024-02-06 PROCEDURE — 6370000000 HC RX 637 (ALT 250 FOR IP): Performed by: HOSPITALIST

## 2024-02-06 PROCEDURE — 93306 TTE W/DOPPLER COMPLETE: CPT

## 2024-02-06 PROCEDURE — 94640 AIRWAY INHALATION TREATMENT: CPT

## 2024-02-06 PROCEDURE — 36415 COLL VENOUS BLD VENIPUNCTURE: CPT

## 2024-02-06 PROCEDURE — 1100000003 HC PRIVATE W/ TELEMETRY

## 2024-02-06 RX ORDER — LORAZEPAM 0.5 MG/1
0.25 TABLET ORAL EVERY 6 HOURS PRN
Status: DISCONTINUED | OUTPATIENT
Start: 2024-02-06 | End: 2024-02-09 | Stop reason: HOSPADM

## 2024-02-06 RX ORDER — ESCITALOPRAM OXALATE 5 MG/1
5 TABLET ORAL DAILY
Status: DISCONTINUED | OUTPATIENT
Start: 2024-02-06 | End: 2024-02-09 | Stop reason: HOSPADM

## 2024-02-06 RX ORDER — ACETAMINOPHEN 500 MG
1 TABLET ORAL
COMMUNITY
Start: 2024-02-09

## 2024-02-06 RX ADMIN — LORAZEPAM 0.25 MG: 0.5 TABLET ORAL at 15:44

## 2024-02-06 RX ADMIN — IPRATROPIUM BROMIDE AND ALBUTEROL SULFATE 1 DOSE: .5; 3 SOLUTION RESPIRATORY (INHALATION) at 11:56

## 2024-02-06 RX ADMIN — METOPROLOL SUCCINATE 12.5 MG: 25 TABLET, EXTENDED RELEASE ORAL at 09:07

## 2024-02-06 RX ADMIN — HEPARIN SODIUM 5000 UNITS: 5000 INJECTION INTRAVENOUS; SUBCUTANEOUS at 09:06

## 2024-02-06 RX ADMIN — ACETAMINOPHEN 325MG 650 MG: 325 TABLET ORAL at 22:07

## 2024-02-06 RX ADMIN — FUROSEMIDE 20 MG: 10 INJECTION, SOLUTION INTRAMUSCULAR; INTRAVENOUS at 17:45

## 2024-02-06 RX ADMIN — SODIUM CHLORIDE 5 MG/HR: 900 INJECTION, SOLUTION INTRAVENOUS at 19:47

## 2024-02-06 RX ADMIN — SODIUM CHLORIDE, PRESERVATIVE FREE 10 ML: 5 INJECTION INTRAVENOUS at 09:08

## 2024-02-06 RX ADMIN — METOPROLOL TARTRATE 25 MG: 25 TABLET, FILM COATED ORAL at 22:07

## 2024-02-06 RX ADMIN — IPRATROPIUM BROMIDE AND ALBUTEROL SULFATE 1 DOSE: .5; 3 SOLUTION RESPIRATORY (INHALATION) at 08:16

## 2024-02-06 RX ADMIN — BUDESONIDE 1 MG: 1 SUSPENSION RESPIRATORY (INHALATION) at 08:16

## 2024-02-06 RX ADMIN — ARFORMOTEROL TARTRATE 15 MCG: 15 SOLUTION RESPIRATORY (INHALATION) at 08:16

## 2024-02-06 RX ADMIN — FAMOTIDINE 20 MG: 20 TABLET ORAL at 09:06

## 2024-02-06 RX ADMIN — HEPARIN SODIUM 5000 UNITS: 5000 INJECTION INTRAVENOUS; SUBCUTANEOUS at 22:08

## 2024-02-06 RX ADMIN — ATORVASTATIN CALCIUM 40 MG: 40 TABLET, FILM COATED ORAL at 22:07

## 2024-02-06 RX ADMIN — ASPIRIN 81 MG: 81 TABLET, COATED ORAL at 09:08

## 2024-02-06 RX ADMIN — FUROSEMIDE 20 MG: 10 INJECTION, SOLUTION INTRAMUSCULAR; INTRAVENOUS at 09:06

## 2024-02-06 RX ADMIN — SODIUM CHLORIDE, PRESERVATIVE FREE 10 ML: 5 INJECTION INTRAVENOUS at 22:07

## 2024-02-06 RX ADMIN — IPRATROPIUM BROMIDE AND ALBUTEROL SULFATE 1 DOSE: .5; 3 SOLUTION RESPIRATORY (INHALATION) at 20:49

## 2024-02-06 RX ADMIN — METOPROLOL TARTRATE 12.5 MG: 25 TABLET, FILM COATED ORAL at 12:11

## 2024-02-06 RX ADMIN — ARFORMOTEROL TARTRATE 15 MCG: 15 SOLUTION RESPIRATORY (INHALATION) at 20:48

## 2024-02-06 RX ADMIN — BUDESONIDE 1 MG: 1 SUSPENSION RESPIRATORY (INHALATION) at 20:48

## 2024-02-06 RX ADMIN — ESCITALOPRAM OXALATE 5 MG: 5 TABLET, FILM COATED ORAL at 06:28

## 2024-02-06 ASSESSMENT — PAIN SCALES - PAIN ASSESSMENT IN ADVANCED DEMENTIA (PAINAD)
BODYLANGUAGE: 0
CONSOLABILITY: 0
TOTALSCORE: 1
CONSOLABILITY: 0
BREATHING: 0
FACIALEXPRESSION: 0
TOTALSCORE: 0
FACIALEXPRESSION: 0
TOTALSCORE: 0
NEGVOCALIZATION: 0
BREATHING: 0
NEGVOCALIZATION: 0
NEGVOCALIZATION: 0
BREATHING: 0
BODYLANGUAGE: 1
CONSOLABILITY: 0
FACIALEXPRESSION: 0
BODYLANGUAGE: 0

## 2024-02-06 ASSESSMENT — PAIN SCALES - GENERAL
PAINLEVEL_OUTOF10: 0

## 2024-02-06 ASSESSMENT — ENCOUNTER SYMPTOMS
NAUSEA: 0
COUGH: 0
SHORTNESS OF BREATH: 1
DIARRHEA: 0
VOMITING: 0

## 2024-02-06 NOTE — ACP (ADVANCE CARE PLANNING)
Advance Care Planning     General Advance Care Planning (ACP) Conversation      Palliative Medicine    Advance Care Planning Conversation      The patient and/or family consented to a voluntary Advance Care Planning conversation. Individuals present for the conversation: Patient, private caregiver      Outcome of the conversation and documents completed:  Visited patient for new consult along with Palliative team member NATO Alfaro NP. Patient lying in bed, awake and alert. Responded to name, recognizes caregiver at the bedside. Pleasantly confused, she is unable to participate in health care conversations or make complex medical decisions.  Caregiver shared that patient has 24 hour private caregivers from [employer] in [place]. She uses her rollator at home, able to assist with her care, able to feed self once meals are prepared. Enjoys reading the newspaper and watching CNN every day.     Pt does not have an Advance Directive on file in EMR. She is not able to complete one at this time. Patient is , never had any children and all siblings have passed away. Caregiver was able to provide our team with patient's trust agent, Marleny Mehta, and the owner of [employer], Mollydeandre Jaquez. All contact information added to contact list.    Call placed to trust agent Marleny Mehta at [phone]. Ms Mehta states she does handle patient's affairs. Introduced our team and our role in the hospitalized patient. Clarified with Ms Mehta that we need to determine medical decision makers and address goals of care (code status). She voiced understanding and stated she needs to contact two other individuals before making any final decisions. Provided her with our contact information. Ms Mehta stated she will call our department back as soon as possible.      ACP documents you currently have include:  [] Advance Directive or Living Will  [] Durable Do Not Resuscitate  [] Physician Orders for Selective Treatments (POLST)  []

## 2024-02-06 NOTE — PLAN OF CARE
Problem: Occupational Therapy - Adult  Goal: By Discharge: Performs self-care activities at highest level of function for planned discharge setting.  See evaluation for individualized goals.  Description: Occupational Therapy Goals:  Initiated 2/6/2024 to be met within 7-10 days.    1.  Patient will perform grooming with modified independence.   2.  Patient will perform lower body bathing with minimal assistance/contact guard assist.  3.  Patient will perform lower body dressing  with minimal assistance/contact guard assist.  4.  Patient will perform toilet transfers with supervision/set-up using RW with good balance.  5.  Patient will perform all aspects of toileting with supervision/set-up.  6.  Patient will participate in upper extremity therapeutic exercise/activities with supervision/set-up for 8-10 minutes to increase strength/endurance for ADLs.    7.  Patient will utilize energy conservation techniques during functional activities with min verbal cues.    PLOF: Caregiver/HH aide reports 24/7 supv with 9 HH aides rotating, assist with ADLs and Supv for toilet transfers using rollator and supv toilet tasks      Outcome: Progressing   OCCUPATIONAL THERAPY EVALUATION    Patient: Kamilah Villegas (89 y.o. female)  Date: 2/6/2024  Primary Diagnosis: Essential hypertension, benign [I10]  Atrial fibrillation with RVR (HCC) [I48.91]       Precautions: Fall Risk, General Precautions,  ,  ,  ,  ,  ,  ,      ASSESSMENT :  Pt laying semi-reclined in bed, A & O x 3-re-orientated to situation, HH aide from PLOF present and assisted with accurate PLOF. Bed mobility: cga supine -> sit edge of bed, sit-> supine w/ Mod A. Bedside commode transfer: ST with Min A, hand held assist for SPT from bed <-> bedside commode, pt unable to void/urinate, states she has the sensation/urge to urinate. Pt laying semi-reclined in bed at end of tx session, call bell within reach & pt verbalized understanding to utilize for assist e.g.     left and right    CORONARY ANGIOPLASTY WITH STENT PLACEMENT  2/22/10    prox LAD w/ 2.75-mm Cypher stent.    TONSILLECTOMY         Home Situation:   Social/Functional History  Lives With: Spouse  Type of Home: House  Home Layout: One level  Bathroom Shower/Tub: Walk-in shower  Bathroom Toilet: Standard (has bedside commode-has not used)  Bathroom Equipment: Shower chair  Bathroom Accessibility: Walker accessible  Home Equipment: Walker, rolling, Rollator  Has the patient had two or more falls in the past year or any fall with injury in the past year?: Unknown  Receives Help From: Personal care attendant (caregivers 24/7)  ADL Assistance: Needs assistance  Ambulation Assistance: Needs assistance  Transfer Assistance: Needs assistance  []  Right hand dominant   []  Left hand dominant    Cognitive/Behavioral Status:  Orientation  Overall Orientation Status: Impaired  Orientation Level: Oriented to person;Oriented to place  Cognition  Overall Cognitive Status: Exceptions  Arousal/Alertness: Delayed responses to stimuli  Following Commands: Follows one step commands with repetition;Follows one step commands with increased time  Attention Span: Attends with cues to redirect  Memory: Decreased recall of recent events  Safety Judgement: Decreased awareness of need for assistance  Problem Solving: Assistance required to generate solutions;Assistance required to implement solutions  Insights: Decreased awareness of deficits  Initiation: Requires cues for some  Sequencing: Requires cues for some    Skin: none noted  Edema: appears intact    Vision/Perceptual:    Vision  Vision: Impaired  Vision Exceptions: Wears glasses at all times        Coordination: BUE  Coordination: Within functional limits        Balance:     Balance  Sitting: Intact  Standing: With support    Strength: BUE  Strength: Generally decreased, functional    Tone & Sensation: BUE  Tone: Normal       Range of Motion: BUE  AROM: Within functional limits    Benzoyl Peroxide Pregnancy And Lactation Text: This medication is Pregnancy Category C. It is unknown if benzoyl peroxide is excreted in breast milk.

## 2024-02-06 NOTE — PALLIATIVE CARE
Full note to follow     Patient pleasantly confused and not able to participate in her goals of care     Spoke with her MPOA Candice Jacobs they have reviewed AMD patient's wishes reviewed. Goals of care discussed DNR/DNI     POST emailed to Candice DNR/DNI limited interventions no feeding tubes

## 2024-02-06 NOTE — PLAN OF CARE
Problem: SLP Adult - Impaired Swallowing  Goal: By Discharge: Advance to least restrictive diet without signs or symptoms of aspiration for planned discharge setting.  See evaluation for individualized goals.  Description: Patient will:  1. Tolerate PO trials with 0 s/s overt distress in 4/5 trials  2. Utilize compensatory swallow strategies/maneuvers (decrease bite/sip, size/rate, alt. liq/sol) with min cues in 4/5 trials      Rec:     Regular diet with thin liquids  Aspiration precautions  HOB >45 during po intake, remain >30 for 30-45 minutes after po   Small bites/sips; alternate liquid/solid with slow feeding rate   Oral care TID  Meds per pt preference      Outcome: Progressing  SPEECH LANGUAGE PATHOLOGY BEDSIDE SWALLOW EVALUATION/TREATMENT    Patient: Kamilah Villegas (89 y.o. female)  Date: 2/6/2024  Primary Diagnosis: Essential hypertension, benign [I10]  Atrial fibrillation with RVR (HCC) [I48.91]       Precautions: Aspiration  PLOF: As per H&P  ASSESSMENT:  Based on the objective data described below, the patient presents with oropharyngeal swallow fxn essentially WFL. Pt seen at bedside with caregiver present in room. Strength, ROM, and coordination of the orofacial musculature were all found to be WFL. Pt tolerated reg solid, puree, and thin liquids +/- straw consecutive swallows without any overt s/sx of aspiration. Mastication was appropriate with timely a-p transit. Positive oral clearance observed across all trials. Laryngeal elevation was functional/timely to palpation with all PO trials. Speech production was fluent; no dysarthria observed. Expressive/receptive speech production appeared WFL to informal observation. Pt communicated in sentences of appropriate form, content, and use. Social conversation appropriate. Pt safe for initiation of reg solid diet with thin liquids, aspiration precautions, oral care TID, and meds as tolerated. ST will continue to follow x 1-2 visits to ensure safety of

## 2024-02-06 NOTE — PROGRESS NOTES
[facility]  Progress Note    Patient: Kamilah Villegas Age: 89 y.o. : 1923 MR#: [id number] SSN: [id number]  Date/Time: 2024     Subjective: Patient sitting in the side of the bed, feels good.  Able to communicate but slightly confused.  Caregiver at the bedside.     Assessment/Plan:   Atrial fibrillation with RVR  Acute Hypoxia  Acute Kidney Injury versus CKD  Acute heart failure  QT prolongation- 498   Hx of Subdural Hematoma in 2019   Hypertension  GERD  Hyperlipidemia  Coronary artery disease  Underweight, cachectic         Plan:  Will continue IV diuresis with Lasix, monitor I's/O  Echo pending  Continue IV Cardizem drip, agree with beta-blocker per cardiology  Continue aspirin  Continue bronchodilators and O2 supplement as needed  PT/OT eval and treatment  Further plan based on hospital course  We will consult palliative care for goals of care    Discussed with patient and also with the caregiver at the bedside and explained about my above plan care.      Case discussed with:  [x]Patient  [x]Family  [x]Nursing  []Case Management  DVT Prophylaxis:  []Lovenox  [x]Hep SQ  []SCDs  []Coumadin   []Eliquis/Xarelto     Objective:   VS: /82   Pulse 86   Temp 97.3 °F (36.3 °C) (Oral)   Resp 18   Ht 1.524 m (5')   Wt 50.2 kg (110 lb 10.7 oz)   SpO2 96%   BMI 21.61 kg/m²    Tmax/24hrs: Temp (24hrs), Av.7 °F (36.5 °C), Min:97.3 °F (36.3 °C), Max:98.4 °F (36.9 °C)  IOBRIEF  Intake/Output Summary (Last 24 hours) at 2024 1414  Last data filed at 2024 1110  Gross per 24 hour   Intake 240 ml   Output 1050 ml   Net -810 ml       General:  Alert, cooperative, no acute distress    HEENT: PERRLA, anicteric sclerae.  Pulmonary:  CTA Bilaterally. No Wheezing/Rales.  Cardiovascular: IRRegular rate and Rhythm.  GI:  Soft, Non distended, Non tender. + Bowel sounds.  Extremities:  No edema. No calf tenderness.   Psych: Not anxious or agitated.  Neurologic:     Glucose 102 (H) 74 - 99 mg/dL    BUN 33 (H) 7.0 - 18 MG/DL    Creatinine 1.54 (H) 0.6 - 1.3 MG/DL    Bun/Cre Ratio 21 (H) 12 - 20      Est, Glom Filt Rate 30 (L) >60 ml/min/1.73m2    Calcium 9.0 8.5 - 10.1 MG/DL   CBC with Auto Differential    Collection Time: 02/06/24  3:59 AM   Result Value Ref Range    WBC 10.0 4.6 - 13.2 K/uL    RBC 3.41 (L) 4.20 - 5.30 M/uL    Hemoglobin 10.6 (L) 12.0 - 16.0 g/dL    Hematocrit 33.5 (L) 35.0 - 45.0 %    MCV 98.2 78.0 - 100.0 FL    MCH 31.1 24.0 - 34.0 PG    MCHC 31.6 31.0 - 37.0 g/dL    RDW 15.9 (H) 11.6 - 14.5 %    Platelets 152 135 - 420 K/uL    MPV 11.7 9.2 - 11.8 FL    Nucleated RBCs 0.0 0  WBC    nRBC 0.00 0.00 - 0.01 K/uL    Neutrophils % 71 40 - 73 %    Lymphocytes % 19 (L) 21 - 52 %    Monocytes % 7 3 - 10 %    Eosinophils % 2 0 - 5 %    Basophils % 1 0 - 2 %    Immature Granulocytes 0 0.0 - 0.5 %    Neutrophils Absolute 7.1 1.8 - 8.0 K/UL    Lymphocytes Absolute 1.9 0.9 - 3.6 K/UL    Monocytes Absolute 0.7 0.05 - 1.2 K/UL    Eosinophils Absolute 0.2 0.0 - 0.4 K/UL    Basophils Absolute 0.1 0.0 - 0.1 K/UL    Absolute Immature Granulocyte 0.0 0.00 - 0.04 K/UL    Differential Type AUTOMATED         Signed By: Hunter Pro MD     February 6, 2024      Disclaimer: Sections of this note are dictated using utilizing voice recognition software.  Minor typographical errors may be present. If questions arise, please do not hesitate to contact me or call our department.

## 2024-02-06 NOTE — CONSULTS
sent via secure email and docusigned by Ms Jacobs. We have received POLST, it was scanned in chart. Goals of care DNR/DNI limited interventions no feeding tubes.   Initial consult note routed to primary continuity provider  Communicated plan of care with: Palliative IDT           TREATMENT PREFERENCES:   Code Status: DNR    Advance Care Planning:  [] [facility] Interdisciplinary Team has updated the ACP Navigator with Health Care Agent and Patient Capacity    Primary Decision Maker (Health Care Agent):   Primary Decision Maker: Candice Jacobs - Friend - [phone]            Other:  As far as possible, the palliative care team has discussed with patient / health care proxy about goals of care / treatment preferences for patient.     HISTORY:     History obtained from: chart and MPOA     CHIEF COMPLAINT: A fib with RVR     HPI/SUBJECTIVE:    The patient is:   [] Verbal and participatory  [x] Non-participatory due to: confusion   Please see summary      Clinical Pain Assessment (nonverbal scale for nonverbal patients):            Duration: for how long has pt been experiencing pain (e.g., 2 days, 1 month, years)  Frequency: how often pain is an issue (e.g., several times per day, once every few days, constant)     FUNCTIONAL ASSESSMENT:     Palliative Performance Scale (PPS): 40           PSYCHOSOCIAL/SPIRITUAL SCREENING:      Any spiritual / Restorationist concerns: unable to assess for patient   [] Yes /  [] No    Caregiver Burnout:  [] Yes /  [x] No /  [] No Caregiver Present      Anticipatory grief assessment: unable to assess for patient   [] Normal  / [] Maladaptive        REVIEW OF SYSTEMS:     Positive and pertinent negative findings in ROS are noted above in HPI.  The following systems were [] reviewed / [x] unable to be reviewed as noted in HPI  Other findings are noted below.  Systems: constitutional, ears/nose/mouth/throat, respiratory, gastrointestinal, genitourinary, musculoskeletal, integumentary,  neurologic, psychiatric, endocrine. Positive findings noted below.  Modified ESAS Completed by: provider                                            PHYSICAL EXAM:     Wt Readings from Last 3 Encounters:   02/06/24 49.9 kg (110 lb)     Blood pressure (!) 147/87, pulse 91, temperature 97.2 °F (36.2 °C), resp. rate 18, height 1.524 m (5'), weight 49.9 kg (110 lb), SpO2 95 %.  Pain:                         Constitutional: elderly female who is reclining in bed pleasantly confused   Eyes: pupils equal  ENMT: moist MM   Cardiovascular: irregular rhythm  Respiratory: breathing not labored  Gastrointestinal: soft   Skin: warm, dry  Neurologic: alert oriented x 2 pleasantly confused        HISTORY:     Principal Problem:    Atrial fibrillation with RVR ([facility])  Active Problems:    Essential hypertension, benign    CAD (coronary artery disease)    Subdural hematoma ([facility])    Gastroesophageal reflux disease without esophagitis    Acute on chronic diastolic CHF (congestive heart failure) ([facility])    DENICE (acute kidney injury) ([facility])    Hyperlipidemia    QT prolongation  Resolved Problems:    * No resolved hospital problems. *    Past Medical History:   Diagnosis Date    Carotid bruit 08/26/2013    Mild 1-49% bilateral ICA stenosis.  Biphasic signals in both subclavian arteries.    Coronary artery disease     acute anterior ST-elevation myocardial infarction/primary stenting of the LAD on 02/22/10/EF 45%.    Coronary atherosclerosis of unspecified type of bypass graft(414.05)     Depressive disorder     Fatigue     H/O ambulatory blood pressure monitoring 10/25/2011    JNC-7 classification:  Stage 1 hypertension.    Headache(784.0)     Heart disease, unspecified     History of echocardiogram 05/11/2010    EF 60%.  Gr 2 DDfx.  LAE.  Mild MR.  Mild-mod PI; PASP 30 mmHg.    Iron deficiency anemia, unspecified     Migraine     Migraine headache     Myocardial infarction (HCC) 02/22/2010    Acute anterior wall w/primary intubation, stenting

## 2024-02-06 NOTE — ACP (ADVANCE CARE PLANNING)
Advanced Steps Advance Care Planning Conversation   POLST (Physician Order For Selective Treatments)       Date of conversation: 2/6/24   Location: [facility]    Advanced Steps® ACP Facilitator: Caitie Wild RN      Conversation Topics     Advance Care Planning Conversation  Received return call from patient's trust agent Marleny Tyson. Ms. Mehta was able to conference in Candice Jacobs, patient's MPOA. Ms Jacobs shared that she has been friends with the patient for over 50 years. She is aware that she is the MPOA and can make decisions for the patient. Candice cannot locate her copy of the Advanced Medical Directive, however, Ms Mehta has a copy and will email it to our department for our review.  Ms Mehta shared that patient's wishes are outlined in her Advanced Directive, MPOA is in agreement with said wishes.  Introduced the completion of Physician Order For Selective Treatments ( POLST).  POLST form emailed to trust agent as requested at: [email or web address]   Form will be scanned into medical record once it is returned back to our department.      Received copy of patient's AMD, scanned into medical record.    Order Elected for CPR:  []  Attempt Resuscitation [x]  Do Not Attempt Resuscitation      When NOT in Cardiopulmonary Arrest, Order Elected:      [] Comfort-focused Treatments  [x] Selective Treatments  [] Full Treatments    Artificially Administered Nutrition, Order Elected:    [x] No artificial means of nutrition desired  [] Trial period for artificial nutrition but no surgically placed tubes.  [] Feeding Tube through new or existing surgically  placed tubes              [] Not discussed or no decision made.    The following was provided (check all that apply):   [] Review of existing Advance Directive  [] Assistance with Completion of New Advance Directive   [x] Review of National POLST Form       Meeting Outcomes:   [x] ACP discussion completed   [x] National POLST form completed  [x] National  POLST

## 2024-02-06 NOTE — PROGRESS NOTES
Received pt from ED accompanied by her home caregiver Ms. Yadira Gallegos with phone number [phone]: pt continue pulling on lines , she removed her wristband, new dressing applied to right piv. Pt attempt getting out of bed on several occasions. Home caregiver asleep; bed alarm device cont'd for pt's safety. Pt reorient to place and situation, she verbalized understanding, will continue to monitor.

## 2024-02-06 NOTE — ED NOTES
Pt incontinent of urine, linens changed. New perwick placed, and clean diaper . Pt cleaned with warm wipes.

## 2024-02-06 NOTE — CONSULTS
[facility] - Consult Note    Cardiology consultation request from Mikala Salazar NP for evaluation and management/treatment of AF, CHF    Date of  Admission: 2/5/2024  9:35 AM   Primary Care Physician:  Chriss Lopez MD    Attending Cardiologist: Dr. Dorman       Assessment:     -AF RVR  -CHF, uncertain LVEF; last Echo 05/2010 with LVEF 60%  -CAD, s/p LHC in setting of STEMI 02/2010 that showed 100% pLAD lesion, underwent PCI utilizing 2.75 x 23 mm Cypher stent   -DENICE  -HLD  -Hx subdural hematoma    Remotely followed by Dr. Bennett, last seen 07/2019     Plan:     -Continue Cardizem infusion.  -Switch Metoprolol to PO Lopressor 25 mg BID; give Lopressor 12.5 mg now in addition to 12.5 mg Toprol given this AM.  -Continue gentle diuresis with IV Lasix 20 mg BID.  -Echo pending, will review results as able.  -Agree with ASA > DOAC with prior SDH.  -Recommend SLP evaluation, defer to primary team; caregiver reports coughing after eating, concern for aspiration.     History of Present Illness:     This is a 89 y.o. female admitted for Essential hypertension, benign [I10]  Atrial fibrillation with RVR (HCC) [I48.91].    Patient complains of: shortness of breath      Kamilah Villegas is a 89 y.o. female who presented to the hospital due to shortness of breath that started suddenly after getting up yesterday AM.  Pt found to be in afib on arrival and admitted for further management of afib and CHF.  Pt currently without complaints at time of evaluation this AM.  Caregiver also notes that pt has had a non-productive cough primarily at night and notes .  Denies chest pain, fever, BRBPR/melena.    Cardiac risk factors: advanced age (older than 55 for men, 65 for women) and dyslipidemia      Review of Symptoms:  Except as stated above include:  Constitutional:  negative  Respiratory:  As per HPI  Cardiovascular:  As per HPI  Gastrointestinal: negative  Genitourinary:  negative  Musculoskeletal:

## 2024-02-06 NOTE — PROGRESS NOTES
Advance Care Planning   Healthcare Decision Maker:    Primary Decision Maker: Candice Jacobs - Sally - [phone]    Click here to complete Healthcare Decision Makers including selection of the Healthcare Decision Maker Relationship (ie \"Primary\").  Today we documented Decision Maker(s). The patient will provide ACP documents.

## 2024-02-06 NOTE — PLAN OF CARE
Problem: Physical Therapy - Adult  Goal: By Discharge: Performs mobility at highest level of function for planned discharge setting.  See evaluation for individualized goals.  Description: Physical Therapy Goals:  Initiated 2/6/2024 to be met within 7-10 days.    1.  Patient will move from supine to sit and sit to supine  in bed with supervision/set-up.    2.  Patient will transfer from bed to chair and chair to bed with supervision/set-up using the least restrictive device.  3.  Patient will perform sit to stand with supervision/set-up.  4.  Patient will ambulate with supervision/set-up for 100 feet with the least restrictive device.     PLOF: Mod I with mobility using rollator. Lives alone on single level of home and has 24/7 caregivers.       Outcome: Progressing    PHYSICAL THERAPY EVALUATION    Patient: Kamilah Villegas (89 y.o. female)  Date: 2/6/2024  Primary Diagnosis: Essential hypertension, benign [I10]  Atrial fibrillation with RVR (HCC) [I48.91]       Precautions: Fall Risk, General Precautions,  ,  ,  ,  ,  ,  ,      ASSESSMENT :  Patient resting in bed upon entry with supportive caregiver at bedside. Patient is very [place]. Pleasant and cooperative. CGA supine to sit transfer. C/o need to frequently urinate related to UTI. Min A sit<>stand transfer needing increased assistance to complete pivot transfer to [facility] [place]. Posterior weight shift in standing ans generalized weakness noted. Unable to void, patient transfer mod A back to sitting EOB. Patient returns to bed and left resting with all needs in reach. Bed alarm activated for safety.     DEFICITS/IMPAIRMENTS:    , Body Structures, Functions, Activity Limitations Requiring Skilled Therapeutic Intervention: Decreased functional mobility ;Decreased safe awareness;Decreased strength;Decreased endurance;Decreased balance    Patient will benefit from skilled intervention to address the above impairments.  Patient's rehabilitation potential/Therapy

## 2024-02-07 PROBLEM — J96.01 ACUTE RESPIRATORY FAILURE WITH HYPOXIA (HCC): Status: ACTIVE | Noted: 2024-02-07

## 2024-02-07 PROBLEM — I50.9 ACUTE HEART FAILURE (HCC): Status: ACTIVE | Noted: 2024-02-07

## 2024-02-07 PROBLEM — R54 ADVANCED AGE: Status: ACTIVE | Noted: 2024-02-07

## 2024-02-07 PROBLEM — Z71.89 GOALS OF CARE, COUNSELING/DISCUSSION: Status: ACTIVE | Noted: 2024-02-07

## 2024-02-07 LAB
ANION GAP SERPL CALC-SCNC: 4 MMOL/L (ref 3–18)
BASOPHILS # BLD: 0.1 K/UL (ref 0–0.1)
BASOPHILS NFR BLD: 1 % (ref 0–2)
BUN SERPL-MCNC: 40 MG/DL (ref 7–18)
BUN/CREAT SERPL: 25 (ref 12–20)
CALCIUM SERPL-MCNC: 8.5 MG/DL (ref 8.5–10.1)
CHLORIDE SERPL-SCNC: 108 MMOL/L (ref 100–111)
CO2 SERPL-SCNC: 25 MMOL/L (ref 21–32)
CREAT SERPL-MCNC: 1.59 MG/DL (ref 0.6–1.3)
DIFFERENTIAL METHOD BLD: ABNORMAL
EOSINOPHIL # BLD: 0.1 K/UL (ref 0–0.4)
EOSINOPHIL NFR BLD: 1 % (ref 0–5)
ERYTHROCYTE [DISTWIDTH] IN BLOOD BY AUTOMATED COUNT: 15.6 % (ref 11.6–14.5)
GLUCOSE SERPL-MCNC: 111 MG/DL (ref 74–99)
HCT VFR BLD AUTO: 30.9 % (ref 35–45)
HGB BLD-MCNC: 10.1 G/DL (ref 12–16)
IMM GRANULOCYTES # BLD AUTO: 0.1 K/UL (ref 0–0.04)
IMM GRANULOCYTES NFR BLD AUTO: 1 % (ref 0–0.5)
LYMPHOCYTES # BLD: 1.8 K/UL (ref 0.9–3.6)
LYMPHOCYTES NFR BLD: 14 % (ref 21–52)
MAGNESIUM SERPL-MCNC: 2 MG/DL (ref 1.6–2.6)
MCH RBC QN AUTO: 31.1 PG (ref 24–34)
MCHC RBC AUTO-ENTMCNC: 32.7 G/DL (ref 31–37)
MCV RBC AUTO: 95.1 FL (ref 78–100)
MONOCYTES # BLD: 1.3 K/UL (ref 0.05–1.2)
MONOCYTES NFR BLD: 10 % (ref 3–10)
NEUTS SEG # BLD: 9.6 K/UL (ref 1.8–8)
NEUTS SEG NFR BLD: 75 % (ref 40–73)
NRBC # BLD: 0 K/UL (ref 0–0.01)
NRBC BLD-RTO: 0 PER 100 WBC
PLATELET # BLD AUTO: 132 K/UL (ref 135–420)
PMV BLD AUTO: 11.3 FL (ref 9.2–11.8)
POTASSIUM SERPL-SCNC: 4.3 MMOL/L (ref 3.5–5.5)
RBC # BLD AUTO: 3.25 M/UL (ref 4.2–5.3)
SODIUM SERPL-SCNC: 137 MMOL/L (ref 136–145)
WBC # BLD AUTO: 12.9 K/UL (ref 4.6–13.2)

## 2024-02-07 PROCEDURE — 6370000000 HC RX 637 (ALT 250 FOR IP): Performed by: INTERNAL MEDICINE

## 2024-02-07 PROCEDURE — 97116 GAIT TRAINING THERAPY: CPT

## 2024-02-07 PROCEDURE — 85025 COMPLETE CBC W/AUTO DIFF WBC: CPT

## 2024-02-07 PROCEDURE — 6370000000 HC RX 637 (ALT 250 FOR IP): Performed by: PHYSICIAN ASSISTANT

## 2024-02-07 PROCEDURE — 99232 SBSQ HOSP IP/OBS MODERATE 35: CPT | Performed by: HOSPITALIST

## 2024-02-07 PROCEDURE — 2580000003 HC RX 258

## 2024-02-07 PROCEDURE — 36415 COLL VENOUS BLD VENIPUNCTURE: CPT

## 2024-02-07 PROCEDURE — 6360000002 HC RX W HCPCS

## 2024-02-07 PROCEDURE — 80048 BASIC METABOLIC PNL TOTAL CA: CPT

## 2024-02-07 PROCEDURE — 1100000003 HC PRIVATE W/ TELEMETRY

## 2024-02-07 PROCEDURE — 83735 ASSAY OF MAGNESIUM: CPT

## 2024-02-07 PROCEDURE — 92526 ORAL FUNCTION THERAPY: CPT

## 2024-02-07 PROCEDURE — 97530 THERAPEUTIC ACTIVITIES: CPT

## 2024-02-07 PROCEDURE — 6370000000 HC RX 637 (ALT 250 FOR IP)

## 2024-02-07 PROCEDURE — 99232 SBSQ HOSP IP/OBS MODERATE 35: CPT | Performed by: INTERNAL MEDICINE

## 2024-02-07 RX ORDER — FUROSEMIDE 20 MG/1
20 TABLET ORAL DAILY
Status: DISCONTINUED | OUTPATIENT
Start: 2024-02-08 | End: 2024-02-09 | Stop reason: HOSPADM

## 2024-02-07 RX ADMIN — ASPIRIN 81 MG: 81 TABLET, COATED ORAL at 09:02

## 2024-02-07 RX ADMIN — SODIUM CHLORIDE, PRESERVATIVE FREE 10 ML: 5 INJECTION INTRAVENOUS at 20:00

## 2024-02-07 RX ADMIN — HEPARIN SODIUM 5000 UNITS: 5000 INJECTION INTRAVENOUS; SUBCUTANEOUS at 22:15

## 2024-02-07 RX ADMIN — FAMOTIDINE 20 MG: 20 TABLET ORAL at 09:02

## 2024-02-07 RX ADMIN — SODIUM CHLORIDE, PRESERVATIVE FREE 10 ML: 5 INJECTION INTRAVENOUS at 09:03

## 2024-02-07 RX ADMIN — METOPROLOL TARTRATE 25 MG: 25 TABLET, FILM COATED ORAL at 09:02

## 2024-02-07 RX ADMIN — FUROSEMIDE 20 MG: 10 INJECTION, SOLUTION INTRAMUSCULAR; INTRAVENOUS at 09:02

## 2024-02-07 RX ADMIN — METOPROLOL TARTRATE 25 MG: 25 TABLET, FILM COATED ORAL at 22:15

## 2024-02-07 RX ADMIN — ESCITALOPRAM OXALATE 5 MG: 5 TABLET, FILM COATED ORAL at 09:02

## 2024-02-07 RX ADMIN — ATORVASTATIN CALCIUM 40 MG: 40 TABLET, FILM COATED ORAL at 22:15

## 2024-02-07 RX ADMIN — HEPARIN SODIUM 5000 UNITS: 5000 INJECTION INTRAVENOUS; SUBCUTANEOUS at 09:02

## 2024-02-07 ASSESSMENT — PAIN SCALES - PAIN ASSESSMENT IN ADVANCED DEMENTIA (PAINAD)
BREATHING: 0
CONSOLABILITY: 0
TOTALSCORE: 0
NEGVOCALIZATION: 0
BODYLANGUAGE: 0
TOTALSCORE: 0
FACIALEXPRESSION: 0
BREATHING: 0
TOTALSCORE: 0
FACIALEXPRESSION: 0
FACIALEXPRESSION: 0
BODYLANGUAGE: 0
NEGVOCALIZATION: 0
CONSOLABILITY: 0
BREATHING: 0
NEGVOCALIZATION: 0
BODYLANGUAGE: 0
TOTALSCORE: 0
BODYLANGUAGE: 0
FACIALEXPRESSION: 0
NEGVOCALIZATION: 0
BREATHING: 0

## 2024-02-07 ASSESSMENT — PAIN SCALES - GENERAL
PAINLEVEL_OUTOF10: 0

## 2024-02-07 NOTE — PROGRESS NOTES
[facility]  Progress Note    Patient: Kamilah Villegas Age: 89 y.o. : 1923 MR#: [id number] SSN: [id number]  Date/Time: 2024     Subjective: Patient sitting in the side of the bed, feels good.  Able to communicate but slightly confused.  Caregiver at the bedside.     Assessment/Plan:   Atrial fibrillation with RVR  Acute Hypoxia  Acute Kidney Injury versus CKD  Acute systolic heart failure, EF 45%  QT prolongation- 498   Hx of Subdural Hematoma in 2019   Hypertension  GERD  Hyperlipidemia  Coronary artery disease  Underweight, cachectic         Plan:  Agree with PO Lasix, monitor I's/O  Echo noted  Off IV Cardizem drip, cont beta-blocker per cardiology  Continue aspirin  Continue bronchodilators and O2 supplement as needed  PT/OT eval and treatment      Discussed with patient, caregiver and also with the TING Harvey over the phone and explained about my above plan care.  Discussed about discharge process, prefer going home with home health care.    Disposition: Home tomorrow if remains stable      Case discussed with:  [x]Patient  [x]Family  [x]Nursing  []Case Management  DVT Prophylaxis:  []Lovenox  [x]Hep SQ  []SCDs  []Coumadin   []Eliquis/Xarelto     Objective:   VS: /70   Pulse 81   Temp 97.6 °F (36.4 °C) (Oral)   Resp 16   Ht 1.524 m (5')   Wt 49.9 kg (110 lb)   SpO2 95%   BMI 21.48 kg/m²    Tmax/24hrs: Temp (24hrs), Av.6 °F (36.4 °C), Min:97.2 °F (36.2 °C), Max:98 °F (36.7 °C)  IOBRIEF  Intake/Output Summary (Last 24 hours) at 2024 1541  Last data filed at 2024 1300  Gross per 24 hour   Intake 360 ml   Output 400 ml   Net -40 ml       General:  Alert, cooperative, no acute distress    Pulmonary:  CTA Bilaterally. No Wheezing/Rales.  Cardiovascular: IRRegular rate and Rhythm.  GI:  Soft, Non distended, Non tender. + Bowel sounds.  Extremities:  No edema. No calf tenderness.   Psych: Not anxious or agitated.  Neurologic: Alert and

## 2024-02-07 NOTE — PLAN OF CARE
impairments. Continue treatment per established plan of care.    Frequency/Duration: Patient will be followed by speech-language pathology 1x to address goals.    Discharge Recommendations: D/C Recommendations: No follow up therapy recommended post discharge     SUBJECTIVE:   Patient stated “I'm doing ok”.    OBJECTIVE:   Daily Assessment:  Baseline Assessment  Respiratory Status: Room air  O2 Device: None (Room air)  Communication Observation: Functional  Follows Directions: Simple  Current Diet : Regular  Current Liquid Diet : Thin  Dentition: Adequate  Patient Positioning: Upright in bed  Baseline Vocal Quality: Normal  Volitional Cough: Strong    Orientation:  Person and Place    Dysphagia Treatment:  Oral Assessment:  Oral Motor   Labial: No impairment  Dentition: Intact  Oral Hygiene: Moist, Clean  Lingual: No impairment  Mandible: No impairment        P.O. Trials:   PO Trials  Assessment Method(s): Observation, Palpation  Vocal Quality: No Impairment  Consistency Presented: Regular, Pureed, Thin  How Presented: Self-fed/presented, Straw, Spoon  Bolus Acceptance: No impairment  Bolus Formation/Control: No impairment  Propulsion: No impairment  Oral Residue: None  Initiation of Swallow: No impairment  Laryngeal Elevation: Functional  Aspiration Signs/Symptoms: None  Pharyngeal Phase Characteristics: No impairment, issues, or problems             DYSPHAGIA DIAGNOSIS: Dysphagia Diagnosis: Swallow function appears WFL    PAIN:  Start of Tx: 0  End of Tx: 0     After treatment:   []              Patient left in no apparent distress sitting up in chair  [x]              Patient left in no apparent distress in bed  [x]              Call bell left within reach  []              Nursing notified  []              Family present  [x]              Caregiver present  []              Bed alarm activated      COMMUNICATION/EDUCATION:   [x]            Aspiration precautions; swallow safety; compensatory techniques provided via  demonstration, verbalization and teach back of comprehension  []         Patient/family have participated as able in goal setting and plan of care.  []            Patient/family agree to work toward stated goals and plan of care.  []            Patient understands intent and goals of therapy, neutral about participation.  []            Patient unable to participate in goal setting/plan of care secondary to cognition, hearing/vision deficits; education ongoing with interdisciplinary staff   []            Handout regarding diet recommendations and thickener instructions provided.  []         Posted safety precautions in patient's room.    Thank you for this referral.    Maggie Henderson M.S. CCC-SLP  Speech Language Pathologist

## 2024-02-07 NOTE — CARE COORDINATION
comment)  (Caregiver)   Primary Caregiver Private caregiver   Accompanied By/Relationship Private caregiver from [employer]   Support Systems Home Care Staff   Patient's Healthcare Decision Maker is: Named in Scanned ACP Document   PCP Verified by CM Yes   Last Visit to PCP Within last 3 months   Prior Functional Level Assistance with the following:;Bathing;Dressing;Toileting;Feeding;Cooking;Housework;Shopping;Mobility   Current Functional Level Assistance with the following:;Bathing;Dressing;Toileting;Feeding;Cooking;Housework;Shopping;Mobility   Can patient return to prior living arrangement Yes   Ability to make needs known: Other (see comment)   Family able to assist with home care needs: Other (comment)  (Has private duty caregivers 24/7)   Financial Resources Medicare   Social/Functional History   Lives With Alone   Type of Home House   Home Layout Able to Live on Main level with bedroom/bathroom   Bathroom Shower/Tub Walk-in shower   Bathroom Toilet Handicap height   Bathroom Equipment Built-in shower seat   Bathroom Accessibility Walker accessible   Home Equipment Walker, rolling;Rollator;Wheelchair-manual   Receives Help From Personal care attendant   ADL Assistance Needs assistance   Ambulation Assistance Needs assistance   Transfer Assistance Needs assistance   Active  No   Mode of Transportation Other  (Caregivers)   Discharge Planning   Type of Residence House   Living Arrangements Other (Comment)   Current Services Prior To Admission Private Duty Homecare   Potential Assistance Needed N/A   DME Ordered? No   Potential Assistance Purchasing Medications No   Patient expects to be discharged to: House   One/Two Story Residence Two story, live on first floor   Services At/After Discharge   Transition of Care Consult (CM Consult) N/A   Mode of Transport at Discharge BLS   Confirm Follow Up Transport Self   Condition of Participation: Discharge Planning   The Patient and/or Patient Representative was  provided with a Choice of Provider? Patient Representative   Name of the Patient Representative who was provided with the Choice of Provider and agrees with the Discharge Plan?  Candice [place]   The Patient and/Or Patient Representative agree with the Discharge Plan? Yes   Freedom of Choice list was provided with basic dialogue that supports the patient's individualized plan of care/goals, treatment preferences, and shares the quality data associated with the providers?  Yes         Juana Winter RN  Case Management Department  Ph: [phone]

## 2024-02-07 NOTE — PLAN OF CARE
Problem: Physical Therapy - Adult  Goal: By Discharge: Performs mobility at highest level of function for planned discharge setting.  See evaluation for individualized goals.  Description: Physical Therapy Goals:  Initiated 2/6/2024 to be met within 7-10 days.    1.  Patient will move from supine to sit and sit to supine  in bed with supervision/set-up.    2.  Patient will transfer from bed to chair and chair to bed with supervision/set-up using the least restrictive device.  3.  Patient will perform sit to stand with supervision/set-up.  4.  Patient will ambulate with supervision/set-up for 100 feet with the least restrictive device.     PLOF: Mod I with mobility using rollator. Lives alone on single level of home and has 24/7 caregivers.       Outcome: Progressing     PHYSICAL THERAPY TREATMENT    Patient: Kamilah Villegas (89 y.o. female)  Date: 2/7/2024  Diagnosis: Essential hypertension, benign [I10]  Atrial fibrillation with RVR (HCC) [I48.91] Atrial fibrillation with RVR (HCC)      Precautions: Fall Risk, General Precautions      ASSESSMENT:  Pt received in bed and agreeable, private caregiver present for support. Pt required min A to sit on EOB. Stood to RW with min A. Agreeable to amb to/from bathroom for toileting. CGA x 15 ft to bathroom with use of RW for support. Pt with good sitting balance while seated on toileting with no LOB. Pt notably fatigued following toileting reporting \"my arms are tired.\" Given min A for safety and steadying to amb the 15 ft back to the bed as pt declines sitting up in recliner. Pt has difficulty keeping walker close to self and picking feet up when taking steps on  return trip to bed. Upon sitting down, pt appears to fall asleep right away. Given max A to return fully supine safely. Scooted up in bed with max A x 2. HR 90 and pt mumbles \"thank you\" upon therapist leaving. Left with all needs met. Will continue to benefit from acute PT.    Progression toward goals:   [x]

## 2024-02-07 NOTE — PROGRESS NOTES
[facility] - [facility] Note    Admit Date: 2/5/2024  Attending Cardiologist: Dr. Darci Salazar    Assessment:     -AF RVR  -HFmrEF  Echo 2/6/2024 with LVEF 45-50%  -CAD, s/p C in setting of STEMI 02/2010 that showed 100% pLAD lesion, underwent PCI utilizing 2.75 x 23 mm Cypher stent   -DENICE  -HLD  -Hx subdural hematoma  -DNR status     Remotely followed by Dr. Bennett, last seen 07/2019    Plan:       I saw, evaluated, interviewed and examined the patient personally.  No specific cardiac complaint  No angina or heart failure  Heart rate in 80s  Agree with discontinuing Cardizem infusion and changed to oral beta-blocker or AV jackie blocking agent  Aspirin for now.  Would not recommend aggressive anticoagulation because of advanced age fall risk  Changed to oral Lasix likely tomorrow  No further cardiac workup is planned.  Once discharged, follow-up with primary cardiologist in 4 weeks    Darci Salazar MD       -Will d/c Cardizem infusion, HR controlled, continue PO Lopressor.  -Continue ASA 81 mg.  Suboptimal candidate for DOAC due to advanced age, fall risk.  -Will transition to PO Lasix to start tomorrow.    Subjective:     No new complaints.     Objective:      Patient Vitals for the past 8 hrs:   Temp Pulse Resp BP SpO2   02/07/24 1200 97.6 °F (36.4 °C) 81 16 115/70 95 %   02/07/24 0800 97.8 °F (36.6 °C) 69 16 (!) 147/75 93 %         Patient Vitals for the past 96 hrs:   Weight   02/06/24 1524 49.9 kg (110 lb)   02/06/24 0315 50.2 kg (110 lb 10.7 oz)   02/05/24 0943 45.4 kg (100 lb)       TELE: AFIB               Current Facility-Administered Medications   Medication Dose Route Frequency    escitalopram (LEXAPRO) tablet 5 mg  5 mg Oral Daily    perflutren lipid microspheres (DEFINITY) injection 2 mL  2 mL IntraVENous ONCE PRN    metoprolol tartrate (LOPRESSOR) tablet 25 mg  25 mg Oral BID    LORazepam (ATIVAN) tablet 0.25 mg  0.25 mg Oral Q6H PRN    sodium chloride flush 0.9 % injection 5-40 mL  5-40 mL    Component Value Date/Time    TROPHS 34 02/05/2024 10:04 AM    TROPHS 25 10/06/2022 06:10 PM    TROPHS 282 08/26/2022 02:22 AM      Lipid Panel Lab Results   Component Value Date/Time    CHOL 201 09/21/2019 04:04 AM    HDL 88 09/21/2019 04:04 AM      BNP Lab Results   Component Value Date/Time    NTPROBNP 10,689 02/05/2024 10:04 AM      Liver Enzymes Lab Results   Component Value Date    ALT 38 02/05/2024    AST 44 (H) 02/05/2024    ALKPHOS 65 02/05/2024    BILITOT 0.7 02/05/2024      Thyroid Studies Lab Results   Component Value Date/Time    TSH 1.750 09/21/2019 04:04 AM          Signed By: Miriam Jones PA-C     February 7, 2024

## 2024-02-08 PROBLEM — I07.1 MODERATE TRICUSPID VALVE REGURGITATION: Status: ACTIVE | Noted: 2024-02-08

## 2024-02-08 PROBLEM — I34.0 MODERATE MITRAL VALVE REGURGITATION: Status: ACTIVE | Noted: 2024-02-08

## 2024-02-08 PROBLEM — I50.32 CHRONIC HEART FAILURE WITH PRESERVED EJECTION FRACTION (HCC): Status: ACTIVE | Noted: 2024-02-08

## 2024-02-08 LAB
ANION GAP SERPL CALC-SCNC: 5 MMOL/L (ref 3–18)
BUN SERPL-MCNC: 41 MG/DL (ref 7–18)
BUN/CREAT SERPL: 25 (ref 12–20)
CALCIUM SERPL-MCNC: 8.6 MG/DL (ref 8.5–10.1)
CHLORIDE SERPL-SCNC: 109 MMOL/L (ref 100–111)
CO2 SERPL-SCNC: 26 MMOL/L (ref 21–32)
CREAT SERPL-MCNC: 1.66 MG/DL (ref 0.6–1.3)
GLUCOSE SERPL-MCNC: 111 MG/DL (ref 74–99)
POTASSIUM SERPL-SCNC: 4.4 MMOL/L (ref 3.5–5.5)
SODIUM SERPL-SCNC: 140 MMOL/L (ref 136–145)

## 2024-02-08 PROCEDURE — 6370000000 HC RX 637 (ALT 250 FOR IP): Performed by: INTERNAL MEDICINE

## 2024-02-08 PROCEDURE — 6370000000 HC RX 637 (ALT 250 FOR IP)

## 2024-02-08 PROCEDURE — 80048 BASIC METABOLIC PNL TOTAL CA: CPT

## 2024-02-08 PROCEDURE — 99232 SBSQ HOSP IP/OBS MODERATE 35: CPT | Performed by: HOSPITALIST

## 2024-02-08 PROCEDURE — 6370000000 HC RX 637 (ALT 250 FOR IP): Performed by: PHYSICIAN ASSISTANT

## 2024-02-08 PROCEDURE — 6370000000 HC RX 637 (ALT 250 FOR IP): Performed by: HOSPITALIST

## 2024-02-08 PROCEDURE — 97535 SELF CARE MNGMENT TRAINING: CPT

## 2024-02-08 PROCEDURE — 2580000003 HC RX 258

## 2024-02-08 PROCEDURE — 1100000003 HC PRIVATE W/ TELEMETRY

## 2024-02-08 PROCEDURE — 92526 ORAL FUNCTION THERAPY: CPT

## 2024-02-08 PROCEDURE — 36415 COLL VENOUS BLD VENIPUNCTURE: CPT

## 2024-02-08 PROCEDURE — 94761 N-INVAS EAR/PLS OXIMETRY MLT: CPT

## 2024-02-08 PROCEDURE — 97530 THERAPEUTIC ACTIVITIES: CPT

## 2024-02-08 PROCEDURE — 99232 SBSQ HOSP IP/OBS MODERATE 35: CPT | Performed by: INTERNAL MEDICINE

## 2024-02-08 PROCEDURE — 6360000002 HC RX W HCPCS

## 2024-02-08 RX ORDER — MECOBALAMIN 5000 MCG
5 TABLET,DISINTEGRATING ORAL NIGHTLY PRN
Status: DISCONTINUED | OUTPATIENT
Start: 2024-02-08 | End: 2024-02-09 | Stop reason: HOSPADM

## 2024-02-08 RX ORDER — METOPROLOL TARTRATE 50 MG/1
50 TABLET, FILM COATED ORAL 2 TIMES DAILY
Status: DISCONTINUED | OUTPATIENT
Start: 2024-02-08 | End: 2024-02-09 | Stop reason: HOSPADM

## 2024-02-08 RX ADMIN — ESCITALOPRAM OXALATE 5 MG: 5 TABLET, FILM COATED ORAL at 09:40

## 2024-02-08 RX ADMIN — SODIUM CHLORIDE, PRESERVATIVE FREE 10 ML: 5 INJECTION INTRAVENOUS at 09:40

## 2024-02-08 RX ADMIN — METOPROLOL TARTRATE 25 MG: 25 TABLET, FILM COATED ORAL at 09:39

## 2024-02-08 RX ADMIN — LORAZEPAM 0.25 MG: 0.5 TABLET ORAL at 23:05

## 2024-02-08 RX ADMIN — METOPROLOL TARTRATE 50 MG: 50 TABLET, FILM COATED ORAL at 21:52

## 2024-02-08 RX ADMIN — ASPIRIN 81 MG: 81 TABLET, COATED ORAL at 09:39

## 2024-02-08 RX ADMIN — HEPARIN SODIUM 5000 UNITS: 5000 INJECTION INTRAVENOUS; SUBCUTANEOUS at 21:52

## 2024-02-08 RX ADMIN — FUROSEMIDE 20 MG: 20 TABLET ORAL at 09:39

## 2024-02-08 RX ADMIN — METOPROLOL TARTRATE 25 MG: 25 TABLET, FILM COATED ORAL at 14:53

## 2024-02-08 RX ADMIN — SODIUM CHLORIDE, PRESERVATIVE FREE 10 ML: 5 INJECTION INTRAVENOUS at 21:54

## 2024-02-08 RX ADMIN — Medication 5 MG: at 01:15

## 2024-02-08 RX ADMIN — ATORVASTATIN CALCIUM 40 MG: 40 TABLET, FILM COATED ORAL at 21:52

## 2024-02-08 RX ADMIN — FAMOTIDINE 20 MG: 20 TABLET ORAL at 09:40

## 2024-02-08 RX ADMIN — HEPARIN SODIUM 5000 UNITS: 5000 INJECTION INTRAVENOUS; SUBCUTANEOUS at 09:39

## 2024-02-08 RX ADMIN — Medication 5 MG: at 21:53

## 2024-02-08 ASSESSMENT — PAIN SCALES - PAIN ASSESSMENT IN ADVANCED DEMENTIA (PAINAD)
TOTALSCORE: 0
NEGVOCALIZATION: 0
FACIALEXPRESSION: 0
FACIALEXPRESSION: 0
CONSOLABILITY: 0
FACIALEXPRESSION: 0
CONSOLABILITY: 0
CONSOLABILITY: 0
BREATHING: 0
CONSOLABILITY: 0
FACIALEXPRESSION: 0
BREATHING: 0
BREATHING: 0
BODYLANGUAGE: 0
BODYLANGUAGE: 0
NEGVOCALIZATION: 0
BREATHING: 0
TOTALSCORE: 0
TOTALSCORE: 0
NEGVOCALIZATION: 0
BODYLANGUAGE: 0
NEGVOCALIZATION: 0
BODYLANGUAGE: 0
BREATHING: 0
TOTALSCORE: 0
FACIALEXPRESSION: 0
CONSOLABILITY: 0
TOTALSCORE: 0
NEGVOCALIZATION: 0
BODYLANGUAGE: 0

## 2024-02-08 ASSESSMENT — PAIN SCALES - GENERAL
PAINLEVEL_OUTOF10: 0
PAINLEVEL_OUTOF10: 0

## 2024-02-08 NOTE — PLAN OF CARE
Problem: Occupational Therapy - Adult  Goal: By Discharge: Performs self-care activities at highest level of function for planned discharge setting.  See evaluation for individualized goals.  Description: Occupational Therapy Goals:  Initiated 2/6/2024 to be met within 7-10 days.    1.  Patient will perform grooming with modified independence.   2.  Patient will perform lower body bathing with minimal assistance/contact guard assist.  3.  Patient will perform lower body dressing  with minimal assistance/contact guard assist.  4.  Patient will perform toilet transfers with supervision/set-up using RW with good balance.  5.  Patient will perform all aspects of toileting with supervision/set-up.  6.  Patient will participate in upper extremity therapeutic exercise/activities with supervision/set-up for 8-10 minutes to increase strength/endurance for ADLs.    7.  Patient will utilize energy conservation techniques during functional activities with min verbal cues.    PLOF: Caregiver/HH aide reports 24/7 supv with 9 HH aides rotating, assist with ADLs and Supv for toilet transfers using rollator and supv toilet tasks      Outcome: Progressing   OCCUPATIONAL THERAPY TREATMENT    Patient: Kamilah Villegas (89 y.o. female)  Date: 2/8/2024  Diagnosis: Essential hypertension, benign [I10]  Atrial fibrillation with RVR (HCC) [I48.91] Atrial fibrillation with RVR (HCC)      Precautions: Fall Risk, General Precautions    Chart, occupational therapy assessment, plan of care, and goals were reviewed.  ASSESSMENT:  Pt is pleasantly confused. Caregiver at bedside. Pt agreeable to OOB activity. Requires increase time w/bed mobility to maneuver to EOB. Decrease dynamic standing balance requires CGA w/functional transfer to chair for carryover w/toileting transfer/ADL. Pt performs simple ADL grooming tasks at chair level to increase activity tolerance. RUE edematous. Elevated at end of session. -130s throughout session. No

## 2024-02-08 NOTE — PROGRESS NOTES
edema. No calf tenderness.   Psych: Not anxious or agitated.  Neurologic: Alert and oriented X 2-3. Moves all ext.  Additional:    Medications:   Current Facility-Administered Medications   Medication Dose Route Frequency    melatonin disintegrating tablet 5 mg  5 mg Oral Nightly PRN    metoprolol tartrate (LOPRESSOR) tablet 50 mg  50 mg Oral BID    furosemide (LASIX) tablet 20 mg  20 mg Oral Daily    escitalopram (LEXAPRO) tablet 5 mg  5 mg Oral Daily    perflutren lipid microspheres (DEFINITY) injection 2 mL  2 mL IntraVENous ONCE PRN    LORazepam (ATIVAN) tablet 0.25 mg  0.25 mg Oral Q6H PRN    sodium chloride flush 0.9 % injection 5-40 mL  5-40 mL IntraVENous 2 times per day    sodium chloride flush 0.9 % injection 5-40 mL  5-40 mL IntraVENous PRN    polyethylene glycol (GLYCOLAX) packet 17 g  17 g Oral Daily PRN    famotidine (PEPCID) tablet 20 mg  20 mg Oral Daily    acetaminophen (TYLENOL) tablet 650 mg  650 mg Oral Q6H PRN    Or    acetaminophen (TYLENOL) suppository 650 mg  650 mg Rectal Q6H PRN    heparin (porcine) injection 5,000 Units  5,000 Units SubCUTAneous BID    aspirin EC tablet 81 mg  81 mg Oral Daily    atorvastatin (LIPITOR) tablet 40 mg  40 mg Oral Nightly    prochlorperazine (COMPAZINE) injection 10 mg  10 mg IntraVENous Q6H PRN       Labs:    Recent Results (from the past 24 hour(s))   Basic Metabolic Panel w/ Reflex to MG    Collection Time: 02/08/24 12:44 AM   Result Value Ref Range    Sodium 140 136 - 145 mmol/L    Potassium 4.4 3.5 - 5.5 mmol/L    Chloride 109 100 - 111 mmol/L    CO2 26 21 - 32 mmol/L    Anion Gap 5 3.0 - 18 mmol/L    Glucose 111 (H) 74 - 99 mg/dL    BUN 41 (H) 7.0 - 18 MG/DL    Creatinine 1.66 (H) 0.6 - 1.3 MG/DL    Bun/Cre Ratio 25 (H) 12 - 20      Est, Glom Filt Rate 27 (L) >60 ml/min/1.73m2    Calcium 8.6 8.5 - 10.1 MG/DL       Signed By: Hunter Pro MD     February 8, 2024      Disclaimer: Sections of this note are dictated using utilizing voice  recognition software.  Minor typographical errors may be present. If questions arise, please do not hesitate to contact me or call our department.

## 2024-02-08 NOTE — PROGRESS NOTES
Cardiology Progress Note    Admit Date: 2/5/2024  Attending Cardiologist: Dr. Chavez    IMPRESSION  Atrial fibrillation with rapid ventricular response  Chronic heart failure medium reduced ejection fraction, by 2d echo 2/6/2024  CAD history of [facility] of STEMI 2/2010 with 100% pLAD  Moderate mitral valve regurgitation  Moderate tricuspid valve regurgitation  HLD  History of subdural hematoma  DNR Status    PLAN  Increase metoprolol from 25mg po bid to 50mg po bid, reevaluate the telemetry  Suboptimal candidate for DOAC with 89 years, increased fall risk advancing age.    GDMT for heart failure, 2g of salt 2L of fluid daily  Continue with lipitor 40mg po hs  Continue aspirin 81mg po daily    Subjective:     Patient denies dyspnea, abdominal pains, or chest pains.      Objective:      Patient Vitals for the past 8 hrs:   Temp Pulse Resp BP SpO2   02/08/24 1045 98.7 °F (37.1 °C) 83 20 115/73 94 %   02/08/24 0815 98.3 °F (36.8 °C) (!) 123 16 (!) 162/88 95 %   02/08/24 0713 -- (!) 111 -- -- --         Patient Vitals for the past 96 hrs:   Weight   02/06/24 1524 49.9 kg (110 lb)   02/06/24 0315 50.2 kg (110 lb 10.7 oz)   02/05/24 0943 45.4 kg (100 lb)             Intake/Output Summary (Last 24 hours) at 2/8/2024 1428  Last data filed at 2/7/2024 1744  Gross per 24 hour   Intake 240 ml   Output --   Net 240 ml     EXAMINATION:  General:  Alert, cooperative, no distress  Head:  Normocephalic, without obvious abnormality, atraumatic.  Eyes:  Conjunctivae/corneas clear  Lungs:   Clear to auscultation bilaterally, no wheezes, no rales, no rhonchi  Heart:  INCREASED rate and irregular rhythm, S1, S2 normal, no murmur, click, rub or gallop.  Abdomen:   Soft, non-tender. Bowel sounds normal.   Extremities: Extremities normal, no edema  Skin:  No rashes or lesions visible extremities  Neurologic:  Normal strength, sensation    Visit Vitals  /73   Pulse 83   Temp 98.7 °F (37.1 °C) (Oral)   Resp 20   Ht 1.524 m (5')   Wt 49.9 kg  (110 lb)   SpO2 94%   BMI 21.48 kg/m²       Data Review:     Labs: Results:       Chemistry Recent Labs     02/06/24  0359 02/07/24  0410 02/08/24  0044    137 140   K 4.3 4.3 4.4    108 109   CO2 21 25 26   BUN 33* 40* 41*   MG  --  2.0  --       CBC w/Diff Recent Labs     02/06/24  0359 02/07/24  0410   WBC 10.0 12.9   RBC 3.41* 3.25*   HGB 10.6* 10.1*   HCT 33.5* 30.9*    132*      Cardiac Enzymes @[id number](CPK:*,CK:*,CPKMB:*,CKRMB:*,CKMMB:*,CKMB:*,RCK3:*,CKMBT:*,CKMBPC:*,CKSMB:*,CKNDX:*,CKND1:*,ROMAIN:*,TROQR:*,TROPT:*,TROIQ:*,TROIP:*,RUTH ANN:*,TROPIT:*,TROPT:*,TRPOIT:*,ITNL:*,TNIPOC:*,BNP:*,BNPP:*,PBNP:*)@   Coagulation No results for input(s): \"INR\", \"APTT\" in the last 72 hours.    Invalid input(s): \"PTP\"    Lipid Panel Lab Results   Component Value Date/Time    CHOL 201 09/21/2019 04:04 AM    HDL 88 09/21/2019 04:04 AM      BNP No results found for: \"BNP\", \"BNPPOC\"   Liver Enzymes No results for input(s): \"TP\", \"ALB\" in the last 72 hours.    Invalid input(s): \"TBIL\", \"AP\", \"SGOT\", \"GPT\", \"DBIL\"   Thyroid Studies Lab Results   Component Value Date/Time    TSH 1.750 09/21/2019 04:04 AM          Signed By: CHAVA SWANSON MD     February 8, 2024

## 2024-02-08 NOTE — PLAN OF CARE
(14 without stairs) or greater is associated with a discharge to the patient's home setting. Based on an AM-PAC score and their current functional mobility deficits, it is recommended that the patient have 2-3 sessions per week of Physical Therapy at d/c to increase the patient's independence.    This AMPAC score should be considered in conjunction with interdisciplinary team recommendations to determine the most appropriate discharge setting. Patient's social support, diagnosis, medical stability, and prior level of function should also be taken into consideration.     SUBJECTIVE:   Patient stated, \"No thank you.\"    OBJECTIVE DATA SUMMARY:   Critical Behavior:  Orientation  Overall Orientation Status: Impaired  Orientation Level: Oriented to person;Oriented to place       Functional Mobility Training:  Bed Mobility:  Bed Mobility Training  Bed Mobility Training: Yes  Supine to Sit: Stand-by assistance  Scooting: Stand-by assistance  Transfers:  Transfer Training  Transfer Training: Yes  Sit to Stand: Contact-guard assistance  Stand to Sit: Stand-by assistance  Bed to Chair: Contact-guard assistance  Balance:  Balance  Sitting: Intact  Standing: Impaired  Standing - Static: Good  Standing - Dynamic: Fair     Ambulation/Gait Training:     Gait  Gait Training: Yes  Overall Level of Assistance: Contact-guard assistance  Distance (ft): 5 Feet  Assistive Device: Walker, rolling  Speed/Lizbet: Slow;Pace decreased (< 100 feet/min)  Step Length: Right shortened;Left shortened  Gait Abnormalities: Decreased step clearance      Pain:  Pain level pre-treatment: 0/10  Pain level post-treatment: 0/10       Activity Tolerance:   Activity Tolerance: Patient tolerated treatment well  Please refer to the flowsheet for vital signs taken during this treatment.  After treatment:   [x] Patient left in no apparent distress sitting up in chair  [] Patient left in no apparent distress in bed  [x] Call bell left within reach  [x] Nursing  notified  [x] Caregiver present  [] Bed alarm activated  [] SCDs applied      COMMUNICATION/EDUCATION:   Patient Education  Education Given To: Patient;Caregiver  Education Provided: Role of Therapy;Plan of Care;Fall Prevention Strategies  Education Method: Verbal;Teach Back;Demonstration  Barriers to Learning: None  Education Outcome: Verbalized understanding;Continued education needed;Demonstrated understanding      Suzette Meyer, PT  Minutes: 15

## 2024-02-08 NOTE — PALLIATIVE CARE
Palliative Medicine Consult  [facility]: [phone]  [facility]: [phone]    Goals of care defined.  Palliative team will sign off.  Please consult team as needed, if appropriate.   Thank you for the Palliative Medicine consult and allowing us to participate in the care of Ms. Villegas.           CODE STATUS -DNR/DNI POLST form and AMD on file     Meghana ZHANG, RN, CHPN  Palliative Medicine Inpatient RN  [facility]   Palliative [facility] Line: [phone]

## 2024-02-08 NOTE — PROGRESS NOTES
0532 Patient assisted to bedside commode.Patient  up x 1 stand and pivot with a walker. Patient visibly weak. Noted shortness of breath on exertion that resolved once assisted back to bed

## 2024-02-08 NOTE — PLAN OF CARE
Problem: SLP Adult - Impaired Swallowing  Goal: By Discharge: Advance to least restrictive diet without signs or symptoms of aspiration for planned discharge setting.  See evaluation for individualized goals.  Description: Patient will:  1. Tolerate PO trials with 0 s/s overt distress in 4/5 trials  2. Utilize compensatory swallow strategies/maneuvers (decrease bite/sip, size/rate, alt. liq/sol) with min cues in 4/5 trials      Rec:     Soft and bite sized solids with thin liquids  Aspiration precautions  HOB >45 during po intake, remain >30 for 30-45 minutes after po   Small bites/sips; alternate liquid/solid with slow feeding rate   Oral care TID  Meds per pt preference      Outcome: Progressing   SPEECH LANGUAGE PATHOLOGY DYSPHAGIA TREATMENT    Patient: Kamilah Villegas (89 y.o. female)  Date: 2/8/2024  Diagnosis: Essential hypertension, benign [I10]  Atrial fibrillation with RVR (HCC) [I48.91] Atrial fibrillation with RVR (HCC)      Precautions: Standard, aspiration  PLOF: As per H&P      ASSESSMENT:  Pt seen for follow up dysphagia management. Pt sitting upright in chair on room air for PO trials. Pt's caregiver reports increased appetite with breakfast tray this am. Pt tolerating reg solids and thin liquids + straw consecutive swallows with timely swallow initiation and adequate laryngeal elevation by observation and palpation with no overt s/s of aspiration. Pt's caregiver would like to remain on soft and bite sized for now for ease of mastication. Recommend continuation of soft and bite sized solids and thin liquids, aspiration precautions, oral care TID, and meds as tolerated.     Progression toward goals:  [x]         Improving appropriately and progressing toward goals  []         Improving slowly and progressing toward goals  []         Not making progress toward goals and plan of care will be adjusted     PLAN:  Recommendations and Planned Interventions:   Patient continues to benefit from skilled

## 2024-02-09 ENCOUNTER — HOME CARE VISIT (OUTPATIENT)
Age: 89
End: 2024-02-09
Payer: MEDICARE

## 2024-02-09 ENCOUNTER — HOSPICE ADMISSION (OUTPATIENT)
Age: 89
End: 2024-02-09
Payer: MEDICARE

## 2024-02-09 VITALS
RESPIRATION RATE: 18 BRPM | WEIGHT: 110 LBS | SYSTOLIC BLOOD PRESSURE: 140 MMHG | HEART RATE: 91 BPM | OXYGEN SATURATION: 96 % | HEIGHT: 60 IN | TEMPERATURE: 98 F | BODY MASS INDEX: 21.6 KG/M2 | DIASTOLIC BLOOD PRESSURE: 78 MMHG

## 2024-02-09 VITALS
TEMPERATURE: 97.7 F | OXYGEN SATURATION: 98 % | SYSTOLIC BLOOD PRESSURE: 104 MMHG | HEART RATE: 94 BPM | DIASTOLIC BLOOD PRESSURE: 49 MMHG | RESPIRATION RATE: 18 BRPM

## 2024-02-09 PROBLEM — J96.01 ACUTE RESPIRATORY FAILURE WITH HYPOXIA (HCC): Status: RESOLVED | Noted: 2024-02-07 | Resolved: 2024-02-09

## 2024-02-09 PROCEDURE — 92526 ORAL FUNCTION THERAPY: CPT

## 2024-02-09 PROCEDURE — 2580000003 HC RX 258

## 2024-02-09 PROCEDURE — 6360000002 HC RX W HCPCS

## 2024-02-09 PROCEDURE — 2500000001 HSPC NON INJECTABLE MED

## 2024-02-09 PROCEDURE — G0299 HHS/HOSPICE OF RN EA 15 MIN: HCPCS

## 2024-02-09 PROCEDURE — 94761 N-INVAS EAR/PLS OXIMETRY MLT: CPT

## 2024-02-09 PROCEDURE — 6370000000 HC RX 637 (ALT 250 FOR IP): Performed by: HOSPITALIST

## 2024-02-09 PROCEDURE — 97535 SELF CARE MNGMENT TRAINING: CPT

## 2024-02-09 PROCEDURE — 0651 HSPC ROUTINE HOME CARE

## 2024-02-09 PROCEDURE — 6370000000 HC RX 637 (ALT 250 FOR IP): Performed by: INTERNAL MEDICINE

## 2024-02-09 PROCEDURE — 6370000000 HC RX 637 (ALT 250 FOR IP): Performed by: PHYSICIAN ASSISTANT

## 2024-02-09 PROCEDURE — 99233 SBSQ HOSP IP/OBS HIGH 50: CPT | Performed by: INTERNAL MEDICINE

## 2024-02-09 PROCEDURE — 99239 HOSP IP/OBS DSCHRG MGMT >30: CPT | Performed by: HOSPITALIST

## 2024-02-09 PROCEDURE — 6370000000 HC RX 637 (ALT 250 FOR IP)

## 2024-02-09 RX ORDER — LORAZEPAM 2 MG/ML
0.5 CONCENTRATE ORAL EVERY 4 HOURS PRN
Qty: 30 ML | Refills: 0 | Status: SHIPPED | OUTPATIENT
Start: 2024-02-09 | End: 2024-02-23

## 2024-02-09 RX ORDER — ASPIRIN 81 MG/1
81 TABLET ORAL DAILY
Qty: 30 TABLET | Refills: 3 | Status: SHIPPED | OUTPATIENT
Start: 2024-02-10

## 2024-02-09 RX ORDER — DILTIAZEM HYDROCHLORIDE 120 MG/1
120 CAPSULE, COATED, EXTENDED RELEASE ORAL DAILY
Qty: 30 CAPSULE | Refills: 0 | Status: SHIPPED | OUTPATIENT
Start: 2024-02-09 | End: 2024-03-10

## 2024-02-09 RX ORDER — GUAIFENESIN/DEXTROMETHORPHAN 100-10MG/5
5 SYRUP ORAL 3 TIMES DAILY PRN
Qty: 120 ML | Refills: 0 | Status: SHIPPED | OUTPATIENT
Start: 2024-02-09 | End: 2024-02-19

## 2024-02-09 RX ORDER — MORPHINE SULFATE 100 MG/5ML
5 SOLUTION ORAL
Qty: 30 ML | Refills: 0 | Status: SHIPPED | OUTPATIENT
Start: 2024-02-09 | End: 2024-02-14

## 2024-02-09 RX ORDER — ACETAMINOPHEN 650 MG/1
650 SUPPOSITORY RECTAL EVERY 4 HOURS PRN
Qty: 10 SUPPOSITORY | Refills: 0 | Status: SHIPPED | OUTPATIENT
Start: 2024-02-09

## 2024-02-09 RX ORDER — BISACODYL 10 MG
10 SUPPOSITORY, RECTAL RECTAL DAILY PRN
Qty: 10 SUPPOSITORY | Refills: 0 | Status: SHIPPED | OUTPATIENT
Start: 2024-02-09 | End: 2024-03-10

## 2024-02-09 RX ORDER — METOPROLOL TARTRATE 50 MG/1
50 TABLET, FILM COATED ORAL 2 TIMES DAILY
Qty: 60 TABLET | Refills: 3 | Status: SHIPPED | OUTPATIENT
Start: 2024-02-09

## 2024-02-09 RX ORDER — FUROSEMIDE 20 MG/1
20 TABLET ORAL DAILY
Qty: 60 TABLET | Refills: 3 | Status: SHIPPED | OUTPATIENT
Start: 2024-02-10

## 2024-02-09 RX ADMIN — FUROSEMIDE 20 MG: 20 TABLET ORAL at 08:58

## 2024-02-09 RX ADMIN — FAMOTIDINE 20 MG: 20 TABLET ORAL at 08:58

## 2024-02-09 RX ADMIN — HEPARIN SODIUM 5000 UNITS: 5000 INJECTION INTRAVENOUS; SUBCUTANEOUS at 08:58

## 2024-02-09 RX ADMIN — METOPROLOL TARTRATE 50 MG: 50 TABLET, FILM COATED ORAL at 08:58

## 2024-02-09 RX ADMIN — DILTIAZEM HYDROCHLORIDE 30 MG: 30 TABLET ORAL at 11:49

## 2024-02-09 RX ADMIN — ESCITALOPRAM OXALATE 5 MG: 5 TABLET, FILM COATED ORAL at 08:58

## 2024-02-09 RX ADMIN — ASPIRIN 81 MG: 81 TABLET, COATED ORAL at 08:58

## 2024-02-09 RX ADMIN — SODIUM CHLORIDE, PRESERVATIVE FREE 10 ML: 5 INJECTION INTRAVENOUS at 08:58

## 2024-02-09 ASSESSMENT — PAIN SCALES - GENERAL
PAINLEVEL_OUTOF10: 0

## 2024-02-09 ASSESSMENT — ENCOUNTER SYMPTOMS: CONTUSION: 1

## 2024-02-09 NOTE — ACP (ADVANCE CARE PLANNING)
Advance Care Planning       Palliative Care Re-Consult      Date of Re-Consult: 2/9/2024    Conducted with: Patient, patient's friend/MPOA (Candice Jacobs), Dr. Devendra Salazar (Palliative) and this writer.      Healthcare Decision Maker:    Patient has an existing advance medical directive (AMD) that has been scanned in to the EMR.    Primary Decision Maker: Candice Jacobs - Sally - [phone]      Content/Action Overview:    Has ACP document(s) on file - reflects the patient's care preferences  Reviewed DNR/DNI and patient's friend/MPOA verified patient's DNR/DNI code status. Patient has an existing POLST that is scanned in to the EMR. The topic of hospice was also discussed. Friend/MPOBAL agrees that patient should now transition to hopsice. Hospice consult was made.    ----------------------------------------------------------------  Advance Care Planning Conversation     The patient and/or family consented to a voluntary Advance Care Planning conversation. Individuals present for the conversation: Patient, patient's friend/MPOA (Candice Freyjenelle), Dr. Devendra Salazar (Palliative) and this writer.      Outcome of the conversation and documents completed: This writer, along with Dr. Devendra Salazar, with the Palliative Care team; visited with patient today to offer support. Patient was laying in bed; alert but confused. Patient's caregiver was at the bedside. The Palliative Care team had spoken with patient's friend/MPOA (Candice) earlier.     Patient is not improving and she is a candidate for hospice services; upon discharge. Candice agreed to patient now being transitioned to hospice; upon discharge. Patient and caregiver were notified of the decision to transition to hospice. Patient verbalized wanting to return home and be kept comfortable. The hospice consult was placed.     Patient has an existing POLST on file. Hospice will obtain their own DNR paperwork. At this time, patient will remain a DNR/DNI with [employer]

## 2024-02-09 NOTE — PLAN OF CARE
Problem: Occupational Therapy - Adult  Goal: By Discharge: Performs self-care activities at highest level of function for planned discharge setting.  See evaluation for individualized goals.  Description: Occupational Therapy Goals:  Initiated 2/6/2024 to be met within 7-10 days.    1.  Patient will perform grooming with modified independence.   2.  Patient will perform lower body bathing with minimal assistance/contact guard assist.  3.  Patient will perform lower body dressing  with minimal assistance/contact guard assist.  4.  Patient will perform toilet transfers with supervision/set-up using RW with good balance.  5.  Patient will perform all aspects of toileting with supervision/set-up.  6.  Patient will participate in upper extremity therapeutic exercise/activities with supervision/set-up for 8-10 minutes to increase strength/endurance for ADLs.    7.  Patient will utilize energy conservation techniques during functional activities with min verbal cues.    PLOF: Caregiver/HH aide reports 24/7 supv with 9 HH aides rotating, assist with ADLs and Supv for toilet transfers using rollator and supv toilet tasks      Outcome: Progressing   OCCUPATIONAL THERAPY TREATMENT    Patient: Kamilah Villegas (89 y.o. female)  Date: 2/9/2024  Diagnosis: Essential hypertension, benign [I10]  Atrial fibrillation with RVR (HCC) [I48.91] Atrial fibrillation with rapid ventricular response (HCC)      Precautions: Fall Risk, General Precautions,  ,  ,  ,  ,  ,  ,      Chart, occupational therapy assessment, plan of care, and goals were reviewed.  ASSESSMENT:  Pt presents in bed with caregiver at bedside. Pt agrees to simple grooming task of brushing teeth in stand at sink with S after s/u with vc's for sequencing. Pt demo functional mobility from bed to bathroom and to bed using RW and SBA and increased time required. Pt is left in bed with all needs met and caregiver in room.    Progression toward goals:  [x]

## 2024-02-09 NOTE — PROGRESS NOTES
Palliative Medicine follow-up progress note  [facility]: [phone]  [facility]: [phone]   [facility]: [phone]    Patient Name: Kamilah Villegas  YOB: 1923    Date of Initial Consult: 2/7/2024  Date of service:2/9/24  Reason for Consult: goals of care   Requesting Provider: Dr Fernández    Primary Care Physician: Quinton Landers MD      SUMMARY:   Kamilah Villegas is a 89 y.o. year old with a past history of CAD, depression, migraines, hyperlipidemia , who was admitted on 2/5/2024 from home  with a diagnosis of a fib with RVR, and acute hypoxic resp failure. Patient has 24 hr care givers at home. Care givers noted increased shortness of breath and wheezing and brought patient to the hospital.Current medical issues leading to Palliative Medicine involvement include: to discuss goals of care.    2/9/24: Patient was seen in presence of palliative care medical social worker.  Patient is awake and able to answer some questions appropriately.  She remains otherwise confused and denies for having any pain.  Patient's caregiver is at bedside.     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  Patient is awake and answer some simple questions.  She remains otherwise pleasantly confused.  She knows her date of birth and home address but could not tell me the name of [place] president.  She could not tell me the name of caregiver who is at bedside.  No chest pain or abdominal pain currently.  Patient continues to have tachycardia.  Patient's appetite remains poor.  We discussed with patient's about going home and her goals of quality of life.  She prefers going  stenting of the LAD on 02/22/10/EF 45%.    Coronary atherosclerosis of unspecified type of bypass graft(414.05)     Depressive disorder     Fatigue     H/O ambulatory blood pressure monitoring 10/25/2011    JNC-7 classification:  Stage 1 hypertension.    Headache(784.0)     Heart disease, unspecified     History of echocardiogram 05/11/2010    EF 60%.  Gr 2 DDfx.  LAE.  Mild MR.  Mild-mod PI; PASP 30 mmHg.    Iron deficiency anemia, unspecified     Migraine     Migraine headache     Myocardial infarction (HCC) 02/22/2010    Acute anterior wall w/primary intubation, stenting of prox LAD w/ 2.75-mm Cypher stent.    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    S/P cardiac cath 02/22/2010    mRCA 35%. LM patent. Cx 25%. pLAD 100% (2.75 x 23 mm Cypher stent) LVEDP  20.  EF 45%. Anteroapical mod HK    SDH (subdural hematoma) 09/19/2019    Senile cataract, unspecified     Valvular heart disease     Mitral valve prolapse.      Past Surgical History:   Procedure Laterality Date    APPENDECTOMY      BREAST LUMPECTOMY      Left.    CARDIAC CATHETERIZATION      CATARACT REMOVAL  5/1/2013, 5/8/2013    left and right    CORONARY ANGIOPLASTY WITH STENT PLACEMENT  2/22/10    prox LAD w/ 2.75-mm Cypher stent.    TONSILLECTOMY        Family History   Problem Relation Age of Onset    Heart Disease Mother     Heart Disease Father      History reviewed, no pertinent family history.  Social History     Tobacco Use    Smoking status: Never    Smokeless tobacco: Never   Substance Use Topics    Alcohol use: No     Allergies   Allergen Reactions    Vilazodone Anaphylaxis and Dizziness or Vertigo    Carvedilol Other (See Comments)     Pt states coreg makes her feel like a \"zombie\"    Erythromycin Nausea Only     Severe.    Penicillins Hives      Current Facility-Administered Medications   Medication Dose Route

## 2024-02-09 NOTE — CARE COORDINATION
Requested Case Management specialist to assist with transportation to home.  Address is [address] [place] [address]  [facility] [address] and phone number is [phone]  Patient will require BLS transport.   Pt requires Stretcher If stretcher, reason: Hospice, Advanced age  Patient is currently requiring oxygen: no    Height: 5   Weight: 110 lb  Pt is on isolation: No   Is the pt ready now? Yes  Requested time: Next Available  PCS Faxed: No  Insurance verified on face sheet: Yes  Auth needed for transport: No  CM completed PCS/ Envelope and placed on chart.         CHIDI SchmidtN, RN  Care Management  Phone: [phone]

## 2024-02-09 NOTE — PROGRESS NOTES
Hospice referral received. Chart review in process.      Thank you for the referral to [facility]. If we can be of further assistance please contact [phone].     Letty Cooper MDIV, BSN, RN  Hospice Liaison  [address], [place] [address]  Office: [phone]  Fax: [phone]  Mobile:  [phone]  Email: [email or web address]

## 2024-02-09 NOTE — PROGRESS NOTES
Reviewed verbal and written discharge instructions with the caregiver. Caregiver verbalized understanding.

## 2024-02-09 NOTE — DISCHARGE SUMMARY
(TTE) COMPLETE (PRN CONTRAST/BUBBLE/STRAIN/3D) 02/06/2024  4:28 PM (Final)    Interpretation Summary    Left Ventricle: Reduced left ventricular systolic function with a visually estimated EF of 45 - 50%. Left ventricle is smaller than normal. Normal wall thickness. Global hypokinesis present.    Aortic Valve: Moderate annular calcification. Mild regurgitation.    Mitral Valve: Moderate regurgitation.    Tricuspid Valve: Moderate regurgitation.    Left Atrium: Left atrium is dilated.    Right Atrium: Right atrium is dilated.    Extracardiac: Left pleural effusion.    Image quality is adequate.    Signed by: Jason Chavez MD on 2/6/2024  4:28 PM      Discharge Medications:     Current Discharge Medication List             Details   aspirin 81 MG EC tablet Take 1 tablet by mouth daily  Qty: 30 tablet, Refills: 3      metoprolol tartrate (LOPRESSOR) 50 MG tablet Take 1 tablet by mouth 2 times daily  Qty: 60 tablet, Refills: 3    Associated Diagnoses: Atrial fibrillation with RVR ([facility])      dilTIAZem (CARDIZEM CD) 120 MG extended release capsule Take 1 capsule by mouth daily  Qty: 30 capsule, Refills: 0      furosemide (LASIX) 20 MG tablet Take 1 tablet by mouth daily  Qty: 60 tablet, Refills: 3    Associated Diagnoses: Acute on chronic diastolic CHF (congestive heart failure) ([facility])      guaiFENesin-dextromethorphan (ROBITUSSIN DM) 100-10 MG/5ML syrup Take 5 mLs by mouth 3 times daily as needed for Cough  Qty: 120 mL, Refills: 0      bisacodyl (DULCOLAX) 10 MG suppository Place 1 suppository rectally daily as needed for Constipation  Qty: 10 suppository, Refills: 0      hyoscyamine (LEVSIN/SL) 125 MCG sublingual tablet Place 1 tablet under the tongue every 6 hours as needed for Cramping  Qty: 10 tablet, Refills: 0      acetaminophen (TYLENOL) 650 MG suppository Place 1 suppository rectally every 4 hours as needed for Fever  Qty: 10 suppository, Refills: 0      LORazepam (ATIVAN) 2 MG/ML concentrated solution Take 0.25  [id number]  [phone]                       Minutes spent on discharge: 40 minutes spent coordinating this discharge (review instructions/follow-up, prescriptions, preparing report for sign off)    Hunter Pro MD  2/9/2024 3:50 PM    Disclaimer: Sections of this note are dictated using utilizing voice recognition software. Minor typographical errors may be present. If questions arise, please do not hesitate to contact me or call our department.

## 2024-02-09 NOTE — PLAN OF CARE
Problem: SLP Adult - Impaired Swallowing  Goal: By Discharge: Advance to least restrictive diet without signs or symptoms of aspiration for planned discharge setting.  See evaluation for individualized goals.  Description: Patient will:  1. Tolerate PO trials with 0 s/s overt distress in 4/5 trials  2. Utilize compensatory swallow strategies/maneuvers (decrease bite/sip, size/rate, alt. liq/sol) with min cues in 4/5 trials      Rec:     Soft and bite sized solids with thin liquids  Aspiration precautions  HOB >45 during po intake, remain >30 for 30-45 minutes after po   Small bites/sips; alternate liquid/solid with slow feeding rate   Oral care TID  Meds per pt preference      Outcome: Progressing  SPEECH LANGUAGE PATHOLOGY DYSPHAGIA TREATMENT    Patient: Kamilah Villegas (89 y.o. female)  Date: 2/9/2024  Diagnosis: Essential hypertension, benign [I10]  Atrial fibrillation with RVR (HCC) [I48.91] Atrial fibrillation with rapid ventricular response (HCC)      Precautions: Standard, aspiration  PLOF: As per H&P      ASSESSMENT:  Pt seen for follow up dysphagia management. Pt lethargic and caregiver requesting to let her rest. Caregiver reports no swallowing difficulty with meals on this date. Reviewed aspiration precautions and s/sx of aspiration to look for during meals. Additionally, provided education on safe swallow techniques to increase safety during meals. Recommend continuation of soft and bite sized solids and thin liquids, aspiration precautions, oral care TID, and meds as tolerated.     Progression toward goals:  [x]         Improving appropriately and progressing toward goals  []         Improving slowly and progressing toward goals  []         Not making progress toward goals and plan of care will be adjusted     PLAN:  Recommendations and Planned Interventions:   Patient continues to benefit from skilled intervention to address the above impairments. Continue treatment per established plan of  care.    Frequency/Duration: Patient will be followed by speech-language pathology 1x to address goals.    Discharge Recommendations: D/C Recommendations: No follow up therapy recommended post discharge     SUBJECTIVE:   Patient stated “tired”.    OBJECTIVE:   Daily Assessment:  Baseline Assessment  Respiratory Status: Room air  O2 Device: None (Room air)  Communication Observation: Functional  Follows Directions: Simple  Current Diet : Soft and bite sized  Current Liquid Diet : Thin  Dentition: Adequate  Patient Positioning: Upright in bed  Baseline Vocal Quality: Normal  Volitional Cough: Strong    Orientation:  Person    Dysphagia Treatment:  Oral Assessment:  Oral Motor   Labial: No impairment  Dentition: Intact  Oral Hygiene: Moist, Clean  Lingual: No impairment  Mandible: No impairment        P.O. Trials:   PO Trials  Assessment Method(s): Observation, Palpation  Vocal Quality: No Impairment  Consistency Presented: Regular, Pureed, Thin  How Presented: Self-fed/presented, Straw, Spoon  Bolus Acceptance: No impairment  Bolus Formation/Control: No impairment  Propulsion: No impairment  Oral Residue: None  Initiation of Swallow: No impairment  Laryngeal Elevation: Functional  Aspiration Signs/Symptoms: None  Pharyngeal Phase Characteristics: No impairment, issues, or problems             DYSPHAGIA DIAGNOSIS: Dysphagia Diagnosis: Swallow function appears WFL    PAIN:  Start of Tx: 0  End of Tx: 0     After treatment:   []              Patient left in no apparent distress sitting up in chair  [x]              Patient left in no apparent distress in bed  [x]              Call bell left within reach  []              Nursing notified  []              Family present  []              Caregiver present  []              Bed alarm activated      COMMUNICATION/EDUCATION:   [x]            Aspiration precautions; swallow safety; compensatory techniques provided via demonstration, verbalization and teach back of

## 2024-02-09 NOTE — DISCHARGE INSTRUCTIONS
Discharge Instructions    Patient: Kamilah Villegas MRN: [id number]  [facility]: [id number]    YOB: 1923  Age: 89 y.o.  Sex: female    DOA: 2/5/2024       DIET:  cardiac diet    ACTIVITY: activity as tolerated  Home health care for Skilled care for hospice care    ADDITIONAL INFORMATION: If you experience any of the following symptoms but not limited to Fever, chills, nausea, vomiting, diarrhea, change in mentation, falling, bleeding, shortness of breath, chest pain, please call your hospice because are not    FOLLOW UP CARE:  PCP as needed      Hunter Pro MD  2/9/2024 3:50 PM        DISCHARGE SUMMARY from Nurse    PATIENT INSTRUCTIONS:    After general anesthesia or intravenous sedation, for 24 hours or while taking prescription Narcotics:  Limit your activities  Do not drive and operate hazardous machinery  Do not make important personal or business decisions  Do  not drink alcoholic beverages  If you have not urinated within 8 hours after discharge, please contact your surgeon on call.    Report the following to your surgeon:  Excessive pain, swelling, redness or odor of or around the surgical area  Temperature over 100.5  Nausea and vomiting lasting longer than 4 hours or if unable to take medications  Any signs of decreased circulation or nerve impairment to extremity: change in color, persistent  numbness, tingling, coldness or increase pain  Any questions    What to do at Home:  Recommended activity: bedrest.    If you experience any of the following symptoms: shortness of breath, please follow up with your primary care physician or return to the ED.    *  Please give a list of your current medications to your Primary Care Provider.    *  Please update this list whenever your medications are discontinued, doses are      changed, or new medications (including over-the-counter products) are added.    *  Please carry medication information at all times in case of emergency  situations.    These are general instructions for a healthy lifestyle:    No smoking/ No tobacco products/ Avoid exposure to second hand smoke  Surgeon General's Warning:  Quitting smoking now greatly reduces serious risk to your health.    Obesity, smoking, and sedentary lifestyle greatly increases your risk for illness    A healthy diet, regular physical exercise & weight monitoring are important for maintaining a healthy lifestyle    You may be retaining fluid if you have a history of heart failure or if you experience any of the following symptoms:  Weight gain of 3 pounds or more overnight or 5 pounds in a week, increased swelling in our hands or feet or shortness of breath while lying flat in bed.  Please call your doctor as soon as you notice any of these symptoms; do not wait until your next office visit.        The discharge information has been reviewed with the caregiver.  The caregiver verbalized understanding.  Discharge medications reviewed with the caregiver and appropriate educational materials and side effects teaching were provided.  ___________________________________________________________________________________________________________________________________

## 2024-02-09 NOTE — PROGRESS NOTES
Discussed patient with Dr. Salazar.  Liaison met with patient's friend/MPOA Candice Jacobs who is inpatient in Room 471 at [facility].  Ms. Jacobs was in bed, A&Ox3.  Candice's daughter Paulette was also present.  Introduced myself and reason for visit.  Hospice discussed.  Candice acknowledged understanding hospice and agrees to patient having hospice services at discharge. Per Candice, patient has 24/hr caregiver in the home.  She gave permission to call patient's caregiver El Schultz to coordinate equipment delivery.  Hospice consents were signed by Candice.      Following visit, Liaison reached out to [employer] to discuss DME. [employer] is agreeable to equipment being delivered today and discharge today if ready.    Pt/family pursuing hospice: Yes  Admission dx approved by Hospice Medical Director: (Pending)  Anticipated hospital d/c date: 2/9/2024 if medically ready  Discussion occurred with patient and/or family about preference of hospitalization once admitted to hospice: Yes  Reviewed and discussed hospice philosophy and keeping the patient comfortable in their residence: Yes  Discussion with patient/family regarding around the clock caregiver in place due to patient safety in the home once discharged: Yes     DME:  Patient has walker, shower chair and BSC in the home already.  Declined hospital bed.  Bedside table, rollator, oxygen, and wheelchair will be delivered on 2/9.  Order [id number]     Provider:  Please send scripts for comfort medications to the outpatient pharmacy to be filled and sent home with the pt at discharge.      1. Morphine concentrate 20mg/ml  Give 0.25-1ml po/sl every 3 hours PRN for pain/air hunger  Quantity 30ml     2. Lorazepam oral solution 2mg/ml   Give 0.25 to 0.5ml (0.5 to 1mg) po/sl every 6 hrs PRN anxiety/agitation   Quantity 30 ml     3. Hyoscyamine 0.125mg tablets  Give 1 tab po/sl every 6 hours PRN terminal congestion  Quantity 10 tabs.      4.  Bisacodyl Suppository 10mg  Give one suppository rectally

## 2024-02-09 NOTE — CARE COORDINATION
Discharge order noted for today. Pt has been accepted to [facility]. Met with patient and his caregiver and both are agreeable to the transition plan today. Transport has been arranged through [facility].  Updated bedside RN, Kasey,  to the transition plan.  Discharge information has been documented on the AVS.       VICENTE Schmidt, RN  Care Management  Phone: [phone]

## 2024-02-09 NOTE — CARE COORDINATION
Called [facility] at [phone] scheduled 5:30 pm transport to patient's home ([address] [place] [address], [place] [address]) with Sharri and received [facility] ID [id number].  Informed [facility] transportation has been scheduled.

## 2024-02-09 NOTE — PROGRESS NOTES
Physician Progress Note      PATIENT:               MASTER ALMANZAR  CSN #:                  [id number]  :                       1923  ADMIT DATE:       2024 9:35 AM  DISCH DATE:  RESPONDING  PROVIDER #:        Hunter Pro MD          QUERY TEXT:    Patient admitted with Atrial fibrillation with RVR. Noted documentation of   acute respiratory failure in H&P. In order to support the diagnosis of acute   respiratory failure, please include additional clinical indicators in your   documentation.  Or please document if the diagnosis of acute respiratory   failure has been ruled out after further study.    The medical record reflects the following:  Risk Factors: CHF  Clinical Indicators:  H&P:  Acute Hypoxic Respiratory Failure 2/2 Acute CHF -   CXR with pulm edema, elevated proBNP, SOB  RR 18, 34, 16, 20  Treatment: O2 2L to room air    Acute Respiratory Failure Clinical Indicators per [facility] MS-DRG Training Guide and   Quick Reference Guide:  pO2 < 60 mmHg or SpO2 (pulse oximetry) < 91% breathing room air  pCO2 > 50 and pH < 7.35  P/F ratio (pO2 / FIO2) < 300  pO2 decrease or pCO2 increase by 10 mmHg from baseline (if known)  Supplemental oxygen of 40% or more  Presence of respiratory distress, tachypnea, dyspnea, shortness of breath,   wheezing  Unable to speak in complete sentences  Use of accessory muscles to breathe  Extreme anxiety and feeling of impending doom  Tripod position  Confusion/altered mental status/obtunded    Thank you,  Siobhan Soto RN/CCDS  [email or web address]  Options provided:  -- Acute Respiratory Failure as evidenced by, Please document evidence.  -- Acute Respiratory Failure ruled out after study  -- Other - I will add my own diagnosis  -- Disagree - Not applicable / Not valid  -- Disagree - Clinically unable to determine / Unknown  -- Refer to Clinical Documentation Reviewer    PROVIDER RESPONSE TEXT:    Acute Respiratory Failure has been ruled out

## 2024-02-10 ENCOUNTER — HOME CARE VISIT (OUTPATIENT)
Age: 89
End: 2024-02-10
Payer: MEDICARE

## 2024-02-10 VITALS
HEART RATE: 92 BPM | TEMPERATURE: 97.7 F | DIASTOLIC BLOOD PRESSURE: 63 MMHG | SYSTOLIC BLOOD PRESSURE: 122 MMHG | RESPIRATION RATE: 18 BRPM | OXYGEN SATURATION: 98 %

## 2024-02-10 PROCEDURE — 0651 HSPC ROUTINE HOME CARE

## 2024-02-10 PROCEDURE — G0299 HHS/HOSPICE OF RN EA 15 MIN: HCPCS

## 2024-02-10 ASSESSMENT — ENCOUNTER SYMPTOMS: CONTUSION: 1

## 2024-02-10 NOTE — HOSPICE
Medication refills ordered this visit: No refills needed. Patient has alll comfort medications in the home.    Medications reconciled and all medications are available in the home this visit. Education was provided regarding medications, medication interactions, and look alike medications.     Response to teaching. CG verbalized an understanding. New caregiver in the home today (she only works every other Saturday). Oxygen safety, medication administration, hospice philosophy and 24-hour number reviewed. This will need to be done with each new caregiver until they are educated on hospice, symptoms, and how to manage them.    Supplies by type and quantity ordered this visit include: None ordered    Consulted medical director/attending physician regarding: Regarding continuing centrum and dulcolax capsules.    Instructed patient/family/caregiver on 24-hour hospice availability and phone number.    Plan for next visit: Continuing hospice education with change of caregivers. Monitor shortness of breath and caregivers understanding of treatment for shortness of breath. Monitor ability to ambulate safely and safe use of rollator with ambulation.

## 2024-02-10 NOTE — HOSPICE
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 Patient has elected hospice services and is no longer seeking aggressive treatment.  Co-morbidities related to the terminal diagnosis are debility, malnutrition, CKD, A-Fib, CAD, and history of subdural hematoma.  Patient also has a past medical history of GERD and hyperlipidemia.    Mrs. Kamilah Villegas (Anne)'s status 6 months - 1 year prior to hospice admission .was living in her home with 24-hour paid caregivers. Per the caregiver, patient was able to ambulate independently (sometimes using a walker), she was continent and able to ambulate to the bathroom alone. She was able to feed herself. She did require maximum assistance with showering/bathing, cooking, cleaning and laundry. She has a trust agent that has been overseeing her finances and bill payment. She was alert and oriented to person, place, and situation. Per caregiver, the patient became very short of breath several days ago and that is why she was hospitalized. She was found to have A-fib causing her weakness and shortness of breath.  The patient/caregiver is present and willing and safely able to provide care and administer medications, their availability is daily. Patient's MPOA is hospitalized at this time and we are contacting the POA's daughter Paulette with updates on the patient. 24-hour paid caregivers provide care in the patient's home.  The patient/caregiver participated in goal setting, care planning, and are agreeable to the care plan. Admission booklet reviewed with patient/caregiver, services provided under hospice benefit, review of rights and responsibilities, disposal of medications, contact information for , Joint Commission, Medicare, O, and outside resources for independence.  The patient/family/caregiver/facility educated on IDT and their right to

## 2024-02-11 PROCEDURE — 0651 HSPC ROUTINE HOME CARE

## 2024-02-12 PROCEDURE — 0651 HSPC ROUTINE HOME CARE

## 2024-02-13 ENCOUNTER — HOME CARE VISIT (OUTPATIENT)
Age: 89
End: 2024-02-13
Payer: MEDICARE

## 2024-02-13 PROCEDURE — G0299 HHS/HOSPICE OF RN EA 15 MIN: HCPCS

## 2024-02-13 PROCEDURE — G0155 HHCP-SVS OF CSW,EA 15 MIN: HCPCS

## 2024-02-13 PROCEDURE — 0651 HSPC ROUTINE HOME CARE

## 2024-02-13 NOTE — HOSPICE
Patient was in her recliner falling a sleep. Caregiver was tight by her side. Patient does not talk much but  will continue to follow.

## 2024-02-14 ENCOUNTER — HOME CARE VISIT (OUTPATIENT)
Age: 89
End: 2024-02-14
Payer: MEDICARE

## 2024-02-14 VITALS
TEMPERATURE: 99.1 F | RESPIRATION RATE: 18 BRPM | DIASTOLIC BLOOD PRESSURE: 76 MMHG | SYSTOLIC BLOOD PRESSURE: 134 MMHG | HEART RATE: 96 BPM | OXYGEN SATURATION: 96 %

## 2024-02-14 PROCEDURE — G0155 HHCP-SVS OF CSW,EA 15 MIN: HCPCS

## 2024-02-14 PROCEDURE — 0651 HSPC ROUTINE HOME CARE

## 2024-02-14 NOTE — HOSPICE
The purpose of today's visit is to complete an initial SW assessment for Kamilah \"Kristina\" Nelly.  Kristina Villegas is a 89 year old female admitted to hospice on 2/09/24 with a terminal diagnosis of senile degeneration and CHF.  SW arrived at the one story, single family residence of pt.  There are 3 steps to gain entry to the side door-1st door from the driveway.  The main door would be preferred for use if transport/medical staff had to take pt out of home.  There are also 3 steps at main entrance.  The home is well kept, tidy, with no visible safety concerns noted.  Pt was received in her bedroom, sleeping comfortably in her own bed.  Pt did not rouse to CG and SW talking.  CG stated she had just laid down about 30 minutes prior to SW arrival and she is a sound sleeper.  Pt was dressed appropriately and there are no visible signs of abuse/neglect noted.  Pt was using O2 and CG states that use of O2 is new since recent hospitalization.  CG states pt is able to get around the home with the assistance of Rolator and stand by assistance of CG's.  Pt has paid CG's in the home 24/7.  CG does request an HHA be assigned stating that the pt has become weaker since her return from the hospital and she would be more comfortable with the assistance of HHA's for ADL care.  SW will request RN CM to assign aide.  CG states pt has a good appetite and eats 2 full meals a day and drinks Ensure to supplement one meal.  CG notes that pt has become increasingly lethargic since her return and sleeps approximately 12 hours a day.  Prior to admission she slept about 8-9 hours a day.  CG reports pt is also experiencing some new depression, as she is no longer able to be as active as she was prior to her hospitalization.  CG states the pt has not been told she is on hospice at this time.  SW encourages CG's to be honest with pt and if assistance is needed in telling pt, SW or  would provide emotional support.  Pt owned a local family

## 2024-02-14 NOTE — HOSPICE
Patient/Caregiver/Family findings/issues during SN visit:  none identified    Medication refills ordered this visit:  none needed    Medications reconciled and all medications are available in the home this visit.  Education was provided regarding medications, medication interactions, and look alike medications.   Response to teaching.  PCG verbalized understnding.  Medications are effective at this time.      Supplies by type and quantity ordered this visit include: none needed    Consulted medical director/attending physician regarding:  Not at this time    Instructed patient/family/caregiver on 24-hour hospice availability and phone number.    Plan for next visit:  Continued EOL education nd support

## 2024-02-15 ENCOUNTER — HOME CARE VISIT (OUTPATIENT)
Age: 89
End: 2024-02-15
Payer: MEDICARE

## 2024-02-15 VITALS
OXYGEN SATURATION: 93 % | SYSTOLIC BLOOD PRESSURE: 113 MMHG | HEART RATE: 66 BPM | TEMPERATURE: 97.5 F | DIASTOLIC BLOOD PRESSURE: 59 MMHG | RESPIRATION RATE: 14 BRPM

## 2024-02-15 PROCEDURE — G0299 HHS/HOSPICE OF RN EA 15 MIN: HCPCS

## 2024-02-15 PROCEDURE — 0651 HSPC ROUTINE HOME CARE

## 2024-02-15 NOTE — HOSPICE
Patient/Caregiver/Family/Facility findings/issues during SN visit:  N/A    Medication refills ordered this visit: Will have to follow up on [place]    Medications reconciled and all medications are available in the home this visit. Medications are effective/not effective at this time.      Supplies by type and quantity ordered this visit include: N/A    Consulted medical director/attending physician regarding: N/A    Instructed patient/family/caregiver on 24-hour hospice availability and phone number.    Plan for next visit: Will follow up with CM, on Monday.  Arrived at patients home, found patient sitting on her couch, watching tv. Patient dd not seem to be in any distress. Patient, did not complain of any SOB. Asked about patients pain, Patient denied any pain at this time. Vitals were taken, all were wnl. Will cintinue to follow up with patient.

## 2024-02-16 ENCOUNTER — HOME CARE VISIT (OUTPATIENT)
Age: 89
End: 2024-02-16
Payer: MEDICARE

## 2024-02-16 PROCEDURE — 2500000001 HSPC NON INJECTABLE MED

## 2024-02-16 PROCEDURE — 0651 HSPC ROUTINE HOME CARE

## 2024-02-17 PROCEDURE — 0651 HSPC ROUTINE HOME CARE

## 2024-02-18 PROCEDURE — 0651 HSPC ROUTINE HOME CARE

## 2024-02-19 PROCEDURE — 0651 HSPC ROUTINE HOME CARE

## 2024-02-20 ENCOUNTER — HOME CARE VISIT (OUTPATIENT)
Age: 89
End: 2024-02-20
Payer: MEDICARE

## 2024-02-20 PROCEDURE — G0156 HHCP-SVS OF AIDE,EA 15 MIN: HCPCS

## 2024-02-20 PROCEDURE — 0651 HSPC ROUTINE HOME CARE

## 2024-02-20 PROCEDURE — G0299 HHS/HOSPICE OF RN EA 15 MIN: HCPCS

## 2024-02-21 VITALS
DIASTOLIC BLOOD PRESSURE: 94 MMHG | TEMPERATURE: 97.5 F | RESPIRATION RATE: 20 BRPM | SYSTOLIC BLOOD PRESSURE: 120 MMHG | HEART RATE: 101 BPM | OXYGEN SATURATION: 97 %

## 2024-02-21 PROCEDURE — 0651 HSPC ROUTINE HOME CARE

## 2024-02-21 ASSESSMENT — ENCOUNTER SYMPTOMS: CONTUSION: 1

## 2024-02-21 NOTE — HOSPICE
Patient/Caregiver/Family findings/issues during SN visit:  None identified    Medication refills ordered this visit: None needed    Medications reconciled and all medications are available in the home this visit.  Education was provided regarding medications, medication interactions, and look alike medications.   Response to teaching.  PCG verbalizes undertanding.   Medications are effective at this time.      Supplies by type and quantity ordered this visit include:  Medium briefs, wipes, chux, inserts, medium gloves ordered    Consulted medical director/attending physician regarding: Not needed at this time    Instructed patient/family/caregiver on 24-hour hospice availability and phone number.    Plan for next visit:  Continued EOL education and support

## 2024-02-22 PROCEDURE — 0651 HSPC ROUTINE HOME CARE

## 2024-02-23 ENCOUNTER — HOME CARE VISIT (OUTPATIENT)
Age: 89
End: 2024-02-23
Payer: MEDICARE

## 2024-02-23 PROCEDURE — 2500000001 HSPC NON INJECTABLE MED

## 2024-02-23 PROCEDURE — G0299 HHS/HOSPICE OF RN EA 15 MIN: HCPCS

## 2024-02-23 PROCEDURE — 0651 HSPC ROUTINE HOME CARE

## 2024-02-24 VITALS
HEART RATE: 75 BPM | OXYGEN SATURATION: 95 % | DIASTOLIC BLOOD PRESSURE: 63 MMHG | RESPIRATION RATE: 20 BRPM | SYSTOLIC BLOOD PRESSURE: 108 MMHG | TEMPERATURE: 97.5 F

## 2024-02-24 PROCEDURE — 0651 HSPC ROUTINE HOME CARE

## 2024-02-24 ASSESSMENT — ENCOUNTER SYMPTOMS: CONTUSION: 1

## 2024-02-24 NOTE — HOSPICE
Patient/Caregiver/Family findings/issues during SN visit:  None identified    Medication refills ordered this visit:   furosemide refilled.  order#[id number]    Medications reconciled and all medications are available in the home this visit.  Education was provided regarding medications, medication interactions, and look alike medications.    Response to teaching.  PCG verbalizes understanding.  Medications are effective at this time.      Supplies by type and quantity ordered this visit include:  None needed    Consulted medical director/attending physician regarding:  Not at this time    Instructed patient/family/caregiver on 24-hour hospice availability and phone number.    Plan for next visit:  Continued EOL education and support

## 2024-02-25 PROCEDURE — 0651 HSPC ROUTINE HOME CARE

## 2024-02-26 PROCEDURE — 0651 HSPC ROUTINE HOME CARE

## 2024-02-27 ENCOUNTER — HOME CARE VISIT (OUTPATIENT)
Age: 89
End: 2024-02-27
Payer: MEDICARE

## 2024-02-27 VITALS
SYSTOLIC BLOOD PRESSURE: 158 MMHG | OXYGEN SATURATION: 95 % | RESPIRATION RATE: 20 BRPM | TEMPERATURE: 97.7 F | HEART RATE: 83 BPM | DIASTOLIC BLOOD PRESSURE: 80 MMHG

## 2024-02-27 PROCEDURE — 2500000001 HSPC NON INJECTABLE MED

## 2024-02-27 PROCEDURE — 0651 HSPC ROUTINE HOME CARE

## 2024-02-27 PROCEDURE — G0299 HHS/HOSPICE OF RN EA 15 MIN: HCPCS

## 2024-02-27 NOTE — HOSPICE
Patient/Caregiver/Family findings/issues during SN visit:  None identified    Medication refills ordered this visit:  metoprolol and diltiazem refilled  order# [id number]    Medications reconciled and all medications are available in the home this visit.  Education was provided regarding medications, medication interactions, and look alike medications.  Response to teaching.  PCG verbalizes understanding.   Medications are effective at this time.      Supplies by type and quantity ordered this visit include:    none needed    Consulted medical director/attending physician regarding:  Not at this time    Instructed patient/family/caregiver on 24-hour hospice availability and phone number.    Plan for next visit:  Continued EOL education and support

## 2024-02-28 PROCEDURE — 0651 HSPC ROUTINE HOME CARE

## 2024-02-29 ENCOUNTER — HOME CARE VISIT (OUTPATIENT)
Age: 89
End: 2024-02-29
Payer: MEDICARE

## 2024-02-29 VITALS
HEART RATE: 110 BPM | DIASTOLIC BLOOD PRESSURE: 84 MMHG | TEMPERATURE: 96.7 F | SYSTOLIC BLOOD PRESSURE: 122 MMHG | OXYGEN SATURATION: 96 % | RESPIRATION RATE: 20 BRPM

## 2024-02-29 PROCEDURE — G0299 HHS/HOSPICE OF RN EA 15 MIN: HCPCS

## 2024-02-29 PROCEDURE — 0651 HSPC ROUTINE HOME CARE

## 2024-02-29 NOTE — HOSPICE
Patient/Caregiver/Family findings/issues during SN visit:  None idenified    Medication refills ordered this visit:   None needed    Medications reconciled and all medications are available in the home this visit.  Education was provided regarding medications, medication interactions, and look alike medications.   Response to teaching.  PCG, Safi verbalzed understanding.   Medications are effective at this time.      Supplies by type and quantity ordered this visit include:  None needed    Consulted medical director/attending physician regarding: Not at this time    Instructed patient/family/caregiver on 24-hour hospice availability and phone number.    Plan for next visit:  Continued monitoring for decline, EOL nd support

## 2024-03-01 PROCEDURE — 0651 HSPC ROUTINE HOME CARE

## 2024-03-02 PROCEDURE — 0651 HSPC ROUTINE HOME CARE

## 2024-03-03 PROCEDURE — 0651 HSPC ROUTINE HOME CARE

## 2024-03-05 ENCOUNTER — HOME CARE VISIT (OUTPATIENT)
Age: 89
End: 2024-03-05
Payer: MEDICARE

## 2024-03-05 VITALS
OXYGEN SATURATION: 97 % | DIASTOLIC BLOOD PRESSURE: 62 MMHG | TEMPERATURE: 96.7 F | HEART RATE: 86 BPM | RESPIRATION RATE: 18 BRPM | SYSTOLIC BLOOD PRESSURE: 122 MMHG

## 2024-03-05 PROCEDURE — G0299 HHS/HOSPICE OF RN EA 15 MIN: HCPCS

## 2024-03-05 NOTE — HOSPICE
Patient/Caregiver/Family findings/issues during SN visit:  None identified    Medication refills ordered this visit: none needed    Medications reconciled and all medications are available in the home this visit.  Education was provided regarding medications, medication interactions, and look alike medications.    Response to teaching.   PCG verbalized understanding.  Medications are effective at this time.  No comfort meds being used.    Supplies by type and quantity ordered this visit include: None needed    Consulted medical director/attending physician regarding: Not at this time    Instructed patient/family/caregiver on 24-hour hospice availability and phone number.    Plan for next visit:  Continue to assess for decline, continue EOL education and provide support

## 2024-03-07 ENCOUNTER — HOME CARE VISIT (OUTPATIENT)
Age: 89
End: 2024-03-07
Payer: MEDICARE

## 2024-03-07 PROCEDURE — G0299 HHS/HOSPICE OF RN EA 15 MIN: HCPCS

## 2024-03-08 VITALS
DIASTOLIC BLOOD PRESSURE: 79 MMHG | HEART RATE: 116 BPM | OXYGEN SATURATION: 96 % | TEMPERATURE: 98 F | SYSTOLIC BLOOD PRESSURE: 126 MMHG | RESPIRATION RATE: 19 BRPM

## 2024-03-08 NOTE — HOSPICE
Patient/Caregiver/Family/Facility findings/issues during SN visit:  n/a    Medication refills ordered this visit:Confirmation #: [id number] lasix    Medications reconciled and all medications are available in the home this visit.  The following education was provided regarding medications, medication interactions, and look alike medications.  Response to teaching: caregiver verbalized understanding. Medications are effective at this time.      Supplies by type and quantity ordered this visit include:Order Number : [id number] med gloves    Consulted medical director/attending physician regarding: n/a    Instructed patient/family/caregiver on 24-hour hospice availability and phone number.    Plan for next visit:  follow up

## 2024-03-12 ENCOUNTER — HOME CARE VISIT (OUTPATIENT)
Age: 89
End: 2024-03-12
Payer: MEDICARE

## 2024-03-12 VITALS
DIASTOLIC BLOOD PRESSURE: 55 MMHG | SYSTOLIC BLOOD PRESSURE: 94 MMHG | HEART RATE: 85 BPM | TEMPERATURE: 98.9 F | RESPIRATION RATE: 20 BRPM | OXYGEN SATURATION: 99 %

## 2024-03-12 PROCEDURE — G0155 HHCP-SVS OF CSW,EA 15 MIN: HCPCS

## 2024-03-12 PROCEDURE — G0299 HHS/HOSPICE OF RN EA 15 MIN: HCPCS

## 2024-03-12 NOTE — HOSPICE
The purpose of today's SW visit was to provide emotional support and assess for any additional needs of pt/CG.  SW was welcomed into the home by CG, Jennifer.  The home is well kept and there are no safety concerns noted during today's SW visit.  Pt was received in the living room of the home.  Pt is dressed appropriately and there are no concerns for abuse/neglect.  SW introduces self to pt.  Pt is extremely [place]. Pt has a pleasant demeanor and is able to answer some questions, if she is able to hear the question.  Pt was able to report that she had just had lunch and CG stated she ate good.  Pt is eating a 1/2 of adult sized portion of foods offered.  Pt is able to state her date of birth and points to the 89 \"year old\" balloon in the living room.  SW questioned if HHA services were still coming out and she stated no.  CG reports she did not do well with hospice aides and preferred that her 24/7 caregivers provide the ADL care.  Pt is able to ambulate with rollater short distances and she denies any falls.  Pt does has not complained of any pain and does not use any comfort medications at this time.  CG states pt seems to be back at her baseline before the hospitalization.  CG denies the need for supplies or medication refills at this time.  No other needs reported.

## 2024-03-14 ENCOUNTER — HOME CARE VISIT (OUTPATIENT)
Age: 89
End: 2024-03-14
Payer: MEDICARE

## 2024-03-14 PROCEDURE — G0299 HHS/HOSPICE OF RN EA 15 MIN: HCPCS

## 2024-03-15 VITALS
RESPIRATION RATE: 16 BRPM | SYSTOLIC BLOOD PRESSURE: 104 MMHG | TEMPERATURE: 97.1 F | OXYGEN SATURATION: 95 % | DIASTOLIC BLOOD PRESSURE: 77 MMHG | HEART RATE: 82 BPM

## 2024-03-15 NOTE — HOSPICE
Patient seated on couch watching tv. She is alert and oriented x3. Vitals WNL. Denies pain and no signs of pain noted. Caregiver states patient just took a shower and patient helped when she could. Patient could not recall what she had for breakfast. Caregiver  states patient had chicken parmesan and ate it all. Caregiver reports a good appetite eating 2 meals per day per her usual. Patient drinks 1 coke a day and drinks 2-3 glasses of water. Patient has regular bowel movements usually daily. She is mostly continent but has incontinent episodes periodically. Caregiver reports no changes since last nurse visit. Instructed to call hospice with any questions or concerns.      Medications: metoprolol and diltiazem reordered via [facility], confirmation number [id number]. Caregiver requested lasix and prilosec be reordered but was unable to do so in [facility] josette, EVELIA Alegria made aware of need to order at next visit    Supplies: chux ordered, medline confirmation number [id number]

## 2024-03-19 ENCOUNTER — HOME CARE VISIT (OUTPATIENT)
Age: 89
End: 2024-03-19
Payer: MEDICARE

## 2024-03-19 VITALS
OXYGEN SATURATION: 95 % | DIASTOLIC BLOOD PRESSURE: 80 MMHG | HEART RATE: 120 BPM | TEMPERATURE: 97.2 F | SYSTOLIC BLOOD PRESSURE: 126 MMHG | RESPIRATION RATE: 20 BRPM

## 2024-03-19 PROCEDURE — G0299 HHS/HOSPICE OF RN EA 15 MIN: HCPCS

## 2024-03-19 NOTE — HOSPICE
Patient/Caregiver/Family findings/issues during SN visit:  None identified    Medication refills ordered this visit: lasix refilled order # [id number]  Safi to let me know about omeprazole as catarino the PCG states she hasn't been taking it.    Medications reconciled and all medications are available in the home this visit.  Education was provided regarding medications, medication interactions, and look alike medications.    Response to teaching.  PCG verbalizes understanding.   Medications are effective at this time.      Supplies by type and quantity ordered this visit include: None needed    Consulted medical director/attending physician regarding: Not at this time    Instructed patient/family/caregiver on 24-hour hospice availability and phone number.    Plan for next visit:  Continued EOL education and support

## 2024-03-21 ENCOUNTER — HOME CARE VISIT (OUTPATIENT)
Age: 89
End: 2024-03-21
Payer: MEDICARE

## 2024-03-21 PROCEDURE — G0299 HHS/HOSPICE OF RN EA 15 MIN: HCPCS

## 2024-03-22 VITALS
SYSTOLIC BLOOD PRESSURE: 102 MMHG | TEMPERATURE: 97.2 F | DIASTOLIC BLOOD PRESSURE: 76 MMHG | OXYGEN SATURATION: 99 % | RESPIRATION RATE: 18 BRPM | HEART RATE: 83 BPM

## 2024-03-22 NOTE — HOSPICE
Patient/Caregiver/Family findings/issues during SN visit:  None identified    Medication refills ordered this visit:   [employer]VORT[employer]se refilled order# [id number]    Medications reconciled and all medications are available in the home this visit.  Education was provided regarding medications, medication interactions, and look alike medications.   Response to teaching.  PCG verbalized understanding.   Medications are effective at this time.      Supplies by type and quantity ordered this visit include: None needed    Consulted medical director/attending physician regarding: Not at this time    Instructed patient/family/caregiver on 24-hour hospice availability and phone number.    Plan for next visit:  Continue to monitor for decline.

## 2024-03-26 ENCOUNTER — HOME CARE VISIT (OUTPATIENT)
Age: 89
End: 2024-03-26
Payer: MEDICARE

## 2024-03-26 VITALS
HEART RATE: 82 BPM | SYSTOLIC BLOOD PRESSURE: 126 MMHG | DIASTOLIC BLOOD PRESSURE: 66 MMHG | OXYGEN SATURATION: 97 % | TEMPERATURE: 96.6 F | RESPIRATION RATE: 20 BRPM

## 2024-03-26 PROCEDURE — G0299 HHS/HOSPICE OF RN EA 15 MIN: HCPCS

## 2024-03-26 NOTE — HOSPICE
Patient/Caregiver/Family findings/issues during SN visit:  None identified    Medication refills ordered this visit: None needed    Medications reconciled and all medications are available in the home this visit.  Education was provided regarding medications, medication interactions, and look alike medications.    Response to teaching.  Paid help verbalized understanding.   Medications are effective at this time.      Supplies by type and quantity ordered this visit include: None needed    Consulted medical director/attending physician regarding: Not at this time    Instructed patient/family/caregiver on 24-hour hospice availability and phone number.    Plan for next visit:  Continued EOL education and support

## 2024-03-26 NOTE — HOSPICE
Patient was up and sitting in her car. She was able to talk and only answered direct questions with simple answers.  will continue to follow. 2.04

## 2024-03-28 ENCOUNTER — HOME CARE VISIT (OUTPATIENT)
Age: 89
End: 2024-03-28
Payer: MEDICARE

## 2024-03-28 VITALS
DIASTOLIC BLOOD PRESSURE: 78 MMHG | TEMPERATURE: 98.4 F | HEART RATE: 88 BPM | OXYGEN SATURATION: 95 % | SYSTOLIC BLOOD PRESSURE: 118 MMHG | RESPIRATION RATE: 20 BRPM

## 2024-03-28 PROCEDURE — G0299 HHS/HOSPICE OF RN EA 15 MIN: HCPCS

## 2024-03-28 NOTE — HOSPICE
Patient/Caregiver/Family findings/issues during SN visit:  Patient/PCG complaining of drippy nose.  Drainage is clear.  The patient had been on Claritin prior to Hospice.  Claritin 10mg daily ordered    Medication refills ordered this visit: Claritin ordered via [facility] # [id number]    Medications reconciled and all medications are available in the home this visit.  Education was provided regarding medications, medication interactions, and look alike medications.   Response to teaching.  PCG verbalized understanding.   Medications are effective at this time.      Supplies by type and quantity ordered this visit include: large gloves and wipes ordered.    Consulted medical director/attending physician regarding: Mell Simms ordered claritin    Instructed patient/family/caregiver on 24-hour hospice availability and phone number.    Plan for next visit:  Continue to monitor for decline, EOL education and support

## 2024-04-01 ASSESSMENT — ENCOUNTER SYMPTOMS: PAIN LOCATION - PAIN QUALITY: ACHING

## 2024-04-01 NOTE — HOSPICE
Caregiver findings/issues during SN visit:  none    Medication refills ordered this visit: none requested    Medications reconciled and all medications are available in the home this visit. Medications are effective at this time.      Supplies by type and quantity ordered this visit include: nichelle requested    Instructed caregiver on 24-hour hospice availability and phone number.    Plan for next visit:  EOL education and caregiver support

## 2024-04-02 ENCOUNTER — HOME CARE VISIT (OUTPATIENT)
Age: 89
End: 2024-04-02
Payer: MEDICARE

## 2024-04-02 PROCEDURE — G0299 HHS/HOSPICE OF RN EA 15 MIN: HCPCS

## 2024-04-03 VITALS
TEMPERATURE: 97.7 F | HEART RATE: 99 BPM | RESPIRATION RATE: 18 BRPM | DIASTOLIC BLOOD PRESSURE: 93 MMHG | SYSTOLIC BLOOD PRESSURE: 135 MMHG | OXYGEN SATURATION: 97 %

## 2024-04-03 NOTE — HOSPICE
Patient/Caregiver/Family findings/issues during SN visit:   none identified    Medication refills ordered this visit:   lasix, metoprolol, omeprazole and cardiazem refilled order # [id number]    Medications reconciled and all medications are available in the home this visit.  Education was provided regarding medications, medication interactions, and look alike medications.  Response to teaching  PCG verbalize understanding.  Medications are effective at this time.      Supplies by type and quantity ordered this visit include:  none needed    Consulted medical director/attending physician regarding:  Not at this time    Instructed patient/family/caregiver on 24-hour hospice availability and phone number.    Plan for next visit:   Patient contonues with no decline throughout this certification.  Discuss possible dscharge from services in [facility]

## 2024-04-04 ENCOUNTER — HOME CARE VISIT (OUTPATIENT)
Age: 89
End: 2024-04-04
Payer: MEDICARE

## 2024-04-04 VITALS
SYSTOLIC BLOOD PRESSURE: 136 MMHG | DIASTOLIC BLOOD PRESSURE: 70 MMHG | OXYGEN SATURATION: 94 % | HEART RATE: 100 BPM | TEMPERATURE: 98.5 F | RESPIRATION RATE: 18 BRPM

## 2024-04-04 PROCEDURE — G0299 HHS/HOSPICE OF RN EA 15 MIN: HCPCS

## 2024-04-04 NOTE — HOSPICE
Patient/Caregiver/Family findings/issues during SN visit:  None identified    Medication refills ordered this visit:   none needed    Medications reconciled and all medications are available in the home this visit.  Education was provided regarding medications, medication interactions, and look alike medications.    Response to teaching.  PCG verbalized understanding.   Medications are effective at this time.      Supplies by type and quantity ordered this visit include: chux and wipes ordered order#[id number]    Consulted medical director/attending physician regarding: Not at this time    Instructed patient/family/caregiver on 24-hour hospice availability and phone number.    Plan for next visit:  Spoke with PCG about possible discharge if no decline continues.  Left eye red.  PCG to make an appointment with eye Dr. as patient has a hisotry of a new lens in that eye.

## 2024-04-09 ENCOUNTER — HOME CARE VISIT (OUTPATIENT)
Age: 89
End: 2024-04-09
Payer: MEDICARE

## 2024-04-09 PROCEDURE — G0299 HHS/HOSPICE OF RN EA 15 MIN: HCPCS

## 2024-04-10 ENCOUNTER — HOME CARE VISIT (OUTPATIENT)
Age: 89
End: 2024-04-10
Payer: MEDICARE

## 2024-04-10 VITALS
DIASTOLIC BLOOD PRESSURE: 60 MMHG | RESPIRATION RATE: 18 BRPM | TEMPERATURE: 97.2 F | SYSTOLIC BLOOD PRESSURE: 114 MMHG | OXYGEN SATURATION: 95 % | HEART RATE: 80 BPM

## 2024-04-10 PROCEDURE — G0155 HHCP-SVS OF CSW,EA 15 MIN: HCPCS

## 2024-04-10 PROCEDURE — G0299 HHS/HOSPICE OF RN EA 15 MIN: HCPCS

## 2024-04-10 NOTE — HOSPICE
Patient/Caregiver/Family findings/issues during SN visit:  None identified    Medication refills ordered this visit: None needed    Medications reconciled and all medications are available in the home this visit.  Education was provided regarding medications, medication interactions, and look alike medications.   Response to teaching.  PCG verbalized understanding.  Medications are effective at this time.      Supplies by type and quantity ordered this visit include: None needed    Consulted medical director/attending physician regarding: Not at this time    Instructed patient/family/caregiver on 24-hour hospice availability and phone number.    Plan for next visit:  Continue to monitor for decline.  Discharge if appropriate

## 2024-04-11 ENCOUNTER — HOME CARE VISIT (OUTPATIENT)
Age: 89
End: 2024-04-11
Payer: MEDICARE

## 2024-04-11 VITALS
TEMPERATURE: 98.6 F | OXYGEN SATURATION: 99 % | SYSTOLIC BLOOD PRESSURE: 117 MMHG | DIASTOLIC BLOOD PRESSURE: 83 MMHG | RESPIRATION RATE: 20 BRPM | HEART RATE: 76 BPM

## 2024-04-11 PROCEDURE — G0299 HHS/HOSPICE OF RN EA 15 MIN: HCPCS

## 2024-04-11 NOTE — HOSPICE
Patient/Caregiver/Family findings/issues during SN visit:   Called by PCG because patient had a coughing spell and became SOB    Medication refills ordered this visit: None needed    Medications reconciled and all medications are available in the home this visit.  Education was provided regarding medications, medication interactions, and look alike medications.   Response to teaching.  PCG verbalizes understanding.   Medications are effective at this time.      Supplies by type and quantity ordered this visit include:   None needed    Consulted medical director/attending physician regarding:  Not at this time    Instructed patient/family/caregiver on 24-hour hospice availability and phone number.    Plan for next visit:  Patient was back to baseline when I arrived.  Oxygen on and sats 99%.  Cough sbsided.  PCG stated it was clear white secretions that was expectorated and lungs were clear.  Educated on use of morphine for SOB/PAIN

## 2024-04-12 VITALS
TEMPERATURE: 98.6 F | OXYGEN SATURATION: 96 % | HEART RATE: 98 BPM | DIASTOLIC BLOOD PRESSURE: 60 MMHG | SYSTOLIC BLOOD PRESSURE: 120 MMHG | RESPIRATION RATE: 18 BRPM

## 2024-04-12 NOTE — HOSPICE
Patient/Caregiver/Family findings/issues during SN visit:  none identified    Medication refills ordered this visit: claritin refilled order# [id number]/ Diltiazem, furosemide, metoprolol and prilosec refilled 4/12  order# [id number]    Medications reconciled and all medications are available in the home this visit.  Education was provided regarding medications, medication interactions, and look alike medications.  Response to teaching.  PCG;s verblized understanding.  Currently is not using ny comfort medications.        Supplies by type and quantity ordered this visit include: none needed    Consulted medical director/attending physician regarding:  Not at this time    Instructed patient/family/caregiver on 24-hour hospice availability and phone number.    Plan for next visit:  Continue to monitor for decline

## 2024-04-16 ENCOUNTER — HOME CARE VISIT (OUTPATIENT)
Age: 89
End: 2024-04-16
Payer: MEDICARE

## 2024-04-16 PROCEDURE — G0299 HHS/HOSPICE OF RN EA 15 MIN: HCPCS

## 2024-04-17 ENCOUNTER — HOME CARE VISIT (OUTPATIENT)
Age: 89
End: 2024-04-17
Payer: MEDICARE

## 2024-04-17 VITALS
OXYGEN SATURATION: 97 % | SYSTOLIC BLOOD PRESSURE: 128 MMHG | HEART RATE: 111 BPM | RESPIRATION RATE: 20 BRPM | DIASTOLIC BLOOD PRESSURE: 70 MMHG | TEMPERATURE: 97.5 F

## 2024-04-17 PROCEDURE — G0155 HHCP-SVS OF CSW,EA 15 MIN: HCPCS

## 2024-04-17 NOTE — HOSPICE
Patient/Caregiver/Family findings/issues during SN visit:  none identified    Medication refills ordered this visit: Increased ose of lasix and kcl ordered  order# [id number]  KCL [facility] locally. [facility]    Medications reconciled and all medications are available in the home this visit.  Education was provided regarding medications, medication interactions, and look alike medications.   Response to teaching.  PCG verbalized understanding.   Medications are effective at this time.      Supplies by type and quantity ordered this visit include:  None needed    Consulted medical director/attending physician regarding:   Spoke with ERIC Leonard and meds ordered    Instructed patient/family/caregiver on 24-hour hospice availability and phone number.    Plan for next visit:  Continued EOL education and support.  Assess the effectiveness or new medications.

## 2024-04-18 ENCOUNTER — HOME CARE VISIT (OUTPATIENT)
Age: 89
End: 2024-04-18
Payer: MEDICARE

## 2024-04-18 VITALS
OXYGEN SATURATION: 91 % | RESPIRATION RATE: 22 BRPM | HEART RATE: 118 BPM | DIASTOLIC BLOOD PRESSURE: 69 MMHG | SYSTOLIC BLOOD PRESSURE: 109 MMHG | TEMPERATURE: 97.3 F

## 2024-04-18 PROCEDURE — G0299 HHS/HOSPICE OF RN EA 15 MIN: HCPCS

## 2024-04-18 ASSESSMENT — ENCOUNTER SYMPTOMS
COUGH: 1
COUGH CHARACTERISTICS: DRY

## 2024-04-18 NOTE — HOSPICE
Patient/Caregiver/Family findings/issues during SN visit:  None identified    Medication refills ordered this visit:  none needed    Medications reconciled and all medications are available in the home this visit.  Education was provided regarding medications, medication interactions, and look alike medications.  Response to teaching.  PCG verbalized understanding on using morphine for pain and SOB.   No comfort medications in use at this time.      Supplies by type and quantity ordered this visit include: chux, wipes and incontience liners order# [id number]    Consulted medical director/attending physician regarding: Not at this time    Instructed patient/family/caregiver on 24-hour hospice availability and phone number.    Plan for next visit: Continued education on medications and when to use.

## 2024-04-20 ENCOUNTER — HOME CARE VISIT (OUTPATIENT)
Age: 89
End: 2024-04-20
Payer: MEDICARE

## 2024-04-22 ENCOUNTER — HOME CARE VISIT (OUTPATIENT)
Age: 89
End: 2024-04-22
Payer: MEDICARE

## 2024-04-23 ENCOUNTER — HOME CARE VISIT (OUTPATIENT)
Age: 89
End: 2024-04-23
Payer: MEDICARE

## 2024-04-23 PROCEDURE — G0299 HHS/HOSPICE OF RN EA 15 MIN: HCPCS

## 2024-04-23 NOTE — HOSPICE
Patient was in bed a sleep upon my arrival. Patient was not the same today. Patient was not talkative today. CHplain will continue to follow.

## 2024-04-24 VITALS
OXYGEN SATURATION: 100 % | SYSTOLIC BLOOD PRESSURE: 111 MMHG | HEART RATE: 92 BPM | DIASTOLIC BLOOD PRESSURE: 64 MMHG | RESPIRATION RATE: 15 BRPM | TEMPERATURE: 97.1 F

## 2024-04-24 NOTE — HOSPICE
Hospice Re-Certification Summary    Patient Name:  Kamilah Villegas  Hospice Diagnosis:  senile degeneration of brain and CHF  Related Diagnoses: debility, malnutrition, CKD, A-Fib, CAD, and history of subdural hematoma  Hospice Benefit Period: 2nd  Face-to-Face visit required (if going into 3rd benefit period or greater): No    History and Progression of the Illness(es):  Mrs.Millicent Nelly (Anne), 89 year old female, admitted to Hospice services for a terminal diagnosis of senile degeneration of brain and CHF. Co-morbidities related to the terminal diagnosis are debility, malnutrition, CKD, A-Fib, CAD, and history of subdural hematoma. Patient also has a past medical history of GERD and hyperlipidemia.     Initial assessment onto hospice services. Do not read this section in [facility]. This is a look back for MD & NP to read and gather further info on pt, and to use in the recertification documentation.      Clinical Summary (including changes since hospice admission and/or re-certification):    On Admission:   Patient was living in her home with 24-hour paid caregivers. Per the caregiver, patient was able to ambulate independently (sometimes using a walker), she was continent and able to ambulate to the bathroom alone. She was able to feed herself. She did require maximum assistance with showering/bathing, cooking, cleaning and laundry. She has a trust agent that has been overseeing her finances and bill payment. She was alert and oriented to person, place, and situation. Per caregiver, the patient became very short of breath several days ago and that is why she was hospitalized. She was found to have A-fib causing her weakness and shortness of breath.    Six months ago: (if needed)     Now:  Patient is alert and oriented to self and surrounds. She is having a hard time recognizing caregivers that have been caring for her for 3 years. Patient appears thin in appearance. She is now chair/bed bound, is recently

## 2024-04-25 ENCOUNTER — HOME CARE VISIT (OUTPATIENT)
Age: 89
End: 2024-04-25
Payer: MEDICARE

## 2024-04-25 VITALS
HEART RATE: 91 BPM | OXYGEN SATURATION: 96 % | DIASTOLIC BLOOD PRESSURE: 52 MMHG | RESPIRATION RATE: 16 BRPM | TEMPERATURE: 97.1 F | SYSTOLIC BLOOD PRESSURE: 139 MMHG

## 2024-04-25 PROCEDURE — G0299 HHS/HOSPICE OF RN EA 15 MIN: HCPCS

## 2024-04-25 NOTE — HOSPICE
Patient is alert and oriented to self and caregiver. She is pleasantly confused. Vitals WNL. Denies pain and no signs of pain noted. O2 not in use during visit, o2 sat in 90s. Patient has not been out of bed since this weekend even with sliding board. Patient is still getting use to the concept of using the briefs for elimination. Patient has had a poor appetite today. Caregiver states she is sleeping more and more. Instructed to call hospice with any questions or concerns

## 2024-04-26 ENCOUNTER — HOME CARE VISIT (OUTPATIENT)
Age: 89
End: 2024-04-26
Payer: MEDICARE

## 2024-04-30 ENCOUNTER — HOME CARE VISIT (OUTPATIENT)
Age: 89
End: 2024-04-30
Payer: MEDICARE

## 2024-04-30 VITALS
RESPIRATION RATE: 15 BRPM | OXYGEN SATURATION: 98 % | SYSTOLIC BLOOD PRESSURE: 125 MMHG | TEMPERATURE: 97.1 F | HEART RATE: 82 BPM | DIASTOLIC BLOOD PRESSURE: 64 MMHG

## 2024-04-30 PROCEDURE — G0299 HHS/HOSPICE OF RN EA 15 MIN: HCPCS

## 2024-04-30 NOTE — HOSPICE
Patient lying in bed watching tv. Patient is alert, oriented only to self. Believes she is in the hospital. Caregiver reports patient has been more confused trying to get out of bed during the day and night but mostly at night. They have given a few doses of lorazepam which has been effective. Caregiver reports patient is now only eating soft foods and drinking ensure. Vitals WNL. Denies pain and no signs of pain noted. Caregiver requesting HHA visits to resume. Will make EVELIA Rowley aware. Instructed to call hospice with any questions or concerns.      Medications: none needed at this time      Supplies: none needed at this time

## 2024-05-02 ENCOUNTER — HOME CARE VISIT (OUTPATIENT)
Age: 89
End: 2024-05-02
Payer: MEDICARE

## 2024-05-02 VITALS
TEMPERATURE: 97.1 F | HEART RATE: 85 BPM | OXYGEN SATURATION: 100 % | RESPIRATION RATE: 15 BRPM | DIASTOLIC BLOOD PRESSURE: 60 MMHG | SYSTOLIC BLOOD PRESSURE: 102 MMHG

## 2024-05-02 PROCEDURE — G0299 HHS/HOSPICE OF RN EA 15 MIN: HCPCS

## 2024-05-02 NOTE — HOSPICE
Patient having increased SOB requiring more frequent oxygen use during the day. Patient has been trying to get out of the bed so lorazepam is being used more frequently as well. Patients appetite is decreasing but does drink 2-3 ensures per day. Patient is having regular BMs. Patients skin is intact, no edema. Patient denies pain and no signs of pain noted. Vitals WNL. Instructed to call hospice with any questions or concerns      Medications: none needed at this time      Supplies: gloves ordered via medline, confirmation number [id number]

## 2024-05-03 ENCOUNTER — HOME CARE VISIT (OUTPATIENT)
Age: 89
End: 2024-05-03
Payer: MEDICARE

## 2024-05-03 PROCEDURE — G0156 HHCP-SVS OF AIDE,EA 15 MIN: HCPCS

## 2024-05-06 ENCOUNTER — HOME CARE VISIT (OUTPATIENT)
Age: 89
End: 2024-05-06
Payer: MEDICARE

## 2024-05-06 PROCEDURE — G0299 HHS/HOSPICE OF RN EA 15 MIN: HCPCS

## 2024-05-07 VITALS
OXYGEN SATURATION: 97 % | DIASTOLIC BLOOD PRESSURE: 50 MMHG | SYSTOLIC BLOOD PRESSURE: 149 MMHG | HEART RATE: 88 BPM | RESPIRATION RATE: 22 BRPM | TEMPERATURE: 97.1 F

## 2024-05-07 ASSESSMENT — ENCOUNTER SYMPTOMS
CONTUSION: 1
BOWEL INCONTINENCE: 1

## 2024-05-07 NOTE — HOSPICE
Patient/Caregiver/Family findings/issues during SN visit:  None needed    Medication refills ordered this visit: cardiazem, metoprolol, claritin and omeprazole refilled order#[id number]    Medications reconciled and all medications are available in the home this visit.  Education was provided regarding medications, medication interactions, and look alike medications.   Response to teaching.  El the PCG verbalized understanding.  I also wrote down instructions for other PCG's not present.  Medications are effective at this time.  Lasix decreased to 20mg daily.    Supplies by type and quantity ordered this visit include: Large briefs, chux ordered  order#[id number]    Consulted medical director/attending physician regarding:  Not at this time    Instructed patient/family/caregiver on 24-hour hospice availability and phone number.    Plan for next visit:  Continued EOL education and support

## 2024-05-09 ENCOUNTER — HOME CARE VISIT (OUTPATIENT)
Age: 89
End: 2024-05-09
Payer: MEDICARE

## 2024-05-09 VITALS
RESPIRATION RATE: 20 BRPM | SYSTOLIC BLOOD PRESSURE: 108 MMHG | HEART RATE: 110 BPM | DIASTOLIC BLOOD PRESSURE: 78 MMHG | OXYGEN SATURATION: 95 % | TEMPERATURE: 98.1 F

## 2024-05-09 PROCEDURE — G0299 HHS/HOSPICE OF RN EA 15 MIN: HCPCS

## 2024-05-09 ASSESSMENT — ENCOUNTER SYMPTOMS
CONTUSION: 1
BOWEL INCONTINENCE: 1

## 2024-05-09 NOTE — HOSPICE
Patient/Caregiver/Family findings/issues during SN visit:  None identified    Medication refills ordered this visit:  None needed    Medications reconciled and all medications are available in the home this visit.  Education was provided regarding medications, medication interactions, and look alike medications.    Response to teaching.  El, PCG verbalized understanding.   Medications are effective at this time.      Supplies by type and quantity ordered this visit include: None needed    Consulted medical director/attending physician regarding: Not at this time    Instructed patient/family/caregiver on 24-hour hospice availability and phone number.    Plan for next visit:  Continued EOL education and support.  JENNIFER Payne called and updated on patient status.  All questions answered.

## 2024-05-10 ENCOUNTER — HOME CARE VISIT (OUTPATIENT)
Age: 89
End: 2024-05-10
Payer: MEDICARE

## 2024-05-10 PROCEDURE — G0156 HHCP-SVS OF AIDE,EA 15 MIN: HCPCS

## 2024-05-14 ENCOUNTER — HOME CARE VISIT (OUTPATIENT)
Age: 89
End: 2024-05-14
Payer: MEDICARE

## 2024-05-14 VITALS
HEART RATE: 113 BPM | TEMPERATURE: 97.2 F | RESPIRATION RATE: 22 BRPM | SYSTOLIC BLOOD PRESSURE: 108 MMHG | OXYGEN SATURATION: 91 % | DIASTOLIC BLOOD PRESSURE: 68 MMHG

## 2024-05-14 PROCEDURE — G0156 HHCP-SVS OF AIDE,EA 15 MIN: HCPCS

## 2024-05-14 PROCEDURE — G0299 HHS/HOSPICE OF RN EA 15 MIN: HCPCS

## 2024-05-14 ASSESSMENT — ENCOUNTER SYMPTOMS
BOWEL INCONTINENCE: 1
CONTUSION: 1

## 2024-05-14 NOTE — HOSPICE
Patient/Caregiver/Family findings/issues during SN visit:  None identified    Medication refills ordered this visit: None needed    Medications reconciled and all medications are available in the home this visit.  Education was provided regarding medications, medication interactions, and look alike medications.    Response to teaching.  PCG verbalizes understanding.  Medications are effective at this time.      Supplies by type and quantity ordered this visit include: None needed    Consulted medical director/attending physician regarding: Not at this visit    Instructed patient/family/caregiver on 24-hour hospice availability and phone number.    Plan for next visit:  Continued EOL education and support

## 2024-05-16 ENCOUNTER — HOME CARE VISIT (OUTPATIENT)
Age: 89
End: 2024-05-16
Payer: MEDICARE

## 2024-05-16 VITALS
RESPIRATION RATE: 22 BRPM | HEART RATE: 128 BPM | DIASTOLIC BLOOD PRESSURE: 70 MMHG | SYSTOLIC BLOOD PRESSURE: 128 MMHG | OXYGEN SATURATION: 95 % | TEMPERATURE: 97.4 F

## 2024-05-16 PROCEDURE — G0299 HHS/HOSPICE OF RN EA 15 MIN: HCPCS

## 2024-05-16 ASSESSMENT — ENCOUNTER SYMPTOMS
BOWEL INCONTINENCE: 1
CONTUSION: 1

## 2024-05-16 NOTE — HOSPICE
Patient/Caregiver/Family findings/issues during SN visit:  None identified    Medication refills ordered this visit: Diltiazem, claritin, metoprolol and omeprazole refilled order#[id number]    Medications reconciled and all medications are available in the home this visit.  Education was provided regarding medications, medication interactions, and look alike medications.  Response to teaching.  PCG, Safi verbalized understanding.  Medications are effective at this time.      Supplies by type and quantity ordered this visit include: medium gloves, large briefs, wipes order#[id number]    Consulted medical director/attending physician regarding: Not at this time    Instructed patient/family/caregiver on 24-hour hospice availability and phone number.    Plan for next visit:  Continued EOL education and support

## 2024-05-17 ENCOUNTER — HOME CARE VISIT (OUTPATIENT)
Age: 89
End: 2024-05-17
Payer: MEDICARE

## 2024-05-17 PROCEDURE — G0156 HHCP-SVS OF AIDE,EA 15 MIN: HCPCS

## 2024-05-21 ENCOUNTER — HOME CARE VISIT (OUTPATIENT)
Age: 89
End: 2024-05-21
Payer: MEDICARE

## 2024-05-21 VITALS
HEART RATE: 98 BPM | SYSTOLIC BLOOD PRESSURE: 104 MMHG | RESPIRATION RATE: 22 BRPM | TEMPERATURE: 97.2 F | DIASTOLIC BLOOD PRESSURE: 68 MMHG | OXYGEN SATURATION: 99 %

## 2024-05-21 PROCEDURE — G0299 HHS/HOSPICE OF RN EA 15 MIN: HCPCS

## 2024-05-21 PROCEDURE — G0156 HHCP-SVS OF AIDE,EA 15 MIN: HCPCS

## 2024-05-21 ASSESSMENT — ENCOUNTER SYMPTOMS
CONTUSION: 1
BOWEL INCONTINENCE: 1
COUGH CHARACTERISTICS: DRY
COUGH: 1

## 2024-05-21 NOTE — HOSPICE
Patient/Caregiver/Family/Facility findings/issues during SN visit:  None identified    Medication refills ordered this visit:  lasix refilled#[id number]    Medications reconciled and all medications are available in the home this visit.  Education was provided regarding medications, medication interactions, and look alike medications.    Response to teaching.  PCG verbalized understanding.   Medications are effective at this time.      Supplies by type and quantity ordered this visit include: chux, wipes, barrier and zinc ordered# [id number]    Consulted medical director/attending physician regarding: Not at this time    Instructed patient/family/caregiver on 24-hour hospice availability and phone number.    Plan for next visit:  Continued EOL education and support

## 2024-05-23 ENCOUNTER — HOME CARE VISIT (OUTPATIENT)
Age: 89
End: 2024-05-23
Payer: MEDICARE

## 2024-05-23 PROCEDURE — G0155 HHCP-SVS OF CSW,EA 15 MIN: HCPCS

## 2024-05-24 ENCOUNTER — HOME CARE VISIT (OUTPATIENT)
Age: 89
End: 2024-05-24
Payer: MEDICARE

## 2024-05-24 VITALS — TEMPERATURE: 97.1 F | HEART RATE: 91 BPM | RESPIRATION RATE: 20 BRPM

## 2024-05-24 PROCEDURE — G0299 HHS/HOSPICE OF RN EA 15 MIN: HCPCS

## 2024-05-24 PROCEDURE — G0156 HHCP-SVS OF AIDE,EA 15 MIN: HCPCS

## 2024-05-24 NOTE — HOSPICE
Patient/Caregiver/Family/Facility findings/issues during SN visit:  heavenly BP- D/c metoprolol and lasix    Medication refills ordered this visit: none needed    Medications reconciled and all medications are available in the home this visit.  The following education was provided regarding medications, medication interactions, and look alike medications.  Response to teaching: caregiver verbalized understanding. Medications are effective at this time.      Supplies by type and quantity ordered this visit include: none needed    Consulted medical director/attending physician regarding: n/a    Instructed patient/family/caregiver on 24-hour hospice availability and phone number.    Plan for next visit:  follow up

## 2024-05-28 ENCOUNTER — HOME CARE VISIT (OUTPATIENT)
Age: 89
End: 2024-05-28
Payer: MEDICARE

## 2024-05-28 PROCEDURE — G0156 HHCP-SVS OF AIDE,EA 15 MIN: HCPCS

## 2024-05-28 NOTE — HOSPICE
Today's SW was conducted to assess pt's well-being, offer emotional support and assess for needs.  SW was welcomed into the home by paid CG, El.  The home is clean and tidy and there are no visible safety concerns noted by SW during this visit.  Pt is received in her room. lying comfortably in her hospital bed with eyes closed.  Pt rouses easily and SW introduces self to pt.  CG reports that pt has \"bounced back\" since decline earlier in the month.  CG reports weekend CG's do not feed pt if she refuses.  CG reports that pt will eat if food is brought to her, but will decline if just asked.  CG does request to know when HHA and RN will arrive.  SW reports on RN absence today and visit has been changed until tomorrow.  CG states understanding.  Pt is mostly bedbound at this time and HHA visits have been added back into pt's care plan due to decline.  Pt is dressed appropriately and there are no visible signs of abuse/neglect.  Pt is pleasant and makes small talk with SW.  Pt is slightly confused, having difficulty with short term memory.  No other needs reported at this time.

## 2024-05-29 ENCOUNTER — HOME CARE VISIT (OUTPATIENT)
Age: 89
End: 2024-05-29
Payer: MEDICARE

## 2024-05-29 VITALS
DIASTOLIC BLOOD PRESSURE: 65 MMHG | TEMPERATURE: 98.5 F | RESPIRATION RATE: 16 BRPM | OXYGEN SATURATION: 100 % | SYSTOLIC BLOOD PRESSURE: 110 MMHG | HEART RATE: 100 BPM

## 2024-05-29 PROCEDURE — G0299 HHS/HOSPICE OF RN EA 15 MIN: HCPCS

## 2024-05-29 NOTE — HOSPICE
Patient/Caregiver/Family/Facility findings/issues during SN visit:  n/a    Medication refills ordered this visit: n/a    Medications reconciled and all medications are available in the home this visit.  The following education was provided regarding medications, medication interactions, and look alike medications.  Response to teaching: caregiver verbalizes understanding. Medications are effective at this time.      Supplies by type and quantity ordered this visit include: n/a    Consulted medical director/attending physician regarding: n/a    Instructed patient/family/caregiver on 24-hour hospice availability and phone number.    Plan for next visit:  follow up    pt has stage 1 Pressure injuries on bliteral heels. foam dressing applied

## 2024-05-30 ENCOUNTER — HOME CARE VISIT (OUTPATIENT)
Age: 89
End: 2024-05-30
Payer: MEDICARE

## 2024-05-30 PROCEDURE — G0300 HHS/HOSPICE OF LPN EA 15 MIN: HCPCS

## 2024-05-31 ENCOUNTER — HOME CARE VISIT (OUTPATIENT)
Age: 89
End: 2024-05-31
Payer: MEDICARE

## 2024-05-31 VITALS
HEART RATE: 70 BPM | DIASTOLIC BLOOD PRESSURE: 64 MMHG | TEMPERATURE: 98.1 F | SYSTOLIC BLOOD PRESSURE: 112 MMHG | OXYGEN SATURATION: 100 % | RESPIRATION RATE: 16 BRPM

## 2024-05-31 PROCEDURE — G0156 HHCP-SVS OF AIDE,EA 15 MIN: HCPCS

## 2024-05-31 NOTE — HOSPICE
Patient/Caregiver/Family/Facility findings/issues during SN visit:  No new concerns per CG.  Pt denies pain.  Bm today.  Pt eats 2-3 bites of meals offered.  Sleeping through the night and on and off throughout the day.      Medication refills ordered this visit:  Not needed this visit    Medications reconciled and all medications are available in the home this visit.  The following education was provided regarding medications, medication interactions, and look alike medications: Medication side effects, dosages, purposes, frequencies.  Response to teaching: Verbalized understanding. Medications are effective at this time.      Supplies by type and quantity ordered this visit include: Large briefs, wipes, medium gloves, liners 2 Order Number : [id number]    Consulted medical director/attending physician regarding: Not needed    Instructed patient/family/caregiver on 24-hour hospice availability and phone number.    Plan for next visit:  Continue end of life education and support caregiver.

## 2024-05-31 NOTE — HOSPICE
Patient was in bed with aide at the bed side. She stayed up and said hello but she no longer knows who I am.  will continue to follow.

## 2024-06-04 ENCOUNTER — HOME CARE VISIT (OUTPATIENT)
Age: 89
End: 2024-06-04
Payer: MEDICARE

## 2024-06-04 VITALS
HEART RATE: 114 BPM | DIASTOLIC BLOOD PRESSURE: 63 MMHG | RESPIRATION RATE: 16 BRPM | SYSTOLIC BLOOD PRESSURE: 124 MMHG | TEMPERATURE: 97.5 F | OXYGEN SATURATION: 94 %

## 2024-06-04 PROCEDURE — G0299 HHS/HOSPICE OF RN EA 15 MIN: HCPCS

## 2024-06-04 PROCEDURE — G0156 HHCP-SVS OF AIDE,EA 15 MIN: HCPCS

## 2024-06-04 ASSESSMENT — ENCOUNTER SYMPTOMS: BOWEL INCONTINENCE: 1

## 2024-06-04 NOTE — HOSPICE
Patient lying in bed watching tv. Patient is alert, oriented only to self and caregiver. Vitals WNL. Denies pain and no signs of pain noted. Oxygen via nc tolerated well, caregiver states she is now wearing oxygen most of the time. Patient has no edema. Patient is eating 1-2 small meals a day and drinking at least 1 ensure per day. Patient is having daily bowel movements. Caregiver reports needing refills on medications. Instructed to call hospice with any questions or concerns.      Medications: omeprazole and ditalazem ordered for delivery via [employer], conf #[id number]      Supplies: bladder pads and barrier cream ordered via [employer], conf # [id number]

## 2024-06-06 ENCOUNTER — HOME CARE VISIT (OUTPATIENT)
Age: 89
End: 2024-06-06
Payer: MEDICARE

## 2024-06-06 PROCEDURE — G0299 HHS/HOSPICE OF RN EA 15 MIN: HCPCS

## 2024-06-07 ENCOUNTER — HOME CARE VISIT (OUTPATIENT)
Age: 89
End: 2024-06-07
Payer: MEDICARE

## 2024-06-07 VITALS
TEMPERATURE: 97.2 F | SYSTOLIC BLOOD PRESSURE: 111 MMHG | OXYGEN SATURATION: 92 % | RESPIRATION RATE: 22 BRPM | DIASTOLIC BLOOD PRESSURE: 68 MMHG | HEART RATE: 118 BPM

## 2024-06-07 PROCEDURE — G0156 HHCP-SVS OF AIDE,EA 15 MIN: HCPCS

## 2024-06-07 ASSESSMENT — ENCOUNTER SYMPTOMS
COUGH CHARACTERISTICS: NON-PRODUCTIVE
COUGH: 1
BOWEL INCONTINENCE: 1
CONTUSION: 1

## 2024-06-07 NOTE — HOSPICE
Patient/Caregiver/Family findings/issues during SN visit:  none identified    Medication refills ordered this visit:   refills ordered last visit.  too soon today    Medications reconciled and all medications are available in the home this visit.  Education was provided regarding medications, medication interactions, and look alike medications.    Response to teaching.  Safi, PCG verbalizes understanding.   Medications are effective at this time.      Supplies by type and quantity ordered this visit include:  medium gloves, large briefs, contour liners, wipes, chux nd brrier cream  #[id number]    Consulted medical director/attending physician regarding:   Not at this time    Instructed patient/family/caregiver on 24-hour hospice availability and phone number.    Plan for next visit:  Continued EOL education and support

## 2024-06-11 ENCOUNTER — HOME CARE VISIT (OUTPATIENT)
Age: 89
End: 2024-06-11
Payer: MEDICARE

## 2024-06-11 PROCEDURE — G0299 HHS/HOSPICE OF RN EA 15 MIN: HCPCS

## 2024-06-11 PROCEDURE — G0156 HHCP-SVS OF AIDE,EA 15 MIN: HCPCS

## 2024-06-13 ENCOUNTER — HOME CARE VISIT (OUTPATIENT)
Age: 89
End: 2024-06-13
Payer: MEDICARE

## 2024-06-13 VITALS
RESPIRATION RATE: 17 BRPM | OXYGEN SATURATION: 94 % | DIASTOLIC BLOOD PRESSURE: 65 MMHG | SYSTOLIC BLOOD PRESSURE: 140 MMHG | TEMPERATURE: 97.1 F | HEART RATE: 97 BPM

## 2024-06-13 VITALS
DIASTOLIC BLOOD PRESSURE: 70 MMHG | RESPIRATION RATE: 20 BRPM | OXYGEN SATURATION: 99 % | HEART RATE: 128 BPM | TEMPERATURE: 97.5 F | SYSTOLIC BLOOD PRESSURE: 120 MMHG

## 2024-06-13 PROCEDURE — G0299 HHS/HOSPICE OF RN EA 15 MIN: HCPCS

## 2024-06-13 ASSESSMENT — ENCOUNTER SYMPTOMS
BOWEL INCONTINENCE: 1
BOWEL INCONTINENCE: 1
CONTUSION: 1
COUGH: 1

## 2024-06-13 NOTE — HOSPICE
Patient/Caregiver/Family findings/issues during SN visit:  None identified    Medication refills ordered this visit: none needed    Medications reconciled and all medications are available in the home this visit.  Education was provided regarding medications, medication interactions, and look alike medications.  Response to teaching.  PCGJennifer verbalized understanding.   Medications are effective at this time.      Supplies by type and quantity ordered this visit include: none needed    Consulted medical director/attending physician regarding: Not at this time    Instructed patient/family/caregiver on 24-hour hospice availability and phone number.    Plan for next visit:  Continued EOL education and supprt.  Patient got OOB with 2 assist to the WC.  Able to stand and pivot.  Tolerated well.

## 2024-06-13 NOTE — HOSPICE
Patients caregiver Samantha reports that when she came in for her shift this morning that patients oxygen tubing was through patients brief and that NC was under the covers instead of in her nose. Samantha also reports that patient stated she did not like the night worker, Samantha reports this was her first shift. Samantha requested that this nurse call Marleny gunter POA and tell her of findings. This nurse told Samantha that this nurse did not witness this and was not going to call. Advised Samantha to contact her agency and let them know what she found. Patient is alert and oriented x3. Vitals WNL. Oxygen via nc at 3 liters tolerated well. No edema noted. Denies pain and no signs of pain noted. Caregiver reports BM this morning. Patient ate mangos prior to nurse visit. Instructed to call hospice with any questions or concerns.      briefs, wipes, chux and gloves ordered via [employer], conf # [id number]    No meds needed

## 2024-06-14 ENCOUNTER — HOME CARE VISIT (OUTPATIENT)
Age: 89
End: 2024-06-14
Payer: MEDICARE

## 2024-06-14 PROCEDURE — G0156 HHCP-SVS OF AIDE,EA 15 MIN: HCPCS

## 2024-06-18 ENCOUNTER — HOME CARE VISIT (OUTPATIENT)
Age: 89
End: 2024-06-18
Payer: MEDICARE

## 2024-06-18 VITALS
HEART RATE: 113 BPM | SYSTOLIC BLOOD PRESSURE: 113 MMHG | DIASTOLIC BLOOD PRESSURE: 74 MMHG | TEMPERATURE: 97.7 F | OXYGEN SATURATION: 99 %

## 2024-06-18 PROCEDURE — G0299 HHS/HOSPICE OF RN EA 15 MIN: HCPCS

## 2024-06-18 PROCEDURE — G0156 HHCP-SVS OF AIDE,EA 15 MIN: HCPCS

## 2024-06-18 ASSESSMENT — ENCOUNTER SYMPTOMS: BOWEL INCONTINENCE: 1

## 2024-06-18 NOTE — HOSPICE
Patient lethargic this visit, caregiver states nighttime caregiver had to give ativan because patient was trying to get out of the bed. Current caregiver states patient has been asleep since she got here at 9am. Patient is arousable but goes back to sleep quickly. She shook her head no when asked if she was in pain, patient is tachycardic but asymptomatic. Patients bilateral heels are boggy, will order heel protectors. Patient is on 3 liters of oxygen continuously. Last BM yesterday. HHA present during visit and this nurse instructed caregiver to have patient eat after bath and then give a dose of ativan to help with tachycardia- verbalized understanding. Caregiver requesting air mattress. Instructed to call hospice with any questions or concerns      air mattress ordered via [employer], conf # [id number]    medications ordered for delivery via [employer], conf # [id number]    heel protectors and zinc cream ordered via [employer], conf # [id number]

## 2024-06-20 ENCOUNTER — HOME CARE VISIT (OUTPATIENT)
Age: 89
End: 2024-06-20
Payer: MEDICARE

## 2024-06-20 VITALS
TEMPERATURE: 97.7 F | DIASTOLIC BLOOD PRESSURE: 64 MMHG | SYSTOLIC BLOOD PRESSURE: 112 MMHG | HEART RATE: 99 BPM | OXYGEN SATURATION: 95 % | RESPIRATION RATE: 20 BRPM

## 2024-06-20 PROCEDURE — G0300 HHS/HOSPICE OF LPN EA 15 MIN: HCPCS

## 2024-06-20 NOTE — HOSPICE
Patient/Caregiver/Family/Facility findings/issues during SN visit: Pt presents sitting up in bed watching TV.  No signs of acute distress noted.  Pt denies pain at this time. No new concerns or needs per caregiver.    Medication refills ordered this visit: Not needed    Medications reconciled and all medications are available in the home this visit.  The following education was provided regarding medications, medication interactions, and look alike medications: Medication side effects, dosages, purposes, frequencies.  Response to teaching: Verbalized understanding. Medications are effective at this time.      Supplies by type and quantity ordered this visit include: Not needed    Consulted medical director/attending physician regarding: ot needed    Instructed patient/family/caregiver on 24-hour hospice availability and phone number.    Plan for next visit:  Continue end of life education and support caregiver.

## 2024-06-21 ENCOUNTER — HOME CARE VISIT (OUTPATIENT)
Age: 89
End: 2024-06-21
Payer: MEDICARE

## 2024-06-21 PROCEDURE — G0156 HHCP-SVS OF AIDE,EA 15 MIN: HCPCS

## 2024-06-25 ENCOUNTER — HOME CARE VISIT (OUTPATIENT)
Age: 89
End: 2024-06-25
Payer: MEDICARE

## 2024-06-25 VITALS
DIASTOLIC BLOOD PRESSURE: 52 MMHG | SYSTOLIC BLOOD PRESSURE: 106 MMHG | RESPIRATION RATE: 16 BRPM | HEART RATE: 79 BPM | TEMPERATURE: 97.9 F | OXYGEN SATURATION: 96 %

## 2024-06-25 PROCEDURE — G0299 HHS/HOSPICE OF RN EA 15 MIN: HCPCS

## 2024-06-25 ASSESSMENT — ENCOUNTER SYMPTOMS
CONTUSION: 1
STOOL DESCRIPTION: SOFT FORMED
HEMOPTYSIS: 0
BOWEL INCONTINENCE: 1

## 2024-06-25 NOTE — HOSPICE
Patient/Caregiver/Family/Facility findings/issues during SN visit:  Patient supine in bed with Anesha at bedside.  Patient alert and oriented to person and place.  She is pleasant.  Denies pain or shortness of breath. Oxygen on at 4 liters with PO at96%.  Eating soft foods and soups.  No change in appetite. Occasional pocketing of food reported.     Medication refills ordered this visit: no needs    Medications reconciled and all medications are available in the home this visit.  The following education was provided regarding medications, medication interactions, and look alike medications: reviewed proper indications and administration of comfort meds.   Response to teaching: Nivia verbalized understanding. Medications are effective at this time.      Supplies by type and quantity ordered this visit include: wipes, liners, medium gloves    Consulted medical director/attending physician regarding: rianna    Instructed patient/family/caregiver on 24-hour hospice availability and phone number.    Plan for next visit:  Assess comfort and needs of patient and caregiver

## 2024-06-26 ENCOUNTER — HOME CARE VISIT (OUTPATIENT)
Age: 89
End: 2024-06-26
Payer: MEDICARE

## 2024-06-26 PROCEDURE — G0156 HHCP-SVS OF AIDE,EA 15 MIN: HCPCS

## 2024-06-26 NOTE — HOSPICE
Patient was in bed with her caregiver right by her side. She wants to get up and this is a change from our last visit.  will continue to follow.

## 2024-06-27 ENCOUNTER — HOME CARE VISIT (OUTPATIENT)
Age: 89
End: 2024-06-27
Payer: MEDICARE

## 2024-06-27 VITALS
RESPIRATION RATE: 15 BRPM | OXYGEN SATURATION: 100 % | HEART RATE: 95 BPM | SYSTOLIC BLOOD PRESSURE: 115 MMHG | DIASTOLIC BLOOD PRESSURE: 53 MMHG | TEMPERATURE: 98.2 F

## 2024-06-27 PROCEDURE — G0155 HHCP-SVS OF CSW,EA 15 MIN: HCPCS

## 2024-06-27 PROCEDURE — G0299 HHS/HOSPICE OF RN EA 15 MIN: HCPCS

## 2024-06-27 ASSESSMENT — ENCOUNTER SYMPTOMS: BOWEL INCONTINENCE: 1

## 2024-06-27 NOTE — HOSPICE
Caregiver states patient has been \"sleepy\" today. Patient is lethargic during visit, easily arousable but drifts back to sleep quickly. Denies pain and no signs of pain noted. Vitals WNL. Oxygen via nc tolerated well. Lung sounds clear. Caregiver states there have been no changes since last nurse visit. Caregiver is having trouble with other caregivers that she wants this nurse to get involved. This nurse told caregiver, Safe, to communicate concerns with her company as there was nothing this nurse could do about it. Patient is safe and well taken care of. Instructed to call hospice with any questions or concerns.

## 2024-06-28 NOTE — HOSPICE
The purpose of today's visit was to provide emotional support and assess additional needs/decline of pt.  SW was welcomed into the home by paid CG.  The home is clean and tidy and there are no safety concerns noted by SW during visit.  CG states that pt is currently tired and might be asleep.  Pt is received in her bedroom, resting comfortably in her hospital bed.  Pt is wearing O2 and does not rouse to SW calling her name.  Pt is dressed appropriately and there are no visible concerns of abuse/neglect noted.  CG states not much change in pt's condition.  Cg states pt is eating well, admitting to less intake than months prior.  CG states pt still alert and oriented when pt is awake.  CG states comfort meds make her tire and sleeping has increased daily.  No other needs at this time.

## 2024-06-29 ENCOUNTER — HOME CARE VISIT (OUTPATIENT)
Age: 89
End: 2024-06-29
Payer: MEDICARE

## 2024-06-29 PROCEDURE — G0156 HHCP-SVS OF AIDE,EA 15 MIN: HCPCS

## 2024-07-02 ENCOUNTER — HOME CARE VISIT (OUTPATIENT)
Age: 89
End: 2024-07-02
Payer: MEDICARE

## 2024-07-02 VITALS
TEMPERATURE: 97.1 F | OXYGEN SATURATION: 90 % | RESPIRATION RATE: 17 BRPM | DIASTOLIC BLOOD PRESSURE: 77 MMHG | HEART RATE: 113 BPM | SYSTOLIC BLOOD PRESSURE: 106 MMHG

## 2024-07-02 PROCEDURE — G0156 HHCP-SVS OF AIDE,EA 15 MIN: HCPCS

## 2024-07-02 PROCEDURE — G0299 HHS/HOSPICE OF RN EA 15 MIN: HCPCS

## 2024-07-02 ASSESSMENT — ENCOUNTER SYMPTOMS: BOWEL INCONTINENCE: 1

## 2024-07-02 NOTE — HOSPICE
Caregiver Jennifer reports that over the past few days patient has been very agitated and acting out of character, more confused than normal. Patient is very lethargic this visit, opens eyes and smiles, denies pain and then drifts back to sleep. O2 sat 88-90% on 3lpm, no signs of distress noted. NP Mell gave order for macrobid 100mg bid x7 days for suspected UTI. Educated on med administration and side effects. Instructed to call hospice with any questions or concerns.      macrobid 100mg bid x7 days, conf # [id number]    wipes, liners, m gloves via [employer], conf # [id number]

## 2024-07-03 ENCOUNTER — HOME CARE VISIT (OUTPATIENT)
Age: 89
End: 2024-07-03
Payer: MEDICARE

## 2024-07-03 VITALS
RESPIRATION RATE: 15 BRPM | TEMPERATURE: 97.7 F | DIASTOLIC BLOOD PRESSURE: 79 MMHG | SYSTOLIC BLOOD PRESSURE: 134 MMHG | OXYGEN SATURATION: 95 % | HEART RATE: 92 BPM

## 2024-07-03 PROCEDURE — G0299 HHS/HOSPICE OF RN EA 15 MIN: HCPCS

## 2024-07-03 ASSESSMENT — ENCOUNTER SYMPTOMS: BOWEL INCONTINENCE: 1

## 2024-07-03 NOTE — HOSPICE
Patient lying in bed asleep. Caregiver reports patient had to be given ativan and morphine yesterday for combative behavior. Patient is easily arousable. Lethargic, oriented to person only. Denies pain and SOB. No signs of either. Oxygen via nc at 3l tolerated well. Lung sounds clear. Vitals WNL. Caregiver states BM this morning. Waiting for antibiotic to arrive to start treatment. There have been no changes since last nurse visit. Instructed to call hospice with any questions or concerns.      No meds or supplies needed at this time.

## 2024-07-05 ENCOUNTER — HOME CARE VISIT (OUTPATIENT)
Age: 89
End: 2024-07-05
Payer: MEDICARE

## 2024-07-05 PROCEDURE — G0156 HHCP-SVS OF AIDE,EA 15 MIN: HCPCS

## 2024-07-07 ENCOUNTER — HOME CARE VISIT (OUTPATIENT)
Age: 89
End: 2024-07-07
Payer: MEDICARE

## 2024-07-09 ENCOUNTER — HOME CARE VISIT (OUTPATIENT)
Age: 89
End: 2024-07-09
Payer: MEDICARE

## 2024-07-09 VITALS
OXYGEN SATURATION: 93 % | SYSTOLIC BLOOD PRESSURE: 128 MMHG | DIASTOLIC BLOOD PRESSURE: 97 MMHG | RESPIRATION RATE: 17 BRPM | HEART RATE: 102 BPM | TEMPERATURE: 97.1 F

## 2024-07-09 PROCEDURE — G0299 HHS/HOSPICE OF RN EA 15 MIN: HCPCS

## 2024-07-09 PROCEDURE — G0156 HHCP-SVS OF AIDE,EA 15 MIN: HCPCS

## 2024-07-09 ASSESSMENT — ENCOUNTER SYMPTOMS: BOWEL INCONTINENCE: 1

## 2024-07-09 NOTE — HOSPICE
Caregiver reports that patient does not tolerate HHA visits well because she does not like being bathed, caregiver requested decreasing HHA visits to 1xw on Tuesdays, order changed and schedule updated. HHA present during visit so is aware of changes. Caregiver was having patient drink water when she started coughing, caregiver states over the past few days she has been coughing more than usual especially at night. Lung sounds clear and o2 sat in 90s. Caregiver states patients appetite is unchanged at this time. Patient is on continuous oxygen. Patient denies pain at this time. Instructed to call hospice with any questions or concerns.      diltiazem and omeprazole ordered for delivery via [employer], conf # [id number]    chux ordered via [employer], conf # [id number]

## 2024-07-11 ENCOUNTER — HOME CARE VISIT (OUTPATIENT)
Age: 89
End: 2024-07-11
Payer: MEDICARE

## 2024-07-11 VITALS
DIASTOLIC BLOOD PRESSURE: 73 MMHG | SYSTOLIC BLOOD PRESSURE: 115 MMHG | HEART RATE: 98 BPM | OXYGEN SATURATION: 100 % | TEMPERATURE: 97.1 F

## 2024-07-11 PROCEDURE — G0300 HHS/HOSPICE OF LPN EA 15 MIN: HCPCS

## 2024-07-11 ASSESSMENT — ENCOUNTER SYMPTOMS: BOWEL INCONTINENCE: 1

## 2024-07-11 NOTE — HOSPICE
Patient/Caregiver/Family/Facility findings/issues during SN visit:  Pt presents lying in bed.  No signs of distress.  Pt denies pain.  Vitals WNL.  No new concerns per caregiver.    Medication refills ordered this visit: Meds being delivered today    Medications reconciled and all medications are available in the home this visit.  The following education was provided regarding medications, medication interactions, and look alike medications: Medication side effects, dosages, purposes, frequencies.  Response to teaching: Verbalized understanding. Medications are effective at this time.      Supplies by type and quantity ordered this visit include:Not needed    Consulted medical director/attending physician regarding: Not needed    Instructed patient/family/caregiver on 24-hour hospice availability and phone number.    Plan for next visit:  Continue end of life education and support caregiver.

## 2024-07-13 ENCOUNTER — HOME CARE VISIT (OUTPATIENT)
Age: 89
End: 2024-07-13
Payer: MEDICARE

## 2024-07-15 ENCOUNTER — HOME CARE VISIT (OUTPATIENT)
Age: 89
End: 2024-07-15
Payer: MEDICARE

## 2024-07-15 NOTE — HOSPICE
Patient was asleep but the caregiver shared that the patient sleeps more and does not get out of bed.  will continue to follow.

## 2024-07-16 ENCOUNTER — HOME CARE VISIT (OUTPATIENT)
Age: 89
End: 2024-07-16
Payer: MEDICARE

## 2024-07-16 VITALS
DIASTOLIC BLOOD PRESSURE: 66 MMHG | OXYGEN SATURATION: 96 % | SYSTOLIC BLOOD PRESSURE: 104 MMHG | TEMPERATURE: 98 F | HEART RATE: 120 BPM

## 2024-07-16 PROCEDURE — G0156 HHCP-SVS OF AIDE,EA 15 MIN: HCPCS

## 2024-07-16 PROCEDURE — G0299 HHS/HOSPICE OF RN EA 15 MIN: HCPCS

## 2024-07-16 ASSESSMENT — ENCOUNTER SYMPTOMS: BOWEL INCONTINENCE: 1

## 2024-07-16 NOTE — HOSPICE
Patient lying in bed fiddling with blanket and had oxygen tubing in her hands instead of in her nose. Patient was easily redirected and oxygen was placed. Patient is alert, oriented only to person and caregiver. Patient denies pain and no signs of pain noted. Vitals WNL. Caregiver reports no changes since last nurse visit. Instructed to call hospice with any questions or concerns.      No meds or supplies needed at this time

## 2024-07-18 ENCOUNTER — HOME CARE VISIT (OUTPATIENT)
Age: 89
End: 2024-07-18
Payer: MEDICARE

## 2024-07-18 VITALS
DIASTOLIC BLOOD PRESSURE: 71 MMHG | TEMPERATURE: 97.5 F | SYSTOLIC BLOOD PRESSURE: 103 MMHG | RESPIRATION RATE: 15 BRPM | HEART RATE: 93 BPM | OXYGEN SATURATION: 96 %

## 2024-07-18 PROCEDURE — G0299 HHS/HOSPICE OF RN EA 15 MIN: HCPCS

## 2024-07-18 ASSESSMENT — ENCOUNTER SYMPTOMS: BOWEL INCONTINENCE: 1

## 2024-07-18 NOTE — HOSPICE
Patient asleep in bed upon arrival. She is easily arousable. Oriented x1. Denies pain. Vitals WNL. Caregiver reports patient has been very agitated lately, trying to get out of bed to go to the bathroom, taking off oxygen and just being extremely fidgity. Instructed caregivers to give lorazepam when she has these behaviors to keep her safe, although they don't want to oversedate her. Again reiterated that safety comes first. Caregiver states that patient ate 2 eggs for breakfast. Patient has a fragile area of skin to her mid back, will order foam dressing at next visit when supplies can be ordered. Reminded to frequently turn and reposition patient to prevent skin breakdown. Instructed to call hospice with any questions or concerns.      No meds or supplies needed at this time

## 2024-07-23 ENCOUNTER — HOME CARE VISIT (OUTPATIENT)
Age: 89
End: 2024-07-23
Payer: MEDICARE

## 2024-07-23 VITALS — HEART RATE: 89 BPM | RESPIRATION RATE: 14 BRPM | OXYGEN SATURATION: 94 %

## 2024-07-23 PROCEDURE — G0299 HHS/HOSPICE OF RN EA 15 MIN: HCPCS

## 2024-07-23 NOTE — HOSPICE
Pt. asleep during visit. Caregiver reports her appetite is adequate and eats fruits, soups, and ensure daily. One choking episode on soup. Refilled diltazem and omeprazole. Appeared comfortable during visit. No supplies needed at this time. Encouraged to use 24/7 nurse line for questions/concerns.

## 2024-07-24 ENCOUNTER — HOME CARE VISIT (OUTPATIENT)
Age: 89
End: 2024-07-24
Payer: MEDICARE

## 2024-07-24 PROCEDURE — G0156 HHCP-SVS OF AIDE,EA 15 MIN: HCPCS

## 2024-07-25 ENCOUNTER — HOME CARE VISIT (OUTPATIENT)
Age: 89
End: 2024-07-25
Payer: MEDICARE

## 2024-07-25 VITALS
OXYGEN SATURATION: 94 % | SYSTOLIC BLOOD PRESSURE: 134 MMHG | TEMPERATURE: 97.1 F | HEART RATE: 104 BPM | RESPIRATION RATE: 17 BRPM | DIASTOLIC BLOOD PRESSURE: 72 MMHG

## 2024-07-25 PROCEDURE — G0299 HHS/HOSPICE OF RN EA 15 MIN: HCPCS

## 2024-07-25 ASSESSMENT — ENCOUNTER SYMPTOMS: CONTUSION: 1

## 2024-07-25 NOTE — HOSPICE
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 Co-morbidities related to the terminal diagnosis are debility, malnutrition, CKD, A-Fib, CAD, and history of subdural hematoma. Patient also has a past medical history of GERD and hyperlipidemia.  Initial assessment onto hospice services. Do not read this section in [facility]. This is a look back for MD & NP to read and gather further info on pt, and to use in the recertification documentation.   Clinical Summary (including changes since hospice admission and/or re-certification):               On Admission: Patient was living in her home with 24-hour paid caregivers. Per the caregiver, patient was able to ambulate independently (sometimes using a walker), she was continent and able to ambulate to the bathroom alone. She was able to feed herself. She did require maximum assistance with showering/bathing, cooking, cleaning and laundry. She has a trust agent that has been overseeing her finances and bill payment. She was alert and oriented to person, place, and situation. Per caregiver, the patient became very short of breath several days ago and that is why she was hospitalized. She was found to have A-fib causing her weakness and shortness of breath.     Six months ago: (if needed)      Now:   Patient is oriented to self and caregivers but thinks her house is a hospital. Patient is now bedbound. She now uses oxygen continuously where as last benefit period she was just using PRN. Patient has to have assistance

## 2024-07-29 ENCOUNTER — HOME CARE VISIT (OUTPATIENT)
Age: 89
End: 2024-07-29
Payer: MEDICARE

## 2024-07-30 ENCOUNTER — HOME CARE VISIT (OUTPATIENT)
Age: 89
End: 2024-07-30
Payer: MEDICARE

## 2024-07-30 VITALS
TEMPERATURE: 97.8 F | SYSTOLIC BLOOD PRESSURE: 105 MMHG | RESPIRATION RATE: 15 BRPM | DIASTOLIC BLOOD PRESSURE: 74 MMHG | OXYGEN SATURATION: 95 % | HEART RATE: 129 BPM

## 2024-07-30 PROCEDURE — G0299 HHS/HOSPICE OF RN EA 15 MIN: HCPCS

## 2024-07-30 ASSESSMENT — ENCOUNTER SYMPTOMS
BOWEL INCONTINENCE: 1
CONTUSION: 1

## 2024-07-30 NOTE — HOSPICE
Patient lying in bed. She is alert, confused. Only oriented to self and caregiver. She denies pain and no signs of pain noted. Patient is tacycardic but all other vitals WNL. Reached out to NP Mell to make aware and request new orders. Patients skin is intact. Generalized brusing to BUE. Oxygen via nc at 4 liters tolerated well. Patient has been more agitated the past few days, some caregivers give ativan, some dont. Education provided on staying consistent. Mell ordered increase in ditalizem to 180mg daily. Profiled with [employer] and set up for delivery tomorrow.    Diltiazem conf # [id number]    Gloves, wipes, zinc and barrier ointment ordered via [employer], conf # [id number]

## 2024-07-31 ENCOUNTER — HOME CARE VISIT (OUTPATIENT)
Age: 89
End: 2024-07-31
Payer: MEDICARE

## 2024-07-31 PROCEDURE — G0156 HHCP-SVS OF AIDE,EA 15 MIN: HCPCS

## 2024-07-31 NOTE — HOSPICE
SW made 2nd attempt to schedule monthly SW visit.  A detailed message was left with no return call from CG.  SW places a missed visit note and will attempt contact again.

## 2024-08-01 ENCOUNTER — HOME CARE VISIT (OUTPATIENT)
Age: 89
End: 2024-08-01
Payer: MEDICARE

## 2024-08-01 PROCEDURE — G0299 HHS/HOSPICE OF RN EA 15 MIN: HCPCS

## 2024-08-01 ASSESSMENT — ENCOUNTER SYMPTOMS
BOWEL INCONTINENCE: 1
CONTUSION: 1

## 2024-08-01 NOTE — HOSPICE
Caregiver Samantha reports patient was up most of the night and is now sleeping and did not want this nurse to assess patient. Assessment questions were answered by caregiver. She reports patient is having to have more ativan at night time. Reports appetite is staying the same and has adequate fluid intake. Reports BM Tuesday. New medication to arrive today. Instructed to call hospice with any questions or concerns. Supplies ordered tuesday should be delivered either today or tomorrow.

## 2024-08-05 ENCOUNTER — HOME CARE VISIT (OUTPATIENT)
Age: 89
End: 2024-08-05
Payer: MEDICARE

## 2024-08-05 VITALS
HEART RATE: 103 BPM | OXYGEN SATURATION: 100 % | DIASTOLIC BLOOD PRESSURE: 70 MMHG | RESPIRATION RATE: 17 BRPM | TEMPERATURE: 98.2 F | SYSTOLIC BLOOD PRESSURE: 107 MMHG

## 2024-08-05 PROCEDURE — G0299 HHS/HOSPICE OF RN EA 15 MIN: HCPCS

## 2024-08-05 ASSESSMENT — ENCOUNTER SYMPTOMS
BOWEL INCONTINENCE: 1
CONTUSION: 1

## 2024-08-05 NOTE — HOSPICE
Caregiver Samantha reports last night patient was very combative, trying to hit her and pushing back when she was trying to change her, was howling and very agitated. Samantha reports she gave morphine and ativan and that calmed patient down and she went to sleep. TOday patient is drowsy, opens eyes, acknowledges this nurse and then closes her eyes again. There are no signs of pain noted. Vitals WNL. Patient continues to weat o2 continuously. Samantha reports patient ate soem eggs this morning. Reports patient has an intermittent cough and requesting refill of medication. Instructed to call hospice with any questions or concern.      Cough medication to be delivered tomorrow via [employer], conf # [id number]    No supplies needed at this time

## 2024-08-07 ENCOUNTER — HOME CARE VISIT (OUTPATIENT)
Age: 89
End: 2024-08-07
Payer: MEDICARE

## 2024-08-07 VITALS
DIASTOLIC BLOOD PRESSURE: 80 MMHG | RESPIRATION RATE: 22 BRPM | SYSTOLIC BLOOD PRESSURE: 119 MMHG | OXYGEN SATURATION: 91 % | TEMPERATURE: 98.4 F | HEART RATE: 106 BPM

## 2024-08-07 PROCEDURE — G0299 HHS/HOSPICE OF RN EA 15 MIN: HCPCS

## 2024-08-07 ASSESSMENT — ENCOUNTER SYMPTOMS: BOWEL INCONTINENCE: 1

## 2024-08-08 ENCOUNTER — HOME CARE VISIT (OUTPATIENT)
Age: 89
End: 2024-08-08
Payer: MEDICARE

## 2024-08-08 VITALS
OXYGEN SATURATION: 90 % | DIASTOLIC BLOOD PRESSURE: 89 MMHG | HEART RATE: 110 BPM | RESPIRATION RATE: 20 BRPM | SYSTOLIC BLOOD PRESSURE: 125 MMHG | TEMPERATURE: 99.5 F

## 2024-08-08 PROCEDURE — G0299 HHS/HOSPICE OF RN EA 15 MIN: HCPCS

## 2024-08-08 ASSESSMENT — ENCOUNTER SYMPTOMS: BOWEL INCONTINENCE: 1

## 2024-08-08 NOTE — HOSPICE
Patient is imminent    Patient has been NPO x1 day. She is receiving oral care a few times per day. Patient has been mostly unresponsive since yesterdays visits per caregiver, during visit patient opened her eyes and then said thank you at end of visit. Her vitals are WNL. Lung sounds clear. Patients bilateral feet are cool to touch. Educated caregiver on EOL and what can be expected and how to manage symptoms. Instructed to call hospice with any questions or concerns and at time of death.

## 2024-08-08 NOTE — HOSPICE
Upon arrival patient was coughing non stop. Jennifer the caregiver reports she ate an egg and had an ensure for breakfast and was fine but when she gave her water she started coughing and couldnt stop. Patient coughed on and off for the whole 1.5 hour visit. Patient was sat up all the way and turned on her side. Spoke to ERIC Leonard who ordered duonebs. She did not want to do an antibiotic because caregiver stated that the past few days patient has been chewing her meds and not swallowing them whole. NP said there is nothing to do about the coughing except trying to sooth her throat and maybe some cough medication but the chances of her aspirating on that are great. Patient is now on aspiration precautions. She will be on nectar thickened liquids, the owner of the [employer] company went out and purchased thick it to use until the supplies I ordered arrived. Instructed to no longer use a straw but to use a spoon if needed.     Caregiver states that yesterday patient was talking to her  boyfriend and was saying it was time for her to \"go home\". Today the caregiver states that the patient did not greet her at all like she usually does. She did not speak to jennifer this morning. When being fed breakfast jennifer said she had a glazed over look on her face and was staring up at the ceiling.     EOL education provided to caregiver.    Patient's new trust fund agent, Geneva Hernandez [phone], was contacted and made aware that patient was imminent and does not have much time left and we need to know a  home. Geneva stated the funer home would be [facility] [phone], and that it is documented that the patients wishes are to donate her body to science. [facility].      Duo neb, omperazole and ativan ordered for delivery conf #[id number]    Liners and thick it ordered from [employer], conf # [id number]    Instructed to call hospice with any questions or concerns and at time of

## 2024-08-09 ENCOUNTER — HOME CARE VISIT (OUTPATIENT)
Age: 89
End: 2024-08-09
Payer: MEDICARE

## 2024-08-09 VITALS
DIASTOLIC BLOOD PRESSURE: 93 MMHG | RESPIRATION RATE: 23 BRPM | OXYGEN SATURATION: 89 % | TEMPERATURE: 97.3 F | SYSTOLIC BLOOD PRESSURE: 138 MMHG | HEART RATE: 122 BPM

## 2024-08-09 PROCEDURE — G0299 HHS/HOSPICE OF RN EA 15 MIN: HCPCS

## 2024-08-09 ASSESSMENT — ENCOUNTER SYMPTOMS: BOWEL INCONTINENCE: 1

## 2024-08-09 NOTE — HOSPICE
Patient is a little more alert than yesterday, she was able to smile and stated that she was \"doing ok\". O2 sat was running between 89-91% on 5 liters. Caregiver reports patient has been NPO x3 days. They are providing oral care. Patients bilateral feet are cool to touch. Continued to educate caregiver on EOL and what can be expected and how to manage symptoms. Instructed to call hospice with any questions or concerns and at time of death.      Patient continues to be immient and will continue with daily visits.

## 2024-08-10 ENCOUNTER — HOME CARE VISIT (OUTPATIENT)
Age: 89
End: 2024-08-10
Payer: MEDICARE

## 2024-08-10 VITALS
DIASTOLIC BLOOD PRESSURE: 86 MMHG | SYSTOLIC BLOOD PRESSURE: 128 MMHG | OXYGEN SATURATION: 99 % | TEMPERATURE: 97.9 F | RESPIRATION RATE: 16 BRPM | HEART RATE: 110 BPM

## 2024-08-10 PROCEDURE — G0300 HHS/HOSPICE OF LPN EA 15 MIN: HCPCS

## 2024-08-10 ASSESSMENT — ENCOUNTER SYMPTOMS: BOWEL INCONTINENCE: 1

## 2024-08-10 NOTE — HOSPICE
Patient/Caregiver/Family/Facility findings/issues during SN visit:  [place] PCA states there are no changes.  Pt is lying inbed with eyes closed.  Pt is easily roused but promptly falls back asleep.  Vitals WNL but pulse is slightly elevated.  Symptoms controlled well with comfort meds at this time.  Daily visits to continue for symptom management and EOL education.    Medication refills ordered this visit: Not needed    Medications reconciled and all medications are available in the home this visit.  The following education was provided regarding medications, medication interactions, and look alike medications: Medication side effects, dosages, purposes, frequencies.  Response to teaching: Verbalized understanding. Medications are effective at this time.      Supplies by type and quantity ordered this visit include: Not needed    Consulted medical director/attending physician regarding: Not needed    Instructed patient/family/caregiver on 24-hour hospice availability and phone number.    Plan for next visit:  Continue end of life education and support caregiver.

## 2024-08-12 NOTE — HOSPICE
Patient/Caregiver/Family/Facility findings/issues during SN visit: Pt presents lying in bed with eyes closed.  NO signs of acute distress noted.  VItals WNL.  Pt will continue on daily visits to maintain symptoms and suport caregivers.      Medication refills ordered this visit:wipes, med gloves----Order Number : [id number]    Medications reconciled and all medications are available in the home this visit.  The following education was provided regarding medications, medication interactions, and look alike medications: Medication side effects, dosages, purposes, frequencies.  Response to teaching: Verbalized understanding. Medications are effective at this time.      Supplies by type and quantity ordered this visit include: Not needed    Consulted medical director/attending physician regarding: Not needed    Instructed patient/family/caregiver on 24-hour hospice availability and phone number.    Plan for next visit:  Continue end of life education and support caregiver.

## 2024-08-13 NOTE — HOSPICE
Patient/Caregiver/Family/Facility findings/issues during SN visit:  Pt is less responsive today.  Had some difficulty breathing and was wheezing according to caregiver. Duo-rhonda treatment administered and patient appeared to be more comfortable.  Nothing to eat today but did have BM.  Daily visits to continue for symptom management and caregiver support.    Medication refills ordered this visit: Not needed    Medications reconciled and all medications are available in the home this visit.  The following education was provided regarding medications, medication interactions, and look alike medications: Medication side effects, dosages, purposes, frequencies.  Response to teaching: Verbalized understanding. Medications are effective at this time.      Supplies by type and quantity ordered this visit include: Not needed    Consulted medical director/attending physician regarding: Not needed    Instructed patient/family/caregiver on 24-hour hospice availability and phone number.    Plan for next visit:  Continue end of life education and support caregiver.

## 2024-08-13 NOTE — HOSPICE
Patient/Caregiver/Family/Facility findings/issues during SN visit:  Pt presents sitting up in bed watching TV.  No signs of acute distress noted.  Pt appetite has been decreased but had a BM yesterday    Medication refills ordered this visit: Not needed    Medications reconciled and all medications are available in the home this visit.  The following education was provided regarding medications, medication interactions, and look alike medications: Medication side effects, dosages, purposes, frequencies.  Response to teaching: Verbalized understanding. Medications are effective at this time.      Supplies by type and quantity ordered this visit include: Not needed    Consulted medical director/attending physician regarding: Not needed    Instructed patient/family/caregiver on 24-hour hospice availability and phone number.    Plan for next visit:  Continue end of life education and support caregiver.

## 2024-08-14 NOTE — HOSPICE
Caregiver reports that patient has been eating a drinking a little, ensure and water with thickner in it. And baby food. Caregiver stated they have been giving patient medication in baby food. Advised to stop medications as she was only taking prilosec and diltiazem and patient is hypotensive today so it was not indicated anymore. Patient opened eyes during visit but did not talk. Patient with increased respirations, morphine was given. Patient has o2 on continuously and o2 sats in 60s and 70s this visit. Patient right laterl foot is cool to touch. Patient has a low grade fever. Continued to educate caregiver on EOL and what can be expected and how to manage symptoms. Caregiver requesting morphine be reordered, she stated she told the nurse yesterday and that nurse said she was going to order it. In [facility] there is no pending order.      Morphine filled at local pharmacy conf #[id number]    gloves ordered via [facility] ,conf # [id number]

## 2024-08-15 NOTE — HOSPICE
The purpose of today's SW visit is to provide emotional support to pt/cg due to pt's recent decline.  SW was welcomed into the home by paid CG, Yaneli.  Home is clean and no safety concerns are noted by SW during visit.  Pt is received in her room, lying awake in her hospital bed. Pt is dressed appropriately and there are no visible signs of abuse/neglect. Pt is A & O to self only.  Pt is able to make eye to eye contact with SW when SW speaks her name.  Pt is mostly non-verbal.  CG reports pt tries to speak, but the level is very faint.  Pt is wearing continuous O2 and pt is experiencing some shallow, labored breaths.  CG reports pt decline was very sudden and she was shocked by her condition when returning today after a 5 day break.  CG states pt is still taking bites and sips.  CG is comfortable with providing comfort meds and states RN CM just reinforced their uses today.  CG states she is beginning to hear the rattled breathing in pt and understands this is a common EOL symptom.  CG states dome disbelieve and sadness, as she has been caring for pt for over 3 years.  SW provides emotional support and encouragement/praise for the wonderful caregiving the caregiver has provided to patient.

## 2024-08-15 NOTE — HOSPICE
Patient remains imminent at this time. Patient is unresponsive to painful stimuli. Patient is on 5 liters of oxygen and o2 sat at 66%, increased respiratory rate but does not appear in any distress. Patients extremities are cool to touch. Patients right toes and some of is mottled. Caregiver reports patient has not produced any urine in 24 hours. Reviewed eol symptoms and how to manage them, what can be expected and what to do at time of death. Reviewed comfort medications. Instructed to call hospice with any questions or concerns and at time of death

## 2024-08-16 NOTE — HOSPICE
Patient is minimally responsive, will open right eye half way for a few seconds. Caregiver states that overnight patient asked for water, so patient was given water via a spoon. Patient is open mouth breathing during visit. Mottling to bilateral feet and left hand. Unable to obtain bp or o2 sat. Continued to provide eol education to caregiver, instructed not to give patient anything by mouth as she is not responsive and she can choke on it, instructed to use mouth swabs instead. Instructed to call hospice with any questions or concerns and at time of death.      No meds or supplies needed at this time

## 2024-08-23 VITALS
SYSTOLIC BLOOD PRESSURE: 167 MMHG | TEMPERATURE: 98.9 F | DIASTOLIC BLOOD PRESSURE: 121 MMHG | RESPIRATION RATE: 33 BRPM | HEART RATE: 89 BPM

## 2024-08-23 ASSESSMENT — ENCOUNTER SYMPTOMS
PAIN LOCATION - PAIN QUALITY: ACHING
TROUBLE SWALLOWING: 1
BOWEL INCONTINENCE: 1

## 2024-08-23 NOTE — HOSPICE
Patient/Caregiver/Family/Facility findings/issues during SN visit:  Decreased appetite, tachypnea    Medication refills ordered this visit: none    Medications reconciled and all medications are available in the home this visit. Medications are effective at this time.      Supplies by type and quantity ordered this visit include: none    Instructed patient/family/caregiver on 24-hour hospice availability and phone number.    Plan for next visit:  EOL education and caregiver support